# Patient Record
Sex: MALE | Race: WHITE | Employment: PART TIME | URBAN - METROPOLITAN AREA
[De-identification: names, ages, dates, MRNs, and addresses within clinical notes are randomized per-mention and may not be internally consistent; named-entity substitution may affect disease eponyms.]

---

## 2017-03-24 DIAGNOSIS — M10.9 GOUT, UNSPECIFIED CAUSE, UNSPECIFIED CHRONICITY, UNSPECIFIED SITE: ICD-10-CM

## 2017-03-24 DIAGNOSIS — E78.5 HYPERLIPIDEMIA LDL GOAL <100: ICD-10-CM

## 2017-03-24 DIAGNOSIS — I10 HYPERTENSION, GOAL BELOW 140/90: ICD-10-CM

## 2017-03-24 DIAGNOSIS — I10 ESSENTIAL HYPERTENSION, BENIGN: ICD-10-CM

## 2017-03-24 NOTE — TELEPHONE ENCOUNTER
Amlodipine and losartan      Last Written Prescription Date: 12/28/2016  Last Fill Quantity: 90, # refills: 0  Last Office Visit with [facility] or [facility] prescribing provider: 12/15/2015       Potassium   Date Value Ref Range Status   12/15/2015 3.9 3.4 - 5.3 mmol/L Final     Creatinine   Date Value Ref Range Status   12/15/2015 1.36 (H) 0.66 - 1.25 mg/dL Final     BP Readings from Last 3 Encounters:   12/15/15 136/47   11/13/14 137/55   06/11/14 140/62       Simvastatin     Last Written Prescription Date: 12/28/2016  Last Fill Quantity: 90, # refills: 0  Last Office Visit with [facility] or [facility] prescribing provider: 12/15/2015       Lab Results   Component Value Date    CHOL 140 11/13/2014     Lab Results   Component Value Date     11/13/2014     Lab Results   Component Value Date    LDL 26 11/13/2014     Lab Results   Component Value Date    TRIG 61 11/13/2014     Lab Results   Component Value Date    CHOLHDLRATIO 1.4 11/13/2014       Allopurinal       Last Written Prescription Date: 12/28/2016  Last Fill Quantity: 90, # refills: 0  Last Office Visit with [facility] or [facility] prescribing provider:  12/15/2015        Uric Acid   Date Value Ref Range Status   07/29/2004 5.7 3.5 - 8.5 mg/dL Final   ]  Creatinine   Date Value Ref Range Status   12/15/2015 1.36 (H) 0.66 - 1.25 mg/dL Final   ]  Lab Results   Component Value Date    WBC 7.0 07/27/2012     Lab Results   Component Value Date    RBC 3.98 07/27/2012     Lab Results   Component Value Date    HGB 13.4 07/27/2012     Lab Results   Component Value Date    HCT 38.3 07/27/2012     No components found for: MCT  Lab Results   Component Value Date    MCV 96 07/27/2012     Lab Results   Component Value Date    MCH 33.7 07/27/2012     Lab Results   Component Value Date    MCHC 35.0 07/27/2012     Lab Results   Component Value Date    RDW 11.5 07/27/2012     Lab Results   Component Value Date     07/27/2012     Lab Results   Component Value Date    AST  29 07/27/2012     Lab Results   Component Value Date    ALT 10 07/27/2012       Don FlorenceR)

## 2017-03-28 RX ORDER — LOSARTAN POTASSIUM 100 MG/1
100 TABLET ORAL DAILY
Qty: 90 TABLET | Refills: 0 | Status: SHIPPED | OUTPATIENT
Start: 2017-03-28 | End: 2017-03-29

## 2017-03-28 RX ORDER — AMLODIPINE BESYLATE 10 MG/1
10 TABLET ORAL DAILY
Qty: 90 TABLET | Refills: 0 | Status: SHIPPED | OUTPATIENT
Start: 2017-03-28 | End: 2017-03-29

## 2017-03-28 RX ORDER — SIMVASTATIN 20 MG
20 TABLET ORAL AT BEDTIME
Qty: 90 TABLET | Refills: 0 | Status: SHIPPED | OUTPATIENT
Start: 2017-03-28 | End: 2017-03-29

## 2017-03-28 RX ORDER — ALLOPURINOL 100 MG/1
100 TABLET ORAL DAILY
Qty: 90 TABLET | Refills: 0 | Status: SHIPPED | OUTPATIENT
Start: 2017-03-28 | End: 2017-03-29

## 2017-03-28 NOTE — TELEPHONE ENCOUNTER
Routing refill request to provider for review/approval because:  Labs out of range:  See below    Magy Gonzales RN

## 2017-03-29 ENCOUNTER — OFFICE VISIT (OUTPATIENT)
Dept: FAMILY MEDICINE | Facility: CLINIC | Age: 82
End: 2017-03-29
Payer: COMMERCIAL

## 2017-03-29 VITALS
SYSTOLIC BLOOD PRESSURE: 148 MMHG | HEIGHT: 70 IN | OXYGEN SATURATION: 97 % | BODY MASS INDEX: 23.71 KG/M2 | HEART RATE: 66 BPM | WEIGHT: 165.6 LBS | DIASTOLIC BLOOD PRESSURE: 55 MMHG

## 2017-03-29 DIAGNOSIS — Z23 ENCOUNTER FOR IMMUNIZATION: ICD-10-CM

## 2017-03-29 DIAGNOSIS — I10 HYPERTENSION GOAL BP (BLOOD PRESSURE) < 150/90: ICD-10-CM

## 2017-03-29 DIAGNOSIS — I10 HYPERTENSION, GOAL BELOW 140/90: ICD-10-CM

## 2017-03-29 DIAGNOSIS — E78.5 HYPERLIPIDEMIA LDL GOAL <100: Primary | ICD-10-CM

## 2017-03-29 DIAGNOSIS — I10 ESSENTIAL HYPERTENSION, BENIGN: ICD-10-CM

## 2017-03-29 DIAGNOSIS — M10.9 GOUT, UNSPECIFIED CAUSE, UNSPECIFIED CHRONICITY, UNSPECIFIED SITE: ICD-10-CM

## 2017-03-29 LAB
ANION GAP SERPL CALCULATED.3IONS-SCNC: 7 MMOL/L (ref 3–14)
BUN SERPL-MCNC: 23 MG/DL (ref 7–30)
CALCIUM SERPL-MCNC: 9.4 MG/DL (ref 8.5–10.1)
CHLORIDE SERPL-SCNC: 107 MMOL/L (ref 94–109)
CHOLEST SERPL-MCNC: 163 MG/DL
CO2 SERPL-SCNC: 26 MMOL/L (ref 20–32)
CREAT SERPL-MCNC: 1.36 MG/DL (ref 0.66–1.25)
GFR SERPL CREATININE-BSD FRML MDRD: 50 ML/MIN/1.7M2
GLUCOSE SERPL-MCNC: 102 MG/DL (ref 70–99)
HDLC SERPL-MCNC: 96 MG/DL
LDLC SERPL CALC-MCNC: 54 MG/DL
NONHDLC SERPL-MCNC: 67 MG/DL
POTASSIUM SERPL-SCNC: 4 MMOL/L (ref 3.4–5.3)
SODIUM SERPL-SCNC: 140 MMOL/L (ref 133–144)
TRIGL SERPL-MCNC: 66 MG/DL

## 2017-03-29 PROCEDURE — 80061 LIPID PANEL: CPT | Performed by: FAMILY MEDICINE

## 2017-03-29 PROCEDURE — 36415 COLL VENOUS BLD VENIPUNCTURE: CPT | Performed by: FAMILY MEDICINE

## 2017-03-29 PROCEDURE — 90670 PCV13 VACCINE IM: CPT | Performed by: FAMILY MEDICINE

## 2017-03-29 PROCEDURE — G0009 ADMIN PNEUMOCOCCAL VACCINE: HCPCS | Performed by: FAMILY MEDICINE

## 2017-03-29 PROCEDURE — 80048 BASIC METABOLIC PNL TOTAL CA: CPT | Performed by: FAMILY MEDICINE

## 2017-03-29 PROCEDURE — 99397 PER PM REEVAL EST PAT 65+ YR: CPT | Mod: 25 | Performed by: FAMILY MEDICINE

## 2017-03-29 RX ORDER — LOSARTAN POTASSIUM 100 MG/1
100 TABLET ORAL DAILY
Qty: 90 TABLET | Refills: 3 | Status: SHIPPED | OUTPATIENT
Start: 2017-03-29 | End: 2018-03-20

## 2017-03-29 RX ORDER — ANTIOX #8/OM3/DHA/EPA/LUT/ZEAX 250-2.5 MG
1 CAPSULE ORAL 2 TIMES DAILY
COMMUNITY
End: 2019-01-01

## 2017-03-29 RX ORDER — ALLOPURINOL 100 MG/1
100 TABLET ORAL DAILY
Qty: 90 TABLET | Refills: 3 | Status: SHIPPED | OUTPATIENT
Start: 2017-03-29 | End: 2018-03-20

## 2017-03-29 RX ORDER — SIMVASTATIN 20 MG
20 TABLET ORAL AT BEDTIME
Qty: 90 TABLET | Refills: 3 | Status: SHIPPED | OUTPATIENT
Start: 2017-03-29 | End: 2018-03-20

## 2017-03-29 RX ORDER — AMLODIPINE BESYLATE 10 MG/1
10 TABLET ORAL DAILY
Qty: 90 TABLET | Refills: 3 | Status: SHIPPED | OUTPATIENT
Start: 2017-03-29 | End: 2018-03-20

## 2017-03-29 NOTE — LETTER
[facility]  [address]  [facility] [address]  Phone: [phone]    March 30, 2017    Andres Sow  [address] [place] [address]          Dear Andres,    The results of your recent lab tests were within normal limits. Enclosed is a copy of these results.  If you have any further questions or problems, please contact our office.  Results for orders placed or performed in visit on 03/29/17   Basic metabolic panel   Result Value Ref Range    Sodium 140 133 - 144 mmol/L    Potassium 4.0 3.4 - 5.3 mmol/L    Chloride 107 94 - 109 mmol/L    Carbon Dioxide 26 20 - 32 mmol/L    Anion Gap 7 3 - 14 mmol/L    Glucose 102 (H) 70 - 99 mg/dL    Urea Nitrogen 23 7 - 30 mg/dL    Creatinine 1.36 (H) 0.66 - 1.25 mg/dL    GFR Estimate 50 (L) >60 mL/min/1.7m2    GFR Estimate If Black 61 >60 mL/min/1.7m2    Calcium 9.4 8.5 - 10.1 mg/dL   Lipid panel reflex to direct LDL   Result Value Ref Range    Cholesterol 163 <200 mg/dL    Triglycerides 66 <150 mg/dL    HDL Cholesterol 96 >39 mg/dL    LDL Cholesterol Calculated 54 <100 mg/dL    Non HDL Cholesterol 67 <130 mg/dL     Sincerely,      Emery Shanks MD/ llc

## 2017-03-29 NOTE — PATIENT INSTRUCTIONS
Thank you for choosing [facility].  You may be receiving a survey in the mail from Stephanie Graham regarding your visit today.  Please take a few minutes to complete and return the survey to let us know how we are doing.      Our Clinic hours are:  Mondays    7:20 am - 7 pm  Tues -  Fri  7:20 am - 5 pm    Clinic Phone: [phone]    The clinic lab opens at 7:30 am Mon - Fri and appointments are required.    [facility]. [phone]  Monday-Thursday 8 am - 7pm  Tues/Wed/Fri 8 am - 5:30 pm         Preventive Health Recommendations:   Male Ages 65 and over    Yearly exam:             See your health care provider every year in order to  o   Review health changes.   o   Discuss preventive care.    o   Review your medicines if your doctor has prescribed any.    Talk with your health care provider about whether you should have a test to screen for prostate cancer (PSA).    Every 3 years, have a diabetes test (fasting glucose). If you are at risk for diabetes, you should have this test more often.    Every 5 years, have a cholesterol test. Have this test more often if you are at risk for high cholesterol or heart disease.     Every 10 years, have a colonoscopy. Or, have a yearly FIT test (stool test). These exams will check for colon cancer.    Talk to with your health care provider about screening for Abdominal Aortic Aneurysm if you have a family history of AAA or have a history of smoking.    Shots:     Get a flu shot each year.     Get a tetanus shot every 10 years.     Talk to your doctor about your pneumonia vaccines. There are now two you should receive - Pneumovax (PPSV 23) and Prevnar (PCV 13).     Talk to your doctor about a shingles vaccine.     Talk to your doctor about the hepatitis B vaccine.  Nutrition:     Eat at least 5 servings of fruits and vegetables each day.     Eat whole-grain bread, whole-wheat pasta and brown rice instead of white grains and rice.     Talk to your  provider about Calcium and Vitamin D.   Lifestyle    Exercise for at least 150 minutes a week (30 minutes a day, 5 days a week). This will help you control your weight and prevent disease.     Limit alcohol to one drink per day.     No smoking.     Wear sunscreen to prevent skin cancer.     See your dentist every six months for an exam and cleaning.     See your eye doctor every 1 to 2 years to screen for conditions such as glaucoma, macular degeneration, cataracts, etc

## 2017-03-29 NOTE — MR AVS SNAPSHOT
After Visit Summary   3/29/2017    Andres Sow    MRN: [id number]           Patient Information     Date Of Birth          11/21/1933        Visit Information        Provider Department      3/29/2017 9:00 AM Emery Shanks MD [facility]        Today's Diagnoses     Hyperlipidemia LDL goal <100    -  1    Hypertension goal BP (blood pressure) < 150/90        Essential hypertension, benign        Hypertension, goal is average of 140/90 per cardiology. Labile hypertension.        Gout, unspecified cause, unspecified chronicity, unspecified site          Care Instructions          Thank you for choosing [facility].  You may be receiving a survey in the mail from [email or web address] regarding your visit today.  Please take a few minutes to complete and return the survey to let us know how we are doing.      Our Clinic hours are:  Mondays    7:20 am - 7 pm  Tues -  Fri  7:20 am - 5 pm    Clinic Phone: [phone]    The clinic lab opens at 7:30 am Mon - Fri and appointments are required.    [facility]  Ph. [phone]  Monday-Thursday 8 am - 7pm  Tues/Wed/Fri 8 am - 5:30 pm         Preventive Health Recommendations:   Male Ages 65 and over    Yearly exam:             See your health care provider every year in order to  o   Review health changes.   o   Discuss preventive care.    o   Review your medicines if your doctor has prescribed any.    Talk with your health care provider about whether you should have a test to screen for prostate cancer (PSA).    Every 3 years, have a diabetes test (fasting glucose). If you are at risk for diabetes, you should have this test more often.    Every 5 years, have a cholesterol test. Have this test more often if you are at risk for high cholesterol or heart disease.     Every 10 years, have a colonoscopy. Or, have a yearly FIT test (stool test). These exams will check for colon cancer.    Talk to with your health care  provider about screening for Abdominal Aortic Aneurysm if you have a family history of AAA or have a history of smoking.    Shots:     Get a flu shot each year.     Get a tetanus shot every 10 years.     Talk to your doctor about your pneumonia vaccines. There are now two you should receive - Pneumovax (PPSV 23) and Prevnar (PCV 13).     Talk to your doctor about a shingles vaccine.     Talk to your doctor about the hepatitis B vaccine.  Nutrition:     Eat at least 5 servings of fruits and vegetables each day.     Eat whole-grain bread, whole-wheat pasta and brown rice instead of white grains and rice.     Talk to your provider about Calcium and Vitamin D.   Lifestyle    Exercise for at least 150 minutes a week (30 minutes a day, 5 days a week). This will help you control your weight and prevent disease.     Limit alcohol to one drink per day.     No smoking.     Wear sunscreen to prevent skin cancer.     See your dentist every six months for an exam and cleaning.     See your eye doctor every 1 to 2 years to screen for conditions such as glaucoma, macular degeneration, cataracts, etc         Follow-ups after your visit        Who to contact     If you have questions or need follow up information about today's clinic visit or your schedule please contact [facility] directly at [phone].  Normal or non-critical lab and imaging results will be communicated to you by [employer], letter or phone within 4 business days after the clinic has received the results. If you do not hear from us within 7 days, please contact the clinic through [employer] or phone. If you have a critical or abnormal lab result, we will notify you by phone as soon as possible.  Submit refill requests through [facility] or call your pharmacy and they will forward the refill request to us. Please allow 3 business days for your refill to be completed.          Additional Information About Your Visit        MyChart Information      "[employer] lets you send messages to your doctor, view your test results, renew your prescriptions, schedule appointments and more. To sign up, go to [email or web address] . Click on \"Log in\" on the left side of the screen, which will take you to the Welcome page. Then click on \"Sign up Now\" on the right side of the page.     You will be asked to enter the access code listed below, as well as some personal information. Please follow the directions to create your username and password.     Your access code is: [email or web address]  Expires: 2017 12:07 PM     Your access code will  in 90 days. If you need help or a new code, please call your [facility] clinic or [phone].        Care EveryWhere ID     This is your Care EveryWhere ID. This could be used by other organizations to access your [facility] medical records  [id number]        Your Vitals Were     Pulse Height Pulse Oximetry BMI (Body Mass Index)          66 5' 9.5\" (1.765 m) 97% 24.1 kg/m2         Blood Pressure from Last 3 Encounters:   17 148/55   12/15/15 136/47   14 137/55    Weight from Last 3 Encounters:   17 165 lb 9.6 oz (75.1 kg)   12/15/15 160 lb (72.6 kg)   14 167 lb 9.6 oz (76 kg)              We Performed the Following     Basic metabolic panel     Lipid panel reflex to direct LDL          Today's Medication Changes          These changes are accurate as of: 3/29/17 12:07 PM.  If you have any questions, ask your nurse or doctor.               These medicines have changed or have updated prescriptions.        Dose/Directions    allopurinol 100 MG tablet   Commonly known as:  ZYLOPRIM   This may have changed:  additional instructions   Used for:  Gout, unspecified cause, unspecified chronicity, unspecified site   Changed by:  Emery Shanks MD        Dose:  100 mg   Take 1 tablet (100 mg) by mouth daily   Quantity:  90 tablet   Refills:  3       amLODIPine 10 MG tablet   Commonly known as:  NORVASC   This may " have changed:  additional instructions   Used for:  Hypertension, goal below 140/90   Changed by:  Emery Shanks MD        Dose:  10 mg   Take 1 tablet (10 mg) by mouth daily   Quantity:  90 tablet   Refills:  3       losartan 100 MG tablet   Commonly known as:  COZAAR   This may have changed:  additional instructions   Used for:  Essential hypertension, benign   Changed by:  Emery Shanks MD        Dose:  100 mg   Take 1 tablet (100 mg) by mouth daily   Quantity:  90 tablet   Refills:  3       simvastatin 20 MG tablet   Commonly known as:  ZOCOR   This may have changed:  additional instructions   Used for:  Hyperlipidemia LDL goal <100   Changed by:  Emery Shanks MD        Dose:  20 mg   Take 1 tablet (20 mg) by mouth At Bedtime   Quantity:  90 tablet   Refills:  3            Where to get your medicines      These medications were sent to [id number] [address] [address] [facility] [address], [facility] [address]     Phone:  [phone]     allopurinol 100 MG tablet    amLODIPine 10 MG tablet    losartan 100 MG tablet    simvastatin 20 MG tablet                Primary Care Provider Office Phone # Fax #    Emery Shanks [phone]       [facility] [address] [address] [address]        Thank you!     Thank you for choosing [facility]  for your care. Our goal is always to provide you with excellent care. Hearing back from our patients is one way we can continue to improve our services. Please take a few minutes to complete the written survey that you may receive in the mail after your visit with us. Thank you!             Your Updated Medication List - Protect others around you: Learn how to safely use, store and throw away your medicines at www.disposemymeds.org.          This list is accurate as of: 3/29/17 12:07 PM.  Always use your most recent med list.                    Brand Name Dispense Instructions for use    allopurinol 100 MG tablet    ZYLOPRIM    90 tablet    Take 1 tablet (100 mg) by mouth daily       amLODIPine 10 MG tablet    NORVASC    90 tablet    Take 1 tablet (10 mg) by mouth daily       ASPIRIN CAPS 81 MG OR      1 tablet daily       LORazepam 0.5 MG tablet    ATIVAN    20 tablet    Take 1 tablet (0.5 mg) by mouth 2 times daily as needed for anxiety       losartan 100 MG tablet    COZAAR    90 tablet    Take 1 tablet (100 mg) by mouth daily       PRESERVISION AREDS 2 Caps      Take by mouth 2 times daily       simvastatin 20 MG tablet    ZOCOR    90 tablet    Take 1 tablet (20 mg) by mouth At Bedtime       triamcinolone 0.1 % cream    KENALOG    80 g    Apply sparingly to affected area three times daily as needed       vitamin D 1000 UNITS capsule      2 CAPSULE BY MOUTH ONCE DAILY       Vitamin-B Complex Tabs      1 tablet by mouth once daily

## 2017-03-29 NOTE — NURSING NOTE
"Chief Complaint   Patient presents with     Physical       Initial /67 (Cuff Size: Adult Regular)  Pulse 66  Ht 5' 9.5\" (1.765 m)  Wt 165 lb 9.6 oz (75.1 kg)  SpO2 97%  BMI 24.1 kg/m2 Estimated body mass index is 24.1 kg/(m^2) as calculated from the following:    Height as of this encounter: 5' 9.5\" (1.765 m).    Weight as of this encounter: 165 lb 9.6 oz (75.1 kg).  Medication Reconciliation: complete   Lucita Card CMA      "

## 2017-03-29 NOTE — PROGRESS NOTES
SUBJECTIVE:                                                            Andres Sow is a 83 year old male who presents for Preventive Visit.    He is still very active. 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    Hypertension: At home his blood pressures average 148/55.      Are you in the first 12 months of your Medicare Part B coverage?  No    Healthy Habits:    Do you get at least three servings of calcium containing foods daily (dairy, green leafy vegetables, etc.)? no, taking calcium and/or vitamin D supplement: yes -     Amount of exercise or daily activities, outside of work: minimal hour(s) per day    Problems taking medications regularly No    Medication side effects: No    Have you had an eye exam in the past two years? yes    Do you see a dentist twice per year? no    Do you have sleep apnea, excessive snoring or daytime drowsiness?no    COGNITIVE SCREEN  1) Repeat 3 items (Banana, Sunrise, Chair)    2) Clock draw: NORMAL  3) 3 item recall: Recalls 3 objects  Results: NORMAL clock, 1-2 items recalled: COGNITIVE IMPAIRMENT LESS LIKELY    Mini-CogTM Copyright S Pastora. Licensed by the author for use in [facility]; reprinted with permission (nicola@[facility]). All rights reserved.                Reviewed and updated as needed this visit by clinical staff  Tobacco  Allergies  Meds  Med Hx  Surg Hx  Fam Hx  Soc Hx        Reviewed and updated as needed this visit by Provider        Social History   Substance Use Topics     Smoking status: Former Smoker     Years: 25.00     Types: Cigarettes, Pipe, Cigars     Quit date: 1/1/1978     Smokeless tobacco: Never Used     Alcohol use 36.0 oz/week      Comment: drinks 2 drinks daily            Standardized Alcohol Screening Questionnaire  AUDIT   Questions 0 1 2 3 4 Score   1. How often do you have a drink  containing alcohol? Never Monthly or less 2 to 4  times a  month 2 to 3  times a  week 4 or  more  times a  week  4   2. How many drinks containing alcohol  do you have on a typical day when you are drinking? 1 or 2 3 or 4 5 or 6 7 to 9 10 or more  1   3. How often do you have more than five  or more drinks on one occasion? Never Less  than  monthly Monthly Weekly Daily or  almost  daily  1   4. How often during the last year have  you found that you were not able to stop drinking once you had started? Never Less  than  monthly Monthly Weekly Daily or  almost  daily  0   5. How often during the last year have  you failed to do what was normally expected of you because of drinking? Never Less  than  monthly Monthly Weekly Daily or  almost  daily  0   6. How often during the last year have  you needed a first drink in the morning to get yourself going after a heavy drinking session? Never Less  than  monthly Monthly Weekly Daily or  almost  daily  0   7. How often during the last year have you had a feeling of guilt or remorse after drinking? Never Less  than  monthly Monthly Weekly Daily or  almost  daily  0   8. How often during the last year have  you been unable to remember what happened the night before because of your drinking? Never Less  than  monthly Monthly Weekly Daily or  almost  daily  0   9. Have you or someone else been  injured because of your drinking? No  Yes, but not in the last year  Yes,  during the  last year  0   10. Has a relative, friend, doctor or other health care worker been concerned about your drinking or suggested you cut down? No  Yes, but not in the last year  Yes,  during the  last year  0   Total  6   Scoring: A score of 7 for adult men is an indication of hazardous drinking (risk for physical or physiological harm); a score of 8 or more is an indication of an alcohol use disorder. A score of 5 or more for adult women  is an indication of hazardous drinking or an alcohol use disorder.       Today's PHQ-2 Score:   PHQ-2 ( 1999 Pfizer) 3/29/2017 12/15/2015   Q1: Little  "interest or pleasure in doing things 0 0   Q2: Feeling down, depressed or hopeless 0 0   PHQ-2 Score 0 0       Do you feel safe in your environment - Yes    Do you have a Health Care Directive?: No: Advance care planning reviewed with patient; information given to patient to review.    Current providers sharing in care for this patient include:   Patient Care Team:  Emery Shanks MD as PCP - General      Hearing impairment: No    Ability to successfully perform activities of daily living: Yes, no assistance needed     Fall risk:  Fallen 2 or more times in the past year?: No  Any fall with injury in the past year?: No    Home safety:  none identified      The following health maintenance items are reviewed in Epic and correct as of today:  Health Maintenance   Topic Date Due     ADVANCE DIRECTIVE PLANNING Q5 YRS (NO INBASKET)  11/21/1951     PNEUMOCOCCAL (2 of 2 - PCV13) 04/24/2008     COLONOSCOPY Q3 YR INBASKET MESSAGE  07/27/2015     FALL RISK ASSESSMENT  12/15/2016     INFLUENZA VACCINE (SYSTEM ASSIGNED)  09/01/2017     TETANUS IMMUNIZATION (SYSTEM ASSIGNED)  11/13/2024         Pneumonia Vaccine:Adults age 65+ who received Pneumovax (PPSV23) at 65 years or older: Should be given PCV13 > 1 year after their most recent PPSV23     ROS:  Constitutional, HEENT, cardiovascular, pulmonary, GI, , musculoskeletal, neuro, skin, endocrine and psych systems are negative, except as otherwise noted.      OBJECTIVE:                                                            /67 (Cuff Size: Adult Regular)  Pulse 66  Ht 5' 9.5\" (1.765 m)  Wt 165 lb 9.6 oz (75.1 kg)  SpO2 97%  BMI 24.1 kg/m2 Estimated body mass index is 24.1 kg/(m^2) as calculated from the following:    Height as of this encounter: 5' 9.5\" (1.765 m).    Weight as of this encounter: 165 lb 9.6 oz (75.1 kg).  EXAM:   GENERAL: healthy, alert and no distress  EYES: Eyes grossly normal to inspection, PERRL and conjunctivae and sclerae " "normal  HENT: ear canals and TM's normal, nose and mouth without ulcers or lesions  NECK: no adenopathy, no asymmetry, masses, or scars and thyroid normal to palpation  RESP: lungs clear to auscultation - no rales, rhonchi or wheezes  CV: regular rate and rhythm, normal S1 S2, no S3 or S4, no murmur, click or rub, no peripheral edema and peripheral pulses strong  ABDOMEN: soft, nontender, no hepatosplenomegaly, no masses and bowel sounds normal   (male): normal male genitalia without lesions or urethral discharge, no hernia  RECTAL: normal sphincter tone, no rectal masses, prostate normal size, smooth, nontender without nodules or masses  MS: no gross musculoskeletal defects noted, no edema  SKIN: no suspicious lesions or rashes  NEURO: Normal strength and tone, mentation intact and speech normal  PSYCH: mentation appears normal, affect normal/bright    ASSESSMENT / PLAN:                                                                ICD-10-CM    1. Hyperlipidemia LDL goal <100 E78.5 Lipid panel reflex to direct LDL     simvastatin (ZOCOR) 20 MG tablet   2. Hypertension goal BP (blood pressure) < 150/90 I10 Basic metabolic panel   3. Essential hypertension, benign I10 losartan (COZAAR) 100 MG tablet   4. Hypertension, goal is average of 140/90 per cardiology. Labile hypertension. I10 amLODIPine (NORVASC) 10 MG tablet   5. Gout, unspecified cause, unspecified chronicity, unspecified site M10.9 allopurinol (ZYLOPRIM) 100 MG tablet       End of Life Planning:  Patient currently has an advanced directive: Yes.  Practitioner is supportive of decision.    COUNSELING:  Reviewed preventive health counseling, as reflected in patient instructions       Regular exercise       Healthy diet/nutrition        Estimated body mass index is 24.1 kg/(m^2) as calculated from the following:    Height as of this encounter: 5' 9.5\" (1.765 m).    Weight as of this encounter: 165 lb 9.6 oz (75.1 kg).     reports that he quit smoking about " 39 years ago. His smoking use included Cigarettes, Pipe, and Cigars. He quit after 25.00 years of use. He has never used smokeless tobacco.      Appropriate preventive services were discussed with this patient, including applicable screening as appropriate for cardiovascular disease, diabetes, osteopenia/osteoporosis, and glaucoma.  As appropriate for age/gender, discussed screening for colorectal cancer, prostate cancer, breast cancer, and cervical cancer. Checklist reviewing preventive services available has been given to the patient.    Reviewed patients plan of care and provided an AVS. The Basic Care Plan (routine screening as documented in Health Maintenance) for Andres meets the Care Plan requirement. This Care Plan has been established and reviewed with the Patient.    Counseling Resources:  ATP IV Guidelines  Pooled Cohorts Equation Calculator  Breast Cancer Risk Calculator  FRAX Risk Assessment  ICSI Preventive Guidelines  Dietary Guidelines for Americans, 2010  USDA's MyPlate  ASA Prophylaxis  Lung CA Screening    Emery Shanks MD  [facility]

## 2018-03-20 DIAGNOSIS — I10 HYPERTENSION, GOAL BELOW 140/90: ICD-10-CM

## 2018-03-20 DIAGNOSIS — M10.9 GOUT, UNSPECIFIED CAUSE, UNSPECIFIED CHRONICITY, UNSPECIFIED SITE: ICD-10-CM

## 2018-03-20 DIAGNOSIS — I10 ESSENTIAL HYPERTENSION, BENIGN: ICD-10-CM

## 2018-03-20 DIAGNOSIS — E78.5 HYPERLIPIDEMIA LDL GOAL <100: ICD-10-CM

## 2018-03-20 NOTE — TELEPHONE ENCOUNTER
"Requested Prescriptions   Pending Prescriptions Disp Refills     amLODIPine (NORVASC) 10 MG tablet [Pharmacy Med Name: AMLODIPINE 10MG TAB] 90 tablet      Sig: TAKE 1 TABLET BY MOUTH DAILY    Calcium Channel Blockers Protocol  Failed    3/20/2018  1:01 AM       Failed - Blood pressure under 140/90 in past 12 months    BP Readings from Last 3 Encounters:   03/29/17 148/55   12/15/15 136/47   11/13/14 137/55                Failed - Normal serum creatinine on file in past 12 months    Recent Labs   Lab Test  03/29/17   0951   CR  1.36*            Passed - Recent (12 mo) or future (30 days) visit within the authorizing provider's specialty    Patient had office visit in the last 12 months or has a visit in the next 30 days with authorizing provider or within the authorizing provider's specialty.  See \"Patient Info\" tab in inbasket, or \"Choose Columns\" in Meds & Orders section of the refill encounter.           Passed - Patient is age 18 or older        allopurinol (ZYLOPRIM) 100 MG tablet [Pharmacy Med Name: ALLOPURINOL 100MG TAB] 90 tablet      Sig: TAKE 1 TABLET BY MOUTH DAILY    Gout Agents Protocol Failed    3/20/2018  1:01 AM       Failed - CBC on file in past 12 months    Recent Labs   Lab Test  07/27/12   1445   WBC  7.0   RBC  3.98*   HGB  13.4   HCT  38.3*   PLT  154            Failed - ALT on file in past 12 months    Recent Labs   Lab Test  07/27/12   1445   ALT  10            Failed - Uric acid greater than or equal to 6 on file in past 12 months    No lab results found.  If level is 6mg/dL or greater, ok to refill one time and refer to provider.          Failed - Normal serum creatinine on file in the past 12 months    Recent Labs   Lab Test  03/29/17   0951   CR  1.36*            Passed - Recent (12 mo) or future (30 days) visit within the authorizing provider's specialty    Patient had office visit in the last 12 months or has a visit in the next 30 days with authorizing provider or within the authorizing " "provider's specialty.  See \"Patient Info\" tab in inbasket, or \"Choose Columns\" in Meds & Orders section of the refill encounter.           Passed - Patient is age 18 or older        losartan (COZAAR) 100 MG tablet [Pharmacy Med Name: LOSARTAN 100MG TABLET] 90 tablet      Sig: TAKE 1 TABLET BY MOUTH DAILY    Angiotensin-II Receptors Failed    3/20/2018  1:01 AM       Failed - Blood pressure under 140/90 in past 12 months    BP Readings from Last 3 Encounters:   03/29/17 148/55   12/15/15 136/47   11/13/14 137/55                Failed - Normal serum creatinine on file in past 12 months    Recent Labs   Lab Test  03/29/17   0951   CR  1.36*            Passed - Recent (12 mo) or future (30 days) visit within the authorizing provider's specialty    Patient had office visit in the last 12 months or has a visit in the next 30 days with authorizing provider or within the authorizing provider's specialty.  See \"Patient Info\" tab in inAethonsket, or \"Choose Columns\" in Meds & Orders section of the refill encounter.           Passed - Patient is age 18 or older       Passed - Normal serum potassium on file in past 12 months    Recent Labs   Lab Test  03/29/17   0951   POTASSIUM  4.0                    simvastatin (ZOCOR) 20 MG tablet [Pharmacy Med Name: SIMVASTATIN 20MG TAB] 90 tablet      Sig: TAKE 1 TABLET BY MOUTH DAILY AT BEDTIME    Statins Protocol Passed    3/20/2018  1:01 AM       Passed - LDL on file in past 12 months    Recent Labs   Lab Test  03/29/17   0951   LDL  54            Passed - No abnormal creatine kinase in past 12 months    No lab results found.            Passed - Recent (12 mo) or future (30 days) visit within the authorizing provider's specialty    Patient had office visit in the last 12 months or has a visit in the next 30 days with authorizing provider or within the authorizing provider's specialty.  See \"Patient Info\" tab in inbasket, or \"Choose Columns\" in Meds & Orders section of the refill encounter. "           Passed - Patient is age 18 or older        ALL 4 of these medications   Last Written Prescription Date:  03/29/2017  Last Fill Quantity: 90,  # refills: 3   Last office visit: 3/29/2017 with prescribing provider:  Dimitris   Future Office Visit:   Next 5 appointments (look out 90 days)     Mar 28, 2018 10:00 AM CDT   PHYSICAL with Emery Shanks MD   [facility] (Formerly named [facility])    [address] [address]   [phone]

## 2018-03-21 ENCOUNTER — TELEPHONE (OUTPATIENT)
Dept: FAMILY MEDICINE | Facility: CLINIC | Age: 83
End: 2018-03-21

## 2018-03-21 DIAGNOSIS — E78.5 HYPERLIPIDEMIA LDL GOAL <100: ICD-10-CM

## 2018-03-21 DIAGNOSIS — I10 ESSENTIAL HYPERTENSION, BENIGN: ICD-10-CM

## 2018-03-21 DIAGNOSIS — I10 HYPERTENSION, GOAL BELOW 140/90: ICD-10-CM

## 2018-03-21 DIAGNOSIS — M10.9 GOUT, UNSPECIFIED CAUSE, UNSPECIFIED CHRONICITY, UNSPECIFIED SITE: ICD-10-CM

## 2018-03-21 RX ORDER — LOSARTAN POTASSIUM 100 MG/1
TABLET ORAL
Qty: 90 TABLET | Refills: 0 | Status: SHIPPED | OUTPATIENT
Start: 2018-03-21 | End: 2018-03-21

## 2018-03-21 RX ORDER — SIMVASTATIN 20 MG
TABLET ORAL
Qty: 90 TABLET | Refills: 0 | Status: SHIPPED | OUTPATIENT
Start: 2018-03-21 | End: 2018-03-21

## 2018-03-21 RX ORDER — AMLODIPINE BESYLATE 10 MG/1
10 TABLET ORAL DAILY
Qty: 90 TABLET | Refills: 0 | Status: SHIPPED | OUTPATIENT
Start: 2018-03-21 | End: 2018-04-13

## 2018-03-21 RX ORDER — LOSARTAN POTASSIUM 100 MG/1
100 TABLET ORAL DAILY
Qty: 90 TABLET | Refills: 0 | Status: SHIPPED | OUTPATIENT
Start: 2018-03-21 | End: 2018-04-13

## 2018-03-21 RX ORDER — SIMVASTATIN 20 MG
20 TABLET ORAL AT BEDTIME
Qty: 90 TABLET | Refills: 0 | Status: SHIPPED | OUTPATIENT
Start: 2018-03-21 | End: 2018-04-13

## 2018-03-21 RX ORDER — AMLODIPINE BESYLATE 10 MG/1
TABLET ORAL
Qty: 90 TABLET | Refills: 0 | Status: SHIPPED | OUTPATIENT
Start: 2018-03-21 | End: 2018-03-21

## 2018-03-21 RX ORDER — ALLOPURINOL 100 MG/1
TABLET ORAL
Qty: 90 TABLET | Refills: 0 | Status: SHIPPED | OUTPATIENT
Start: 2018-03-21 | End: 2018-03-21

## 2018-03-21 RX ORDER — ALLOPURINOL 100 MG/1
100 TABLET ORAL DAILY
Qty: 90 TABLET | Refills: 0 | Status: SHIPPED | OUTPATIENT
Start: 2018-03-21 | End: 2018-04-13

## 2018-03-21 NOTE — TELEPHONE ENCOUNTER
Reason for Call:  Medication or medication refill:    Do you use a [facility]?  Name of the pharmacy and phone number for the current request:  [facility] [phone]    Name of the medication requested: Pharmacy called asking why Rxs that Dr. ERICA Shanks sent have a quantity listed, but no sigs.  I did tell pharmacy that pt is overdue for a yearly clinic appt and will need to be seen for Rx refills.  If Rxs were supposed to be sent, please list sig, quantity and resend Rx to pharmacy. Otherwise close encounter.       Other request:     Can we leave a detailed message on this number? YES    Phone number patient can be reached at: Home number on file [phone] (home)    Best Time: any    Call taken on 3/21/2018 at 1:35 PM by Poonam Patrick

## 2018-03-28 ENCOUNTER — TELEPHONE (OUTPATIENT)
Dept: FAMILY MEDICINE | Facility: CLINIC | Age: 83
End: 2018-03-28

## 2018-04-13 ENCOUNTER — OFFICE VISIT (OUTPATIENT)
Dept: FAMILY MEDICINE | Facility: CLINIC | Age: 83
End: 2018-04-13
Payer: COMMERCIAL

## 2018-04-13 VITALS
OXYGEN SATURATION: 96 % | WEIGHT: 159 LBS | HEART RATE: 67 BPM | BODY MASS INDEX: 23.55 KG/M2 | SYSTOLIC BLOOD PRESSURE: 151 MMHG | DIASTOLIC BLOOD PRESSURE: 67 MMHG | HEIGHT: 69 IN | RESPIRATION RATE: 12 BRPM | TEMPERATURE: 97.7 F

## 2018-04-13 DIAGNOSIS — I10 ESSENTIAL HYPERTENSION, BENIGN: ICD-10-CM

## 2018-04-13 DIAGNOSIS — I10 HYPERTENSION, GOAL BELOW 140/90: ICD-10-CM

## 2018-04-13 DIAGNOSIS — F41.9 ANXIETY: ICD-10-CM

## 2018-04-13 DIAGNOSIS — Z00.00 ROUTINE GENERAL MEDICAL EXAMINATION AT A HEALTH CARE FACILITY: Primary | ICD-10-CM

## 2018-04-13 DIAGNOSIS — Z12.11 SPECIAL SCREENING FOR MALIGNANT NEOPLASMS, COLON: ICD-10-CM

## 2018-04-13 DIAGNOSIS — E78.5 HYPERLIPIDEMIA LDL GOAL <100: ICD-10-CM

## 2018-04-13 DIAGNOSIS — M10.9 GOUT, UNSPECIFIED CAUSE, UNSPECIFIED CHRONICITY, UNSPECIFIED SITE: ICD-10-CM

## 2018-04-13 LAB
ANION GAP SERPL CALCULATED.3IONS-SCNC: 10 MMOL/L (ref 3–14)
BUN SERPL-MCNC: 27 MG/DL (ref 7–30)
CALCIUM SERPL-MCNC: 9 MG/DL (ref 8.5–10.1)
CHLORIDE SERPL-SCNC: 107 MMOL/L (ref 94–109)
CHOLEST SERPL-MCNC: 117 MG/DL
CO2 SERPL-SCNC: 22 MMOL/L (ref 20–32)
CREAT SERPL-MCNC: 1.26 MG/DL (ref 0.66–1.25)
GFR SERPL CREATININE-BSD FRML MDRD: 54 ML/MIN/1.7M2
GLUCOSE SERPL-MCNC: 94 MG/DL (ref 70–99)
HDLC SERPL-MCNC: 91 MG/DL
LDLC SERPL CALC-MCNC: 16 MG/DL
NONHDLC SERPL-MCNC: 26 MG/DL
POTASSIUM SERPL-SCNC: 3.9 MMOL/L (ref 3.4–5.3)
SODIUM SERPL-SCNC: 139 MMOL/L (ref 133–144)
TRIGL SERPL-MCNC: 48 MG/DL

## 2018-04-13 PROCEDURE — 80048 BASIC METABOLIC PNL TOTAL CA: CPT | Performed by: FAMILY MEDICINE

## 2018-04-13 PROCEDURE — 36415 COLL VENOUS BLD VENIPUNCTURE: CPT | Performed by: FAMILY MEDICINE

## 2018-04-13 PROCEDURE — 99397 PER PM REEVAL EST PAT 65+ YR: CPT | Performed by: FAMILY MEDICINE

## 2018-04-13 PROCEDURE — 80061 LIPID PANEL: CPT | Performed by: FAMILY MEDICINE

## 2018-04-13 RX ORDER — METOPROLOL SUCCINATE 25 MG/1
25 TABLET, EXTENDED RELEASE ORAL DAILY
Qty: 90 TABLET | Refills: 3 | Status: SHIPPED | OUTPATIENT
Start: 2018-04-13 | End: 2018-04-27

## 2018-04-13 RX ORDER — ALLOPURINOL 100 MG/1
100 TABLET ORAL DAILY
Qty: 90 TABLET | Refills: 3 | Status: SHIPPED | OUTPATIENT
Start: 2018-04-13 | End: 2019-01-01

## 2018-04-13 RX ORDER — LORAZEPAM 0.5 MG/1
0.5 TABLET ORAL 2 TIMES DAILY PRN
Qty: 20 TABLET | Refills: 0 | Status: ON HOLD | OUTPATIENT
Start: 2018-04-13 | End: 2019-01-01

## 2018-04-13 RX ORDER — LOSARTAN POTASSIUM 100 MG/1
100 TABLET ORAL DAILY
Qty: 90 TABLET | Refills: 3 | Status: SHIPPED | OUTPATIENT
Start: 2018-04-13 | End: 2019-01-01

## 2018-04-13 RX ORDER — AMLODIPINE BESYLATE 10 MG/1
10 TABLET ORAL DAILY
Qty: 90 TABLET | Refills: 3 | Status: SHIPPED | OUTPATIENT
Start: 2018-04-13 | End: 2019-01-01

## 2018-04-13 RX ORDER — SIMVASTATIN 20 MG
20 TABLET ORAL AT BEDTIME
Qty: 90 TABLET | Refills: 3 | Status: SHIPPED | OUTPATIENT
Start: 2018-04-13 | End: 2019-01-01

## 2018-04-13 ASSESSMENT — PAIN SCALES - GENERAL: PAINLEVEL: NO PAIN (0)

## 2018-04-13 NOTE — PROGRESS NOTES
SUBJECTIVE:   Andres Sow is a 84 year old male who presents for Preventive Visit.    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    Are you in the first 12 months of your Medicare Part B coverage?  No    Healthy Habits:    Do you get at least three servings of calcium containing foods daily (dairy, green leafy vegetables, etc.)? yes    Amount of exercise or daily activities, outside of work: 2 day(s) per week    Problems taking medications regularly No    Medication side effects: No    Have you had an eye exam in the past two years? yes    Do you see a dentist twice per year? no    Do you have sleep apnea, excessive snoring or daytime drowsiness?no      Ability to successfully perform activities of daily living: Yes, no assistance needed    Home safety:  none identified     Hearing impairment: Yes, Difficulty following a conversation in a noisy restaurant or crowded room.    Difficulty understanding soft or whispered speech.    Fall risk:  Fallen 2 or more times in the past year?: No  Any fall with injury in the past year?: No        COGNITIVE SCREEN  1) Repeat 3 items (Banana, Sunrise, Chair)    2) Clock draw: NORMAL  3) 3 item recall: Recalls 1 object   Results: NORMAL clock, 1-2 items recalled: COGNITIVE IMPAIRMENT LESS LIKELY    Mini-CogTM Copyright S Pastora. Licensed by the author for use in [facility]; reprinted with permission ([email or web address]@.[email or web address]). All rights reserved.                Reviewed and updated as needed this visit by clinical staff  Tobacco  Allergies  Med Hx  Surg Hx  Fam Hx  Soc Hx        Reviewed and updated as needed this visit by Provider        Social History   Substance Use Topics     Smoking status: Former Smoker     Years: 25.00     Types: Cigarettes, Pipe, Cigars     Quit date: 1/1/1978     Smokeless tobacco: Never Used     Alcohol use 36.0 oz/week      Comment: drinks 2 drinks daily       If you drink alcohol do you  "typically have >3 drinks per day or >7 drinks per week? No                        Today's PHQ-2 Score:   PHQ-2 ( 1999 Pfizer) 3/29/2017 12/15/2015   Q1: Little interest or pleasure in doing things 0 0   Q2: Feeling down, depressed or hopeless 0 0   PHQ-2 Score 0 0       Do you feel safe in your environment - Yes    Do you have a Health Care Directive?: No: Advance care planning reviewed with patient; information given to patient to review.    Current providers sharing in care for this patient include:   Patient Care Team:  Emery Shanks MD as PCP - General    The following health maintenance items are reviewed in Epic and correct as of today:  Health Maintenance   Topic Date Due     ADVANCE DIRECTIVE PLANNING Q5 YRS  11/[birth year]/1988     COLONOSCOPY Q3 YR  07/27/2015     FALL RISK ASSESSMENT  03/29/2018     INFLUENZA VACCINE (SYSTEM ASSIGNED)  09/01/2018     TETANUS IMMUNIZATION (SYSTEM ASSIGNED)  11/13/2024     PNEUMOCOCCAL  Completed     Labs reviewed in EPIC        ROS:  Constitutional, HEENT, cardiovascular, pulmonary, GI, , musculoskeletal, neuro, skin, endocrine and psych systems are negative, except as otherwise noted.    OBJECTIVE:   /67 (BP Location: Right arm, Patient Position: Chair, Cuff Size: Adult Regular)  Pulse 67  Temp 97.7  F (36.5  C) (Tympanic)  Resp 12  Ht 5' 8.5\" (1.74 m)  Wt 159 lb (72.1 kg)  SpO2 96%  BMI 23.82 kg/m2 Estimated body mass index is 23.82 kg/(m^2) as calculated from the following:    Height as of this encounter: 5' 8.5\" (1.74 m).    Weight as of this encounter: 159 lb (72.1 kg).  EXAM:   GENERAL: healthy, alert and no distress  EYES: Eyes grossly normal to inspection, PERRL and conjunctivae and sclerae normal  HENT: ear canals and TM's normal, nose and mouth without ulcers or lesions  NECK: no adenopathy, no asymmetry, masses, or scars and thyroid normal to palpation  RESP: lungs clear to auscultation - no rales, rhonchi or wheezes  CV: regular rate and rhythm, " "normal S1 S2, no S3 or S4, no murmur, click or rub, no peripheral edema and peripheral pulses strong  ABDOMEN: soft, nontender, no hepatosplenomegaly, no masses and bowel sounds normal   (male): normal male genitalia without lesions or urethral discharge, no hernia  RECTAL: normal sphincter tone, no rectal masses, prostate normal size, smooth, nontender without nodules or masses  MS: no gross musculoskeletal defects noted, no edema  SKIN: no suspicious lesions or rashes  NEURO: Normal strength and tone, mentation intact and speech normal  PSYCH: mentation appears normal, affect normal/bright    ASSESSMENT / PLAN:       ICD-10-CM    1. Routine general medical examination at a health care facility Z00.00    2. Hypertension, goal is average of 140/90 per cardiology. Labile hypertension. I10 amLODIPine (NORVASC) 10 MG tablet     Basic metabolic panel  (Ca, Cl, CO2, Creat, Gluc, K, Na, BUN)     metoprolol succinate (TOPROL-XL) 25 MG 24 hr tablet   3. Gout, unspecified cause, unspecified chronicity, unspecified site M10.9 allopurinol (ZYLOPRIM) 100 MG tablet   4. Essential hypertension, benign I10 losartan (COZAAR) 100 MG tablet   5. Hyperlipidemia LDL goal <100 E78.5 simvastatin (ZOCOR) 20 MG tablet     Lipid panel reflex to direct LDL Fasting   6. Anxiety F41.9 LORazepam (ATIVAN) 0.5 MG tablet   7. Special screening for malignant neoplasms, colon Z12.11 GASTROENTEROLOGY ADULT REF PROCEDURE ONLY       End of Life Planning:  Patient currently has an advanced directive: Yes.  Practitioner is supportive of decision.    COUNSELING:  Reviewed preventive health counseling, as reflected in patient instructions       Regular exercise       Healthy diet/nutrition        Estimated body mass index is 23.82 kg/(m^2) as calculated from the following:    Height as of this encounter: 5' 8.5\" (1.74 m).    Weight as of this encounter: 159 lb (72.1 kg).       reports that he quit smoking about 40 years ago. His smoking use included " Cigarettes, Pipe, and Cigars. He quit after 25.00 years of use. He has never used smokeless tobacco.      Appropriate preventive services were discussed with this patient, including applicable screening as appropriate for cardiovascular disease, diabetes, osteopenia/osteoporosis, and glaucoma.  As appropriate for age/gender, discussed screening for colorectal cancer, prostate cancer, breast cancer, and cervical cancer. Checklist reviewing preventive services available has been given to the patient.    Reviewed patients plan of care and provided an AVS. The Basic Care Plan (routine screening as documented in Health Maintenance) for Andres meets the Care Plan requirement. This Care Plan has been established and reviewed with the Patient.    Counseling Resources:  ATP IV Guidelines  Pooled Cohorts Equation Calculator  Breast Cancer Risk Calculator  FRAX Risk Assessment  ICSI Preventive Guidelines  Dietary Guidelines for Americans, 2010  USDA's MyPlate  ASA Prophylaxis  Lung CA Screening    Emery Shanks MD  [facility]

## 2018-04-13 NOTE — LETTER
[facility]  [address]  [facility] [address]  Phone: [phone]      4/16/2018     Andres Sow  [address] [facility] [facility] [address]      Dear Andres:    Thank you for allowing me to participate in your care. Your recent test results were reviewed and listed below.  your kidney functions have gradually improved over the last 10 years.  Perhaps because your blood pressures are better.  Remainder of labs are normal.    Your results are provided below for your review  Results for orders placed or performed in visit on 04/13/18   Lipid panel reflex to direct LDL Fasting   Result Value Ref Range    Cholesterol 117 <200 mg/dL    Triglycerides 48 <150 mg/dL    HDL Cholesterol 91 >39 mg/dL    LDL Cholesterol Calculated 16 <100 mg/dL    Non HDL Cholesterol 26 <130 mg/dL   Basic metabolic panel  (Ca, Cl, CO2, Creat, Gluc, K, Na, BUN)   Result Value Ref Range    Sodium 139 133 - 144 mmol/L    Potassium 3.9 3.4 - 5.3 mmol/L    Chloride 107 94 - 109 mmol/L    Carbon Dioxide 22 20 - 32 mmol/L    Anion Gap 10 3 - 14 mmol/L    Glucose 94 70 - 99 mg/dL    Urea Nitrogen 27 7 - 30 mg/dL    Creatinine 1.26 (H) 0.66 - 1.25 mg/dL    GFR Estimate 54 (L) >60 mL/min/1.7m2    GFR Estimate If Black 66 >60 mL/min/1.7m2    Calcium 9.0 8.5 - 10.1 mg/dL                 Thank you for choosing [facility]. As a result, please continue with the treatment plan discussed in the office. Return as discussed or sooner if symptoms worsen or fail to improve. If you have any further questions or concerns, please do not hesitate to contact us.      Sincerely,        Dr. Emery Shanks

## 2018-04-13 NOTE — MR AVS SNAPSHOT
After Visit Summary   4/13/2018    Andres Sow    MRN: [id number]           Patient Information     Date Of Birth          11/21/1933        Visit Information        Provider Department      4/13/2018 9:40 AM Emery Shanks MD [facility] [place]        Today's Diagnoses     Special screening for malignant neoplasms, colon    -  1    Hypertension, goal is average of 140/90 per cardiology. Labile hypertension.        Gout, unspecified cause, unspecified chronicity, unspecified site        Essential hypertension, benign        Hyperlipidemia LDL goal <100        Anxiety          Care Instructions      Preventive Health Recommendations:       Male Ages 65 and over    Yearly exam:             See your health care provider every year in order to  o   Review health changes.   o   Discuss preventive care.    o   Review your medicines if your doctor has prescribed any.    Talk with your health care provider about whether you should have a test to screen for prostate cancer (PSA).    Every 3 years, have a diabetes test (fasting glucose). If you are at risk for diabetes, you should have this test more often.    Every 5 years, have a cholesterol test. Have this test more often if you are at risk for high cholesterol or heart disease.     Every 10 years, have a colonoscopy. Or, have a yearly FIT test (stool test). These exams will check for colon cancer.    Talk to with your health care provider about screening for Abdominal Aortic Aneurysm if you have a family history of AAA or have a history of smoking.  Shots:     Get a flu shot each year.     Get a tetanus shot every 10 years.     Talk to your doctor about your pneumonia vaccines. There are now two you should receive - Pneumovax (PPSV 23) and Prevnar (PCV 13).    Talk to your doctor about a shingles vaccine.     Talk to your doctor about the hepatitis B vaccine.  Nutrition:     Eat at least 5 servings of fruits and vegetables each day.      Eat whole-grain bread, whole-wheat pasta and brown rice instead of white grains and rice.     Talk to your doctor about Calcium and Vitamin D.   Lifestyle    Exercise for at least 150 minutes a week (30 minutes a day, 5 days a week). This will help you control your weight and prevent disease.     Limit alcohol to one drink per day.     No smoking.     Wear sunscreen to prevent skin cancer.     See your dentist every six months for an exam and cleaning.     See your eye doctor every 1 to 2 years to screen for conditions such as glaucoma, macular degeneration and cataracts.          Follow-ups after your visit        Additional Services     GASTROENTEROLOGY ADULT REF PROCEDURE ONLY       Last Lab Result: Creatinine (mg/dL)       Date                     Value                 03/29/2017               1.36 (H)         ----------  Body mass index is 23.82 kg/(m^2).     Needed:  No  Language:  English    Patient will be contacted to schedule procedure.     Please be aware that coverage of these services is subject to the terms and limitations of your health insurance plan.  Call member services at your health plan with any benefit or coverage questions.  Any procedures must be performed at a [facility] facility OR coordinated by your clinic's referral office.    Please bring the following with you to your appointment:    (1) Any X-Rays, CTs or MRIs which have been performed.  Contact the facility where they were done to arrange for  prior to your scheduled appointment.    (2) List of current medications   (3) This referral request   (4) Any documents/labs given to you for this referral                  Future tests that were ordered for you today     Open Future Orders        Priority Expected Expires Ordered    GASTROENTEROLOGY ADULT REF PROCEDURE ONLY Routine  12/31/2018 4/13/2018            Who to contact     If you have questions or need follow up information about today's clinic visit or your  "schedule please contact [facility] directly at [phone].  Normal or non-critical lab and imaging results will be communicated to you by MyChart, letter or phone within 4 business days after the clinic has received the results. If you do not hear from us within 7 days, please contact the clinic through MyChart or phone. If you have a critical or abnormal lab result, we will notify you by phone as soon as possible.  Submit refill requests through VSporto or call your pharmacy and they will forward the refill request to us. Please allow 3 business days for your refill to be completed.          Additional Information About Your Visit        Ingen.ioharTipser Information     VSporto lets you send messages to your doctor, view your test results, renew your prescriptions, schedule appointments and more. To sign up, go to [email or web address] . Click on \"Log in\" on the left side of the screen, which will take you to the Welcome page. Then click on \"Sign up Now\" on the right side of the page.     You will be asked to enter the access code listed below, as well as some personal information. Please follow the directions to create your username and password.     Your access code is: [email or web address]  Expires: 2018 11:58 AM     Your access code will  in 90 days. If you need help or a new code, please call your [place] clinic or [phone].        Care EveryWhere ID     This is your Care EveryWhere ID. This could be used by other organizations to access your [place] medical records  [id number]        Your Vitals Were     Pulse Temperature Respirations Height Pulse Oximetry BMI (Body Mass Index)    67 97.7  F (36.5  C) (Tympanic) 12 5' 8.5\" (1.74 m) 96% 23.82 kg/m2       Blood Pressure from Last 3 Encounters:   18 151/67   17 148/55   12/15/15 136/47    Weight from Last 3 Encounters:   18 159 lb (72.1 kg)   17 165 lb 9.6 oz (75.1 kg)   12/15/15 160 lb (72.6 kg)              We " Performed the Following     Basic metabolic panel  (Ca, Cl, CO2, Creat, Gluc, K, Na, BUN)     Lipid panel reflex to direct LDL Fasting          Today's Medication Changes          These changes are accurate as of 4/13/18 11:58 AM.  If you have any questions, ask your nurse or doctor.               Start taking these medicines.        Dose/Directions    metoprolol succinate 25 MG 24 hr tablet   Commonly known as:  TOPROL-XL   Used for:  Hypertension, goal below 140/90   Started by:  Emery Shanks MD        Dose:  25 mg   Take 1 tablet (25 mg) by mouth daily   Quantity:  90 tablet   Refills:  3         These medicines have changed or have updated prescriptions.        Dose/Directions    allopurinol 100 MG tablet   Commonly known as:  ZYLOPRIM   This may have changed:  additional instructions   Used for:  Gout, unspecified cause, unspecified chronicity, unspecified site   Changed by:  Emery Shanks MD        Dose:  100 mg   Take 1 tablet (100 mg) by mouth daily   Quantity:  90 tablet   Refills:  3       amLODIPine 10 MG tablet   Commonly known as:  NORVASC   This may have changed:  additional instructions   Used for:  Hypertension, goal below 140/90   Changed by:  Emery Shanks MD        Dose:  10 mg   Take 1 tablet (10 mg) by mouth daily   Quantity:  90 tablet   Refills:  3       losartan 100 MG tablet   Commonly known as:  COZAAR   This may have changed:  additional instructions   Used for:  Essential hypertension, benign   Changed by:  Emery Shanks MD        Dose:  100 mg   Take 1 tablet (100 mg) by mouth daily   Quantity:  90 tablet   Refills:  3       simvastatin 20 MG tablet   Commonly known as:  ZOCOR   This may have changed:  additional instructions   Used for:  Hyperlipidemia LDL goal <100   Changed by:  Emery Shanks MD        Dose:  20 mg   Take 1 tablet (20 mg) by mouth At Bedtime   Quantity:  90 tablet   Refills:  3            Where to get your medicines       These medications were sent to [id number] - [place], [place] - [address]  [address], [facility] [address]     Phone:  [phone]     allopurinol 100 MG tablet    amLODIPine 10 MG tablet    losartan 100 MG tablet    metoprolol succinate 25 MG 24 hr tablet    simvastatin 20 MG tablet         Some of these will need a paper prescription and others can be bought over the counter.  Ask your nurse if you have questions.     Bring a paper prescription for each of these medications     LORazepam 0.5 MG tablet                Primary Care Provider Office Phone # Fax #    Emery Dakota Shanks [phone] [address]  [facility] [address]        Equal Access to Services     ONIEL NEVES : Hadii aad ku hadasho Sokerri, waaxda luqadaha, qaybta kaalmada adeegyada, rufino coon. So [facility] [phone].    ATENCIÓN: Si habla español, tiene a de la fuente disposición servicios gratuitos de asistencia lingüística. Llame al [phone].    We comply with applicable federal civil rights laws and [place] laws. We do not discriminate on the basis of race, color, national origin, age, disability, sex, sexual orientation, or gender identity.            Thank you!     Thank you for choosing [facility]  for your care. Our goal is always to provide you with excellent care. Hearing back from our patients is one way we can continue to improve our services. Please take a few minutes to complete the written survey that you may receive in the mail after your visit with us. Thank you!             Your Updated Medication List - Protect others around you: Learn how to safely use, store and throw away your medicines at www.disposemymeds.org.          This list is accurate as of 4/13/18 11:58 AM.  Always use your most recent med list.                   Brand Name Dispense Instructions for use Diagnosis    allopurinol 100 MG tablet     ZYLOPRIM    90 tablet    Take 1 tablet (100 mg) by mouth daily    Gout, unspecified cause, unspecified chronicity, unspecified site       amLODIPine 10 MG tablet    NORVASC    90 tablet    Take 1 tablet (10 mg) by mouth daily    Hypertension, goal below 140/90       ASPIRIN CAPS 81 MG OR      1 tablet daily    Routine general medical examination at a health care facility       LORazepam 0.5 MG tablet    ATIVAN    20 tablet    Take 1 tablet (0.5 mg) by mouth 2 times daily as needed for anxiety    Anxiety       losartan 100 MG tablet    COZAAR    90 tablet    Take 1 tablet (100 mg) by mouth daily    Essential hypertension, benign       metoprolol succinate 25 MG 24 hr tablet    TOPROL-XL    90 tablet    Take 1 tablet (25 mg) by mouth daily    Hypertension, goal below 140/90       PRESERVISION AREDS 2 Caps      Take by mouth 2 times daily        simvastatin 20 MG tablet    ZOCOR    90 tablet    Take 1 tablet (20 mg) by mouth At Bedtime    Hyperlipidemia LDL goal <100       triamcinolone 0.1 % cream    KENALOG    80 g    Apply sparingly to affected area three times daily as needed    Eczema       vitamin D 1000 units capsule      2 CAPSULE BY MOUTH ONCE DAILY        Vitamin-B Complex Tabs      1 tablet by mouth once daily

## 2018-04-27 ENCOUNTER — TELEPHONE (OUTPATIENT)
Dept: FAMILY MEDICINE | Facility: CLINIC | Age: 83
End: 2018-04-27

## 2018-04-27 DIAGNOSIS — I10 HYPERTENSION GOAL BP (BLOOD PRESSURE) < 150/90: Primary | ICD-10-CM

## 2018-04-27 RX ORDER — SPIRONOLACTONE 25 MG/1
25 TABLET ORAL DAILY
Qty: 90 TABLET | Refills: 3 | Status: SHIPPED | OUTPATIENT
Start: 2018-04-27 | End: 2018-05-16

## 2018-04-27 NOTE — TELEPHONE ENCOUNTER
Reason for Call:  Metoprolol    Detailed comments: patient is calling and stating that he saw Dr. ERICA Shanks a couple of weeks ago and he prescribed him Metoprolol. He thinks he is allergic to it. It is listed from his [facility] that he does have an allergy to Metoprolol, but he can't remember if its Metoprolol or Lisinopril. He did not  his medication, as he does not want to take a chance. Please advise.    Phone Number Patient can be reached at: Home number on file [phone] (home)    Best Time: any    Can we leave a detailed message on this number? YES   Hermelinda Martin  [facility] [place] [facility]      Call taken on 4/27/2018 at 10:18 AM by Hermelinda Martin

## 2018-04-27 NOTE — TELEPHONE ENCOUNTER
Please call patient, for his blood pressure instead of metoprolol, please order Spironolactone 25 mg daily, #90, 3 refills.  Please send us blood pressures in 1 week to see how it is working.

## 2018-04-27 NOTE — TELEPHONE ENCOUNTER
Dr. Shanks,    Patient calls as he is afraid to take the metoprolol medication you prescribed for him.  I have reviewed his allergies with him and let patient know that it was only when we increased the metoprolol that he got just dizziness from it.  Patient states he went on line and saw that if you have a low heart rate you should not take this medication.  Patient took his pulse today and it was high 40's and low 50's. Reviewed his VS here  Please advise. Sveta MUJICA RN

## 2018-04-27 NOTE — TELEPHONE ENCOUNTER
Medication is sent to [facility], the patient is called and he will send us bp's in a week. Sveta MUJICA RN

## 2018-05-04 ENCOUNTER — TELEPHONE (OUTPATIENT)
Dept: FAMILY MEDICINE | Facility: CLINIC | Age: 83
End: 2018-05-04

## 2018-05-04 NOTE — TELEPHONE ENCOUNTER
Reason for Call:  Other prescription    Detailed comments: Patient does not want to take spironolactone after reading possible side effects of: changing potassium levels, creates possible swelling. Patient wondering if something else can be ordered?    Phone Number Patient can be reached at: Home number on file [phone] (home)    Best Time: any    Can we leave a detailed message on this number? YES    Call taken on 5/4/2018 at 2:18 PM by Kathya Sharma

## 2018-05-04 NOTE — TELEPHONE ENCOUNTER
That is the second medication he has changed his mind about over the telephone.  Given his concerns and his complex medical situation I recommend he make an appointment for further evaluation and discussion of options.    Scar

## 2018-05-16 ENCOUNTER — OFFICE VISIT (OUTPATIENT)
Dept: FAMILY MEDICINE | Facility: CLINIC | Age: 83
End: 2018-05-16
Payer: COMMERCIAL

## 2018-05-16 VITALS
RESPIRATION RATE: 16 BRPM | DIASTOLIC BLOOD PRESSURE: 69 MMHG | BODY MASS INDEX: 23.96 KG/M2 | HEART RATE: 61 BPM | TEMPERATURE: 97.5 F | SYSTOLIC BLOOD PRESSURE: 136 MMHG | HEIGHT: 69 IN | WEIGHT: 161.8 LBS | OXYGEN SATURATION: 96 %

## 2018-05-16 DIAGNOSIS — I10 HYPERTENSION GOAL BP (BLOOD PRESSURE) < 150/90: Primary | ICD-10-CM

## 2018-05-16 DIAGNOSIS — D12.6 ADENOMATOUS POLYP OF COLON, UNSPECIFIED PART OF COLON: ICD-10-CM

## 2018-05-16 DIAGNOSIS — Z71.89 ADVANCED DIRECTIVES, COUNSELING/DISCUSSION: ICD-10-CM

## 2018-05-16 PROCEDURE — 99214 OFFICE O/P EST MOD 30 MIN: CPT | Performed by: FAMILY MEDICINE

## 2018-05-16 ASSESSMENT — PAIN SCALES - GENERAL: PAINLEVEL: NO PAIN (0)

## 2018-05-16 NOTE — PATIENT INSTRUCTIONS
Thank you for choosing [facility].  You may be receiving a survey in the mail from [facility] regarding your visit today.  Please take a few minutes to complete and return the survey to let us know how we are doing.      Our Clinic hours are:  Mondays    7:20 am - 7 pm  Tues -  Fri  7:20 am - 5 pm    Clinic Phone: [phone]    The clinic lab opens at 7:30 am Mon - Fri and appointments are required.    [facility]. [phone]  Monday-Thursday 8 am - 7pm  Tues/Wed/Fri 8 am - 5:30 pm

## 2018-05-16 NOTE — MR AVS SNAPSHOT
After Visit Summary   5/16/2018    Andres Sow    MRN: [id number]           Patient Information     Date Of Birth          11/21/1933        Visit Information        Provider Department      5/16/2018 7:40 AM Eveline Shanks MD [facility] [place][note 2]        Today's Diagnoses     Hypertension goal BP (blood pressure) < 150/90    -  1    Adenomatous polyp of colon, unspecified part of colon        Advanced directives, counseling/discussion          Care Instructions          Thank you for choosing [facility].  You may be receiving a survey in the mail from [facility] regarding your visit today.  Please take a few minutes to complete and return the survey to let us know how we are doing.      Our Clinic hours are:  Mondays    7:20 am - 7 pm  Tues -  Fri  7:20 am - 5 pm    Clinic Phone: [phone]    The clinic lab opens at 7:30 am Mon - Fri and appointments are required.    [facility]  Ph. [phone]  Monday-Thursday 8 am - 7pm  Tues/Wed/Fri 8 am - 5:30 pm                 Follow-ups after your visit        Your next 10 appointments already scheduled     May 29, 2018  4:00 PM CDT   Nurse Only with [place] CL RN   [facility] [place][note 2] ([facility] [place][note 2])    [address] [id number] [phone]              Who to contact     If you have questions or need follow up information about today's clinic visit or your schedule please contact [facility] directly at [phone].  Normal or non-critical lab and imaging results will be communicated to you by MyChart, letter or phone within 4 business days after the clinic has received the results. If you do not hear from us within 7 days, please contact the clinic through MyChart or phone. If you have a critical or abnormal lab result, we will notify you by phone as soon as possible.  Submit refill requests through Bahoui or call your pharmacy and they will  "forward the refill request to us. Please allow 3 business days for your refill to be completed.          Additional Information About Your Visit        GinxharWundrbar Information     Wikinvest lets you send messages to your doctor, view your test results, renew your prescriptions, schedule appointments and more. To sign up, go to [email or web address] . Click on \"Log in\" on the left side of the screen, which will take you to the Welcome page. Then click on \"Sign up Now\" on the right side of the page.     You will be asked to enter the access code listed below, as well as some personal information. Please follow the directions to create your username and password.     Your access code is: [email or web address]  Expires: 2018 11:58 AM     Your access code will  in 90 days. If you need help or a new code, please call your [place] clinic or [phone].        Care EveryWhere ID     This is your Care EveryWhere ID. This could be used by other organizations to access your [place] medical records  [id number]        Your Vitals Were     Pulse Temperature Respirations Height Pulse Oximetry BMI (Body Mass Index)    61 97.5  F (36.4  C) (Tympanic) 16 5' 8.5\" (1.74 m) 96% 24.24 kg/m2       Blood Pressure from Last 3 Encounters:   18 136/69   18 151/67   17 148/55    Weight from Last 3 Encounters:   18 161 lb 12.8 oz (73.4 kg)   18 159 lb (72.1 kg)   17 165 lb 9.6 oz (75.1 kg)              Today, you had the following     No orders found for display         Today's Medication Changes          These changes are accurate as of 18  8:49 AM.  If you have any questions, ask your nurse or doctor.               Stop taking these medicines if you haven't already. Please contact your care team if you have questions.     spironolactone 25 MG tablet   Commonly known as:  ALDACTONE   Stopped by:  Eveline Shanks MD                    Primary Care Provider Office Phone # Fax #    Eveline " Marion Shanks -[phone]       [address]  [facility] [address]        Equal Access to Services     GIANNICARISSA EDINSON : Gayle amber pereyra candida Pineda, wapatienceda alfatavonha, normata kaofeliada nerissa, rufino marroquinluther jaymie. So [facility] [phone].    ATENCIÓN: Si habla español, tiene a de la fuente disposición servicios gratuitos de asistencia lingüística. Llame al [phone].    We comply with applicable federal civil rights laws and [place] laws. We do not discriminate on the basis of race, color, national origin, age, disability, sex, sexual orientation, or gender identity.            Thank you!     Thank you for choosing [facility]  for your care. Our goal is always to provide you with excellent care. Hearing back from our patients is one way we can continue to improve our services. Please take a few minutes to complete the written survey that you may receive in the mail after your visit with us. Thank you!             Your Updated Medication List - Protect others around you: Learn how to safely use, store and throw away your medicines at www.disposemymeds.org.          This list is accurate as of 5/16/18  8:49 AM.  Always use your most recent med list.                   Brand Name Dispense Instructions for use Diagnosis    allopurinol 100 MG tablet    ZYLOPRIM    90 tablet    Take 1 tablet (100 mg) by mouth daily    Gout, unspecified cause, unspecified chronicity, unspecified site       amLODIPine 10 MG tablet    NORVASC    90 tablet    Take 1 tablet (10 mg) by mouth daily    Hypertension, goal below 140/90       ASPIRIN CAPS 81 MG OR      1 tablet daily    Routine general medical examination at a health care facility       LORazepam 0.5 MG tablet    ATIVAN    20 tablet    Take 1 tablet (0.5 mg) by mouth 2 times daily as needed for anxiety    Anxiety       losartan 100 MG tablet    COZAAR    90 tablet    Take 1 tablet (100 mg) by mouth daily    Essential  hypertension, benign       PRESERVISION AREDS 2 Caps      Take by mouth 2 times daily        simvastatin 20 MG tablet    ZOCOR    90 tablet    Take 1 tablet (20 mg) by mouth At Bedtime    Hyperlipidemia LDL goal <100       triamcinolone 0.1 % cream    KENALOG    80 g    Apply sparingly to affected area three times daily as needed    Eczema       vitamin D 1000 units capsule      2 CAPSULE BY MOUTH ONCE DAILY        Vitamin-B Complex Tabs      1 tablet by mouth once daily

## 2018-05-16 NOTE — PROGRESS NOTES
SUBJECTIVE:   Andres Sow is a 84 year old male who presents to clinic today for the following health issues:    Patient new to me. Was previously seen by Dr. Emery Shanks. Here to establish care now that he's retired.     Problem:   Chief Complaint   Patient presents with     Recheck Medication     med recheck and discuss Spironolactone, concerned with possible side effects     ADDITIONAL HPI: 84 year old male here for above issue.   He reports home blood pressures are 140/80's. Never did start spironolactone.     ROS: 10 point review of systems negative except as per HPI.    PAST MEDICAL HISTORY:  Past Medical History:   Diagnosis Date     Hypertension      Unspecified vertiginous syndromes and labyrinthine disorders     seen in ED after starting metoprolol; continued on it without adverse reaction        ACTIVE MEDICAL PROBLEMS:  Patient Active Problem List   Diagnosis     Abnormal blood chemistry     Hyperlipidemia LDL goal <100     Bradycardia     Vertiginous syndrome and labyrinthine disorder     Colon polyps     Anxiety     Hypertension goal BP (blood pressure) < 150/90     Advanced directives, counseling/discussion        FAMILY HISTORY:  Family History   Problem Relation Age of Onset     Hypertension Father      Breast Cancer Sister      CEREBROVASCULAR DISEASE Sister        SOCIAL HISTORY:  Social History     Social History     Marital status:      Spouse name: N/A     Number of children: N/A     Years of education: N/A     Occupational History     Not on file.     Social History Main Topics     Smoking status: Former Smoker     Years: 25.00     Types: Cigarettes, Pipe, Cigars     Quit date: 1/1/1978     Smokeless tobacco: Never Used     Alcohol use 36.0 oz/week      Comment: drinks 2 drinks daily     Drug use: No     Sexual activity: No     Other Topics Concern     Parent/Sibling W/ Cabg, Mi Or Angioplasty Before 65f 55m? No     Social History Narrative       MEDICATIONS:  Current  "Outpatient Prescriptions   Medication Sig Dispense Refill     allopurinol (ZYLOPRIM) 100 MG tablet Take 1 tablet (100 mg) by mouth daily 90 tablet 3     amLODIPine (NORVASC) 10 MG tablet Take 1 tablet (10 mg) by mouth daily 90 tablet 3     ASPIRIN CAPS 81 MG OR 1 tablet daily       LORazepam (ATIVAN) 0.5 MG tablet Take 1 tablet (0.5 mg) by mouth 2 times daily as needed for anxiety 20 tablet 0     losartan (COZAAR) 100 MG tablet Take 1 tablet (100 mg) by mouth daily 90 tablet 3     Multiple Vitamins-Minerals (PRESERVISION AREDS 2) CAPS Take by mouth 2 times daily       simvastatin (ZOCOR) 20 MG tablet Take 1 tablet (20 mg) by mouth At Bedtime 90 tablet 3     triamcinolone (KENALOG) 0.1 % cream Apply sparingly to affected area three times daily as needed 80 g 0     VITAMIN D 1000 UNIT OR CAPS 2 CAPSULE BY MOUTH ONCE DAILY       VITAMIN-B COMPLEX PO TABS 1 tablet by mouth once daily         ALLERGIES:     Allergies   Allergen Reactions     Hydrochlorothiazide      Low Potassium     Lisinopril      Neck swelling/airway       Problem list, Medication list, Allergies, and Medical/Social/Surgical histories reviewed in [facility] and updated as appropriate.    OBJECTIVE:                                                    VITALS: /69 (BP Location: Right arm, Cuff Size: Adult Regular)  Pulse 61  Temp 97.5  F (36.4  C) (Tympanic)  Resp 16  Ht 5' 8.5\" (1.74 m)  Wt 161 lb 12.8 oz (73.4 kg)  SpO2 96%  BMI 24.24 kg/m2 Body mass index is 24.24 kg/(m^2).  GENERAL: Pleasant, well appearing male.    ASSESSMENT/PLAN:                                                    1. Hypertension goal BP (blood pressure) < 150/90  Well controlled.  We will have him follow-up for nurse blood pressure check and home blood pressure cuff calibration.  He reports that home blood pressures are typically 140s/80s.  If home machine calibrates correctly then would leave medications as is.  For now we will not have him start up the spironolactone as he " never did started in the first place and today's blood pressure is well controlled.  Will attempt to get more blood pressure data before making further decisions.    2. Adenomatous polyp of colon, unspecified part of colon  Overdue for colonoscopy.  Stressed importance of getting this given history of tubular adenoma.  Scheduling information provided today.  He will call to schedule.    3. Advanced directives, counseling/discussion

## 2018-05-31 ENCOUNTER — ALLIED HEALTH/NURSE VISIT (OUTPATIENT)
Dept: FAMILY MEDICINE | Facility: CLINIC | Age: 83
End: 2018-05-31
Payer: COMMERCIAL

## 2018-05-31 ENCOUNTER — TELEPHONE (OUTPATIENT)
Dept: FAMILY MEDICINE | Facility: CLINIC | Age: 83
End: 2018-05-31

## 2018-05-31 VITALS — HEART RATE: 66 BPM | DIASTOLIC BLOOD PRESSURE: 69 MMHG | SYSTOLIC BLOOD PRESSURE: 163 MMHG

## 2018-05-31 DIAGNOSIS — I10 HYPERTENSION GOAL BP (BLOOD PRESSURE) < 150/90: Primary | ICD-10-CM

## 2018-05-31 PROCEDURE — 99207 ZZC NO CHARGE NURSE ONLY: CPT

## 2018-05-31 NOTE — MR AVS SNAPSHOT
After Visit Summary   5/31/2018    Andres Sow    MRN: [id number]           Patient Information     Date Of Birth          11/21/1933        Visit Information        Provider Department      5/31/2018 9:00 AM FL CL RN [facility]        Today's Diagnoses     Hypertension goal BP (blood pressure) < 150/90    -  1       Follow-ups after your visit        Who to contact     If you have questions or need follow up information about today's clinic visit or your schedule please contact [facility] directly at [phone].  Normal or non-critical lab and imaging results will be communicated to you by MyChart, letter or phone within 4 business days after the clinic has received the results. If you do not hear from us within 7 days, please contact the clinic through MyChart or phone. If you have a critical or abnormal lab result, we will notify you by phone as soon as possible.  Submit refill requests through [employer] or call your pharmacy and they will forward the refill request to us. Please allow 3 business days for your refill to be completed.          Additional Information About Your Visit        Care EveryWhere ID     This is your Care EveryWhere ID. This could be used by other organizations to access your [place] medical records  [id number]        Your Vitals Were     Pulse                   66            Blood Pressure from Last 3 Encounters:   05/31/18 163/69   05/16/18 136/69   04/13/18 151/67    Weight from Last 3 Encounters:   05/16/18 161 lb 12.8 oz (73.4 kg)   04/13/18 159 lb (72.1 kg)   03/29/17 165 lb 9.6 oz (75.1 kg)              Today, you had the following     No orders found for display       Primary Care Provider Office Phone # Fax #    Eveline Shanks [phone]       [address] [facility] [address]        Equal Access to Services     ONIEL NEVES AH: Gayle Pineda, alex scruggs, brendan  rufino canalesmarjan arroyo ah. [facility] [phone].    ATENCIÓN: Si vitaliy motta, tiene a de la fuente disposición servicios gratuitos de asistencia lingüística. Carlo al [phone].    We comply with applicable federal civil rights laws and [place] laws. We do not discriminate on the basis of race, color, national origin, age, disability, sex, sexual orientation, or gender identity.            Thank you!     Thank you for choosing [facility]  for your care. Our goal is always to provide you with excellent care. Hearing back from our patients is one way we can continue to improve our services. Please take a few minutes to complete the written survey that you may receive in the mail after your visit with us. Thank you!             Your Updated Medication List - Protect others around you: Learn how to safely use, store and throw away your medicines at www.disposemymeds.org.          This list is accurate as of 5/31/18  9:14 AM.  Always use your most recent med list.                   Brand Name Dispense Instructions for use Diagnosis    allopurinol 100 MG tablet    ZYLOPRIM    90 tablet    Take 1 tablet (100 mg) by mouth daily    Gout, unspecified cause, unspecified chronicity, unspecified site       amLODIPine 10 MG tablet    NORVASC    90 tablet    Take 1 tablet (10 mg) by mouth daily    Hypertension, goal below 140/90       ASPIRIN CAPS 81 MG OR      1 tablet daily    Routine general medical examination at a health care facility       LORazepam 0.5 MG tablet    ATIVAN    20 tablet    Take 1 tablet (0.5 mg) by mouth 2 times daily as needed for anxiety    Anxiety       losartan 100 MG tablet    COZAAR    90 tablet    Take 1 tablet (100 mg) by mouth daily    Essential hypertension, benign       PRESERVISION AREDS 2 Caps      Take by mouth 2 times daily        simvastatin 20 MG tablet    ZOCOR    90 tablet    Take 1 tablet (20 mg) by mouth At Bedtime    Hyperlipidemia LDL  goal <100       triamcinolone 0.1 % cream    KENALOG    80 g    Apply sparingly to affected area three times daily as needed    Eczema       vitamin D 1000 units capsule      2 CAPSULE BY MOUTH ONCE DAILY        Vitamin-B Complex Tabs      1 tablet by mouth once daily

## 2018-05-31 NOTE — TELEPHONE ENCOUNTER
Andres Sow is a 84 year old year old patient who comes in today for a Blood Pressure check because of ongoing blood pressure monitoring and comparing home BP monitor to clinic monitor.  Goal BP for this pt: < 150/90    Vitals:    05/31/18 0905 05/31/18 0907   BP: 162/68 163/69   BP Location: Left arm Left arm   Patient Position: Sitting Sitting   Cuff Size: Adult Regular Adult Regular   Pulse: 66      Patient is taking medication as prescribed  Patient is tolerating medications well.  Patient is monitoring Blood Pressure at home. He did not bring home readings in with him today.  Current complaints: none, denies  Disposition:  Routed to provider for review.     Allergies reviewed with pt today and he reports metoprolol gives him tongue swelling (this is not currently on allergy list).    Advise,     Sabra BUTTS RN

## 2018-06-04 NOTE — TELEPHONE ENCOUNTER
Dr. Mosquera,  Patient called and does have the Spironolactone still but is concerned with tongue swelling side effect, says has not actually experienced tongue swelling with the medication but wondering about it and says discussed with you about it 2 few weeks ago. Please advise.    KWABENA Vitale

## 2018-06-05 NOTE — TELEPHONE ENCOUNTER
Yes we did discuss it but discussed that unlikely this would occur. I would suggest he try it.  Could even do half a pill for the first few days if he is still very worried about this.   Risks of uncontrolled hypertension include stroke and heart attack.  So we weigh risks and benefits of medications. I would recommend he try it but ultimately decision is up to him.

## 2018-08-14 ENCOUNTER — TELEPHONE (OUTPATIENT)
Dept: FAMILY MEDICINE | Facility: CLINIC | Age: 83
End: 2018-08-14

## 2018-10-07 ENCOUNTER — HEALTH MAINTENANCE LETTER (OUTPATIENT)
Age: 83
End: 2018-10-07

## 2019-01-01 ENCOUNTER — INFUSION THERAPY VISIT (OUTPATIENT)
Dept: INFUSION THERAPY | Facility: CLINIC | Age: 84
End: 2019-01-01
Attending: INTERNAL MEDICINE
Payer: MEDICARE

## 2019-01-01 ENCOUNTER — HOSPITAL LABORATORY (OUTPATIENT)
Facility: OTHER | Age: 84
End: 2019-01-01

## 2019-01-01 ENCOUNTER — APPOINTMENT (OUTPATIENT)
Dept: CARDIOLOGY | Facility: CLINIC | Age: 84
DRG: 722 | End: 2019-01-01
Attending: PHYSICIAN ASSISTANT
Payer: MEDICARE

## 2019-01-01 ENCOUNTER — TELEPHONE (OUTPATIENT)
Dept: FAMILY MEDICINE | Facility: CLINIC | Age: 84
End: 2019-01-01

## 2019-01-01 ENCOUNTER — NURSING HOME VISIT (OUTPATIENT)
Dept: GERIATRICS | Facility: CLINIC | Age: 84
End: 2019-01-01
Payer: MEDICARE

## 2019-01-01 ENCOUNTER — TELEPHONE (OUTPATIENT)
Dept: GERIATRICS | Facility: CLINIC | Age: 84
End: 2019-01-01

## 2019-01-01 ENCOUNTER — DOCUMENTATION ONLY (OUTPATIENT)
Dept: OTHER | Facility: CLINIC | Age: 84
End: 2019-01-01

## 2019-01-01 ENCOUNTER — OFFICE VISIT (OUTPATIENT)
Dept: UROLOGY | Facility: CLINIC | Age: 84
End: 2019-01-01
Payer: MEDICARE

## 2019-01-01 ENCOUNTER — TELEPHONE (OUTPATIENT)
Dept: UROLOGY | Facility: CLINIC | Age: 84
End: 2019-01-01

## 2019-01-01 ENCOUNTER — PATIENT OUTREACH (OUTPATIENT)
Dept: CARE COORDINATION | Facility: CLINIC | Age: 84
End: 2019-01-01

## 2019-01-01 ENCOUNTER — HOSPITAL ENCOUNTER (EMERGENCY)
Facility: CLINIC | Age: 84
Discharge: HOME | End: 2019-05-24
Attending: FAMILY MEDICINE | Admitting: FAMILY MEDICINE
Payer: MEDICARE

## 2019-01-01 ENCOUNTER — NURSE TRIAGE (OUTPATIENT)
Dept: FAMILY MEDICINE | Facility: CLINIC | Age: 84
End: 2019-01-01

## 2019-01-01 ENCOUNTER — OFFICE VISIT (OUTPATIENT)
Dept: FAMILY MEDICINE | Facility: CLINIC | Age: 84
End: 2019-01-01
Payer: MEDICARE

## 2019-01-01 ENCOUNTER — OFFICE VISIT (OUTPATIENT)
Dept: SURGERY | Facility: CLINIC | Age: 84
End: 2019-01-01
Payer: MEDICARE

## 2019-01-01 ENCOUNTER — HOSPITAL ENCOUNTER (INPATIENT)
Facility: CLINIC | Age: 84
LOS: 4 days | Discharge: NURSING, REHAB OR LONG-TERM CARE | DRG: 722 | End: 2019-10-04
Attending: FAMILY MEDICINE | Admitting: INTERNAL MEDICINE
Payer: MEDICARE

## 2019-01-01 ENCOUNTER — HOSPITAL ENCOUNTER (EMERGENCY)
Facility: CLINIC | Age: 84
Discharge: HOME | End: 2019-05-20
Attending: EMERGENCY MEDICINE | Admitting: EMERGENCY MEDICINE
Payer: MEDICARE

## 2019-01-01 ENCOUNTER — HOSPITAL ENCOUNTER (OUTPATIENT)
Facility: CLINIC | Age: 84
Setting detail: OBSERVATION
Discharge: NURSING, REHAB OR LONG-TERM CARE | End: 2019-10-20
Attending: FAMILY MEDICINE | Admitting: INTERNAL MEDICINE
Payer: MEDICARE

## 2019-01-01 ENCOUNTER — APPOINTMENT (OUTPATIENT)
Dept: OCCUPATIONAL THERAPY | Facility: CLINIC | Age: 84
DRG: 722 | End: 2019-01-01
Payer: MEDICARE

## 2019-01-01 ENCOUNTER — HOSPITAL ENCOUNTER (OUTPATIENT)
Dept: CT IMAGING | Facility: CLINIC | Age: 84
Discharge: HOME | End: 2019-09-26
Attending: UROLOGY | Admitting: UROLOGY
Payer: MEDICARE

## 2019-01-01 ENCOUNTER — NURSE TRIAGE (OUTPATIENT)
Dept: NURSING | Facility: CLINIC | Age: 84
End: 2019-01-01

## 2019-01-01 ENCOUNTER — APPOINTMENT (OUTPATIENT)
Dept: CT IMAGING | Facility: CLINIC | Age: 84
DRG: 722 | End: 2019-01-01
Attending: FAMILY MEDICINE
Payer: MEDICARE

## 2019-01-01 ENCOUNTER — DOCUMENTATION ONLY (OUTPATIENT)
Dept: INFUSION THERAPY | Facility: CLINIC | Age: 84
End: 2019-01-01

## 2019-01-01 ENCOUNTER — APPOINTMENT (OUTPATIENT)
Dept: PHYSICAL THERAPY | Facility: CLINIC | Age: 84
DRG: 722 | End: 2019-01-01
Payer: MEDICARE

## 2019-01-01 ENCOUNTER — HOSPITAL ENCOUNTER (OUTPATIENT)
Facility: CLINIC | Age: 84
Discharge: HOME | End: 2019-10-07
Payer: MEDICARE

## 2019-01-01 ENCOUNTER — PRE VISIT (OUTPATIENT)
Dept: ONCOLOGY | Facility: CLINIC | Age: 84
End: 2019-01-01

## 2019-01-01 ENCOUNTER — INFUSION THERAPY VISIT (OUTPATIENT)
Dept: INFUSION THERAPY | Facility: CLINIC | Age: 84
End: 2019-01-01
Payer: MEDICARE

## 2019-01-01 ENCOUNTER — HOSPITAL ENCOUNTER (EMERGENCY)
Facility: CLINIC | Age: 84
Discharge: HOME | End: 2019-05-26
Attending: FAMILY MEDICINE | Admitting: FAMILY MEDICINE
Payer: MEDICARE

## 2019-01-01 ENCOUNTER — OFFICE VISIT (OUTPATIENT)
Dept: UROLOGY | Facility: CLINIC | Age: 84
End: 2019-01-01
Attending: FAMILY MEDICINE
Payer: MEDICARE

## 2019-01-01 ENCOUNTER — HOSPITAL ENCOUNTER (OUTPATIENT)
Facility: CLINIC | Age: 84
Discharge: NURSING, REHAB OR LONG-TERM CARE | End: 2019-10-16
Attending: FAMILY MEDICINE | Admitting: INTERNAL MEDICINE
Payer: MEDICARE

## 2019-01-01 ENCOUNTER — ONCOLOGY VISIT (OUTPATIENT)
Dept: ONCOLOGY | Facility: CLINIC | Age: 84
End: 2019-01-01
Attending: INTERNAL MEDICINE
Payer: MEDICARE

## 2019-01-01 ENCOUNTER — TELEPHONE (OUTPATIENT)
Dept: EMERGENCY MEDICINE | Facility: CLINIC | Age: 84
End: 2019-01-01

## 2019-01-01 ENCOUNTER — PATIENT OUTREACH (OUTPATIENT)
Dept: ONCOLOGY | Facility: CLINIC | Age: 84
End: 2019-01-01

## 2019-01-01 VITALS
OXYGEN SATURATION: 92 % | BODY MASS INDEX: 20.73 KG/M2 | WEIGHT: 140 LBS | SYSTOLIC BLOOD PRESSURE: 134 MMHG | RESPIRATION RATE: 12 BRPM | HEART RATE: 54 BPM | DIASTOLIC BLOOD PRESSURE: 49 MMHG | TEMPERATURE: 98.7 F | HEIGHT: 69 IN

## 2019-01-01 VITALS
TEMPERATURE: 98 F | HEIGHT: 69 IN | WEIGHT: 160 LBS | RESPIRATION RATE: 16 BRPM | DIASTOLIC BLOOD PRESSURE: 55 MMHG | OXYGEN SATURATION: 95 % | BODY MASS INDEX: 23.7 KG/M2 | HEART RATE: 65 BPM | SYSTOLIC BLOOD PRESSURE: 132 MMHG

## 2019-01-01 VITALS
DIASTOLIC BLOOD PRESSURE: 59 MMHG | BODY MASS INDEX: 23.33 KG/M2 | WEIGHT: 158 LBS | SYSTOLIC BLOOD PRESSURE: 150 MMHG | RESPIRATION RATE: 16 BRPM | OXYGEN SATURATION: 96 % | HEART RATE: 60 BPM | TEMPERATURE: 97.3 F

## 2019-01-01 VITALS
SYSTOLIC BLOOD PRESSURE: 138 MMHG | BODY MASS INDEX: 21 KG/M2 | DIASTOLIC BLOOD PRESSURE: 52 MMHG | OXYGEN SATURATION: 95 % | HEART RATE: 66 BPM | WEIGHT: 158 LBS | HEIGHT: 69 IN | SYSTOLIC BLOOD PRESSURE: 151 MMHG | OXYGEN SATURATION: 94 % | RESPIRATION RATE: 16 BRPM | TEMPERATURE: 98.4 F | DIASTOLIC BLOOD PRESSURE: 58 MMHG | RESPIRATION RATE: 16 BRPM | TEMPERATURE: 97.4 F | HEIGHT: 69 IN | BODY MASS INDEX: 23.4 KG/M2 | WEIGHT: 141.76 LBS | HEART RATE: 52 BPM

## 2019-01-01 VITALS
DIASTOLIC BLOOD PRESSURE: 59 MMHG | RESPIRATION RATE: 16 BRPM | HEART RATE: 61 BPM | SYSTOLIC BLOOD PRESSURE: 129 MMHG | TEMPERATURE: 96.9 F

## 2019-01-01 VITALS
TEMPERATURE: 97.4 F | DIASTOLIC BLOOD PRESSURE: 50 MMHG | SYSTOLIC BLOOD PRESSURE: 128 MMHG | OXYGEN SATURATION: 96 % | HEART RATE: 50 BPM | RESPIRATION RATE: 16 BRPM | WEIGHT: 146.61 LBS | HEIGHT: 69 IN | BODY MASS INDEX: 21.71 KG/M2

## 2019-01-01 VITALS
HEIGHT: 69 IN | BODY MASS INDEX: 23.4 KG/M2 | OXYGEN SATURATION: 96 % | WEIGHT: 158 LBS | SYSTOLIC BLOOD PRESSURE: 100 MMHG | DIASTOLIC BLOOD PRESSURE: 59 MMHG | TEMPERATURE: 98.5 F | RESPIRATION RATE: 16 BRPM

## 2019-01-01 VITALS
HEIGHT: 69 IN | WEIGHT: 160 LBS | BODY MASS INDEX: 23.7 KG/M2 | RESPIRATION RATE: 16 BRPM | SYSTOLIC BLOOD PRESSURE: 148 MMHG | OXYGEN SATURATION: 97 % | DIASTOLIC BLOOD PRESSURE: 68 MMHG | TEMPERATURE: 97.9 F | HEART RATE: 62 BPM

## 2019-01-01 VITALS
DIASTOLIC BLOOD PRESSURE: 48 MMHG | SYSTOLIC BLOOD PRESSURE: 114 MMHG | HEIGHT: 69 IN | TEMPERATURE: 97.6 F | OXYGEN SATURATION: 95 % | WEIGHT: 145 LBS | RESPIRATION RATE: 16 BRPM | BODY MASS INDEX: 21.48 KG/M2 | HEART RATE: 51 BPM

## 2019-01-01 VITALS
BODY MASS INDEX: 21.24 KG/M2 | DIASTOLIC BLOOD PRESSURE: 37 MMHG | SYSTOLIC BLOOD PRESSURE: 85 MMHG | HEART RATE: 60 BPM | WEIGHT: 143.4 LBS | HEIGHT: 69 IN | TEMPERATURE: 97.5 F

## 2019-01-01 VITALS
RESPIRATION RATE: 18 BRPM | DIASTOLIC BLOOD PRESSURE: 36 MMHG | WEIGHT: 145 LBS | OXYGEN SATURATION: 96 % | SYSTOLIC BLOOD PRESSURE: 120 MMHG | BODY MASS INDEX: 21.48 KG/M2 | TEMPERATURE: 96.1 F | HEIGHT: 69 IN | HEART RATE: 54 BPM

## 2019-01-01 VITALS
HEIGHT: 69 IN | TEMPERATURE: 97.6 F | HEART RATE: 51 BPM | DIASTOLIC BLOOD PRESSURE: 44 MMHG | OXYGEN SATURATION: 96 % | RESPIRATION RATE: 16 BRPM | WEIGHT: 143 LBS | BODY MASS INDEX: 21.18 KG/M2 | SYSTOLIC BLOOD PRESSURE: 131 MMHG

## 2019-01-01 VITALS
TEMPERATURE: 97.5 F | BODY MASS INDEX: 22.25 KG/M2 | HEIGHT: 69 IN | OXYGEN SATURATION: 96 % | SYSTOLIC BLOOD PRESSURE: 154 MMHG | RESPIRATION RATE: 16 BRPM | HEART RATE: 56 BPM | WEIGHT: 150.2 LBS | DIASTOLIC BLOOD PRESSURE: 58 MMHG

## 2019-01-01 VITALS
OXYGEN SATURATION: 96 % | SYSTOLIC BLOOD PRESSURE: 137 MMHG | WEIGHT: 158 LBS | RESPIRATION RATE: 16 BRPM | HEART RATE: 67 BPM | DIASTOLIC BLOOD PRESSURE: 63 MMHG | HEIGHT: 69 IN | BODY MASS INDEX: 23.4 KG/M2

## 2019-01-01 VITALS
RESPIRATION RATE: 18 BRPM | BODY MASS INDEX: 21.41 KG/M2 | SYSTOLIC BLOOD PRESSURE: 114 MMHG | WEIGHT: 145 LBS | OXYGEN SATURATION: 95 % | TEMPERATURE: 97.6 F | DIASTOLIC BLOOD PRESSURE: 48 MMHG | HEART RATE: 51 BPM

## 2019-01-01 VITALS
HEIGHT: 69 IN | WEIGHT: 156 LBS | DIASTOLIC BLOOD PRESSURE: 54 MMHG | SYSTOLIC BLOOD PRESSURE: 141 MMHG | OXYGEN SATURATION: 96 % | TEMPERATURE: 97.8 F | RESPIRATION RATE: 15 BRPM | BODY MASS INDEX: 23.11 KG/M2 | HEART RATE: 77 BPM

## 2019-01-01 VITALS
DIASTOLIC BLOOD PRESSURE: 53 MMHG | TEMPERATURE: 96.9 F | BODY MASS INDEX: 21.45 KG/M2 | SYSTOLIC BLOOD PRESSURE: 115 MMHG | WEIGHT: 149.8 LBS | HEIGHT: 70 IN | HEART RATE: 74 BPM

## 2019-01-01 VITALS
HEART RATE: 52 BPM | RESPIRATION RATE: 18 BRPM | DIASTOLIC BLOOD PRESSURE: 43 MMHG | SYSTOLIC BLOOD PRESSURE: 142 MMHG | TEMPERATURE: 96.4 F

## 2019-01-01 VITALS
RESPIRATION RATE: 20 BRPM | OXYGEN SATURATION: 95 % | SYSTOLIC BLOOD PRESSURE: 124 MMHG | DIASTOLIC BLOOD PRESSURE: 49 MMHG | TEMPERATURE: 98.4 F | BODY MASS INDEX: 20.73 KG/M2 | WEIGHT: 140 LBS | HEART RATE: 50 BPM | HEIGHT: 69 IN

## 2019-01-01 VITALS
DIASTOLIC BLOOD PRESSURE: 53 MMHG | RESPIRATION RATE: 16 BRPM | BODY MASS INDEX: 20.73 KG/M2 | HEART RATE: 57 BPM | TEMPERATURE: 98.1 F | WEIGHT: 140 LBS | OXYGEN SATURATION: 95 % | HEIGHT: 69 IN | SYSTOLIC BLOOD PRESSURE: 119 MMHG

## 2019-01-01 VITALS
SYSTOLIC BLOOD PRESSURE: 137 MMHG | TEMPERATURE: 98.6 F | OXYGEN SATURATION: 96 % | DIASTOLIC BLOOD PRESSURE: 56 MMHG | WEIGHT: 142 LBS | HEIGHT: 69 IN | RESPIRATION RATE: 16 BRPM | BODY MASS INDEX: 21.03 KG/M2 | HEART RATE: 55 BPM

## 2019-01-01 VITALS
RESPIRATION RATE: 18 BRPM | SYSTOLIC BLOOD PRESSURE: 161 MMHG | TEMPERATURE: 96.8 F | HEART RATE: 51 BPM | DIASTOLIC BLOOD PRESSURE: 51 MMHG

## 2019-01-01 VITALS
DIASTOLIC BLOOD PRESSURE: 68 MMHG | SYSTOLIC BLOOD PRESSURE: 148 MMHG | TEMPERATURE: 97.5 F | TEMPERATURE: 98.2 F | BODY MASS INDEX: 23.33 KG/M2 | BODY MASS INDEX: 23.33 KG/M2 | WEIGHT: 158 LBS | DIASTOLIC BLOOD PRESSURE: 58 MMHG | SYSTOLIC BLOOD PRESSURE: 127 MMHG | HEART RATE: 67 BPM | WEIGHT: 158 LBS | HEART RATE: 60 BPM

## 2019-01-01 VITALS — DIASTOLIC BLOOD PRESSURE: 59 MMHG | HEART RATE: 59 BPM | SYSTOLIC BLOOD PRESSURE: 137 MMHG | RESPIRATION RATE: 16 BRPM

## 2019-01-01 DIAGNOSIS — I35.0 AORTIC VALVE STENOSIS, ETIOLOGY OF CARDIAC VALVE DISEASE UNSPECIFIED: ICD-10-CM

## 2019-01-01 DIAGNOSIS — D64.9 ANEMIA, UNSPECIFIED TYPE: ICD-10-CM

## 2019-01-01 DIAGNOSIS — F41.9 ANXIETY: ICD-10-CM

## 2019-01-01 DIAGNOSIS — M10.9 GOUT, UNSPECIFIED CAUSE, UNSPECIFIED CHRONICITY, UNSPECIFIED SITE: ICD-10-CM

## 2019-01-01 DIAGNOSIS — C78.02 MALIGNANT NEOPLASM METASTATIC TO BOTH LUNGS (H): ICD-10-CM

## 2019-01-01 DIAGNOSIS — K59.01 SLOW TRANSIT CONSTIPATION: ICD-10-CM

## 2019-01-01 DIAGNOSIS — E78.5 HYPERLIPIDEMIA LDL GOAL <100: ICD-10-CM

## 2019-01-01 DIAGNOSIS — R33.9 URINARY RETENTION: Primary | ICD-10-CM

## 2019-01-01 DIAGNOSIS — Z12.5 PROSTATE CANCER SCREENING: ICD-10-CM

## 2019-01-01 DIAGNOSIS — I10 ESSENTIAL HYPERTENSION: ICD-10-CM

## 2019-01-01 DIAGNOSIS — R55 SYNCOPE, UNSPECIFIED SYNCOPE TYPE: ICD-10-CM

## 2019-01-01 DIAGNOSIS — R31.0 GROSS HEMATURIA: ICD-10-CM

## 2019-01-01 DIAGNOSIS — R39.9 UTI SYMPTOMS: ICD-10-CM

## 2019-01-01 DIAGNOSIS — C7B.8 NEUROENDOCRINE CARCINOMA METASTATIC TO LUNG (H): ICD-10-CM

## 2019-01-01 DIAGNOSIS — D63.0 ANEMIA IN NEOPLASTIC DISEASE: ICD-10-CM

## 2019-01-01 DIAGNOSIS — D63.0 ANEMIA IN NEOPLASTIC DISEASE: Primary | ICD-10-CM

## 2019-01-01 DIAGNOSIS — Z87.440 PERSONAL HISTORY OF URINARY TRACT INFECTION: Primary | ICD-10-CM

## 2019-01-01 DIAGNOSIS — R53.0 NEOPLASTIC MALIGNANT RELATED FATIGUE: ICD-10-CM

## 2019-01-01 DIAGNOSIS — Z97.8 CHRONIC INDWELLING FOLEY CATHETER: ICD-10-CM

## 2019-01-01 DIAGNOSIS — I10 HYPERTENSION GOAL BP (BLOOD PRESSURE) < 150/90: ICD-10-CM

## 2019-01-01 DIAGNOSIS — R31.9 ESSENTIAL HEMATURIA: ICD-10-CM

## 2019-01-01 DIAGNOSIS — C7A.8 NEUROENDOCRINE CARCINOMA METASTATIC TO LUNG (H): Primary | ICD-10-CM

## 2019-01-01 DIAGNOSIS — R53.0 NEOPLASTIC (MALIGNANT) RELATED FATIGUE: ICD-10-CM

## 2019-01-01 DIAGNOSIS — E43 SEVERE PROTEIN-CALORIE MALNUTRITION (H): ICD-10-CM

## 2019-01-01 DIAGNOSIS — R33.9 URINARY RETENTION WITH INCOMPLETE BLADDER EMPTYING: ICD-10-CM

## 2019-01-01 DIAGNOSIS — C7A.8 NEUROENDOCRINE CARCINOMA METASTATIC TO LUNG (H): ICD-10-CM

## 2019-01-01 DIAGNOSIS — R55 SYNCOPE AND COLLAPSE: ICD-10-CM

## 2019-01-01 DIAGNOSIS — D62 ANEMIA DUE TO BLOOD LOSS, ACUTE: ICD-10-CM

## 2019-01-01 DIAGNOSIS — N30.01 ACUTE CYSTITIS WITH HEMATURIA: ICD-10-CM

## 2019-01-01 DIAGNOSIS — N48.1 BALANITIS: ICD-10-CM

## 2019-01-01 DIAGNOSIS — C79.9 METASTATIC CANCER (H): ICD-10-CM

## 2019-01-01 DIAGNOSIS — R55 SYNCOPE, UNSPECIFIED SYNCOPE TYPE: Primary | ICD-10-CM

## 2019-01-01 DIAGNOSIS — C7A.1 LARGE CELL NEUROENDOCRINE CARCINOMA (H): Primary | ICD-10-CM

## 2019-01-01 DIAGNOSIS — R68.89 SUSPECTED MALIGNANT NEOPLASM: Primary | ICD-10-CM

## 2019-01-01 DIAGNOSIS — R33.9 URINARY RETENTION: ICD-10-CM

## 2019-01-01 DIAGNOSIS — Z97.8 FOLEY CATHETER IN PLACE: ICD-10-CM

## 2019-01-01 DIAGNOSIS — R53.81 PHYSICAL DECONDITIONING: ICD-10-CM

## 2019-01-01 DIAGNOSIS — C61 PROSTATE CANCER METASTATIC TO MULTIPLE SITES (H): ICD-10-CM

## 2019-01-01 DIAGNOSIS — R53.1 WEAKNESS GENERALIZED: ICD-10-CM

## 2019-01-01 DIAGNOSIS — R53.1 GENERALIZED WEAKNESS: ICD-10-CM

## 2019-01-01 DIAGNOSIS — R68.89 SUSPECTED MALIGNANT NEOPLASM: ICD-10-CM

## 2019-01-01 DIAGNOSIS — R62.7 ADULT FAILURE TO THRIVE: ICD-10-CM

## 2019-01-01 DIAGNOSIS — Z71.89 OTHER SPECIFIED COUNSELING: ICD-10-CM

## 2019-01-01 DIAGNOSIS — R31.9 HEMATURIA, UNSPECIFIED TYPE: ICD-10-CM

## 2019-01-01 DIAGNOSIS — C78.01 MALIGNANT NEOPLASM METASTATIC TO BOTH LUNGS (H): ICD-10-CM

## 2019-01-01 DIAGNOSIS — R01.1 UNDIAGNOSED CARDIAC MURMURS: ICD-10-CM

## 2019-01-01 DIAGNOSIS — C61 MALIGNANT NEOPLASM OF PROSTATE (H): ICD-10-CM

## 2019-01-01 DIAGNOSIS — C79.51 SECONDARY MALIGNANT NEOPLASM OF BONE AND BONE MARROW (H): ICD-10-CM

## 2019-01-01 DIAGNOSIS — Z71.89 ACP (ADVANCE CARE PLANNING): ICD-10-CM

## 2019-01-01 DIAGNOSIS — C78.00 MALIGNANT NEOPLASM METASTATIC TO LUNG, UNSPECIFIED LATERALITY (H): ICD-10-CM

## 2019-01-01 DIAGNOSIS — T83.511A URINARY TRACT INFECTION ASSOCIATED WITH INDWELLING URETHRAL CATHETER, INITIAL ENCOUNTER (H): Primary | ICD-10-CM

## 2019-01-01 DIAGNOSIS — R33.9 URINE RETENTION: ICD-10-CM

## 2019-01-01 DIAGNOSIS — R45.1 RESTLESSNESS AND AGITATION: Primary | ICD-10-CM

## 2019-01-01 DIAGNOSIS — C79.52 SECONDARY MALIGNANT NEOPLASM OF BONE AND BONE MARROW (H): ICD-10-CM

## 2019-01-01 DIAGNOSIS — C80.1 MALIGNANT NEOPLASM (H): Primary | ICD-10-CM

## 2019-01-01 DIAGNOSIS — R19.00 PELVIC MASS: ICD-10-CM

## 2019-01-01 DIAGNOSIS — B96.20 UNSPECIFIED ESCHERICHIA COLI (E. COLI) AS THE CAUSE OF DISEASES CLASSIFIED ELSEWHERE: ICD-10-CM

## 2019-01-01 DIAGNOSIS — N40.0 BENIGN PROSTATIC HYPERPLASIA WITHOUT LOWER URINARY TRACT SYMPTOMS: Primary | ICD-10-CM

## 2019-01-01 DIAGNOSIS — D50.0 IRON DEFICIENCY ANEMIA DUE TO CHRONIC BLOOD LOSS: ICD-10-CM

## 2019-01-01 DIAGNOSIS — R91.8 PULMONARY NODULES: Primary | ICD-10-CM

## 2019-01-01 DIAGNOSIS — C7B.8 NEUROENDOCRINE CARCINOMA METASTATIC TO LUNG (H): Primary | ICD-10-CM

## 2019-01-01 DIAGNOSIS — N39.0 URINARY TRACT INFECTION WITHOUT HEMATURIA, SITE UNSPECIFIED: ICD-10-CM

## 2019-01-01 DIAGNOSIS — R31.0 GROSS HEMATURIA: Primary | ICD-10-CM

## 2019-01-01 DIAGNOSIS — C79.9 METASTATIC CANCER (H): Primary | ICD-10-CM

## 2019-01-01 DIAGNOSIS — N39.0 URINARY TRACT INFECTION WITHOUT HEMATURIA, SITE UNSPECIFIED: Primary | ICD-10-CM

## 2019-01-01 DIAGNOSIS — N39.0 URINARY TRACT INFECTION ASSOCIATED WITH INDWELLING URETHRAL CATHETER, INITIAL ENCOUNTER (H): Primary | ICD-10-CM

## 2019-01-01 DIAGNOSIS — D62 ACUTE BLOOD LOSS ANEMIA: ICD-10-CM

## 2019-01-01 DIAGNOSIS — C7A.8 NEURO-ENDOCRINE CARCINOMA (H): ICD-10-CM

## 2019-01-01 DIAGNOSIS — C80.1 MALIGNANT NEOPLASM (H): ICD-10-CM

## 2019-01-01 DIAGNOSIS — M54.6 ACUTE MIDLINE THORACIC BACK PAIN: ICD-10-CM

## 2019-01-01 DIAGNOSIS — Z00.00 WELL ADULT EXAM: Primary | ICD-10-CM

## 2019-01-01 DIAGNOSIS — R91.8 PULMONARY NODULES: ICD-10-CM

## 2019-01-01 DIAGNOSIS — I10 ESSENTIAL HYPERTENSION, BENIGN: ICD-10-CM

## 2019-01-01 DIAGNOSIS — N18.30 CKD (CHRONIC KIDNEY DISEASE) STAGE 3, GFR 30-59 ML/MIN (H): ICD-10-CM

## 2019-01-01 DIAGNOSIS — E86.0 DEHYDRATION: ICD-10-CM

## 2019-01-01 DIAGNOSIS — D64.9 ANEMIA: Primary | ICD-10-CM

## 2019-01-01 DIAGNOSIS — R19.00 PELVIC MASS: Primary | ICD-10-CM

## 2019-01-01 LAB
ABO + RH BLD: NORMAL
ALBUMIN SERPL-MCNC: 2.5 G/DL (ref 3.4–5)
ALBUMIN SERPL-MCNC: 2.8 G/DL (ref 3.4–5)
ALBUMIN SERPL-MCNC: 2.9 G/DL (ref 3.4–5)
ALBUMIN SERPL-MCNC: 3.3 G/DL (ref 3.4–5)
ALBUMIN UR-MCNC: 100 MG/DL
ALBUMIN UR-MCNC: 30 MG/DL
ALBUMIN UR-MCNC: 30 MG/DL
ALBUMIN UR-MCNC: >499 MG/DL
ALP SERPL-CCNC: 57 U/L (ref 40–150)
ALP SERPL-CCNC: 64 U/L (ref 40–150)
ALP SERPL-CCNC: 67 U/L (ref 40–150)
ALP SERPL-CCNC: 72 U/L (ref 40–150)
ALT SERPL W P-5'-P-CCNC: 13 U/L (ref 0–70)
ALT SERPL W P-5'-P-CCNC: 19 U/L (ref 0–70)
ALT SERPL W P-5'-P-CCNC: 21 U/L (ref 0–70)
ALT SERPL W P-5'-P-CCNC: 21 U/L (ref 0–70)
AMORPH CRY #/AREA URNS HPF: ABNORMAL /HPF
ANION GAP SERPL CALCULATED.3IONS-SCNC: 10 MMOL/L (ref 3–14)
ANION GAP SERPL CALCULATED.3IONS-SCNC: 12 MMOL/L (ref 3–14)
ANION GAP SERPL CALCULATED.3IONS-SCNC: 14 MMOL/L (ref 3–14)
ANION GAP SERPL CALCULATED.3IONS-SCNC: 5 MMOL/L (ref 3–14)
ANION GAP SERPL CALCULATED.3IONS-SCNC: 6 MMOL/L (ref 3–14)
ANION GAP SERPL CALCULATED.3IONS-SCNC: 7 MMOL/L (ref 3–14)
ANION GAP SERPL CALCULATED.3IONS-SCNC: 8 MMOL/L (ref 3–14)
ANION GAP SERPL CALCULATED.3IONS-SCNC: 8 MMOL/L (ref 3–14)
ANION GAP SERPL CALCULATED.3IONS-SCNC: 9 MMOL/L (ref 3–14)
APPEARANCE UR: ABNORMAL
APPEARANCE UR: CLEAR
AST SERPL W P-5'-P-CCNC: 26 U/L (ref 0–45)
AST SERPL W P-5'-P-CCNC: 26 U/L (ref 0–45)
AST SERPL W P-5'-P-CCNC: 27 U/L (ref 0–45)
AST SERPL W P-5'-P-CCNC: 28 U/L (ref 0–45)
BACTERIA #/AREA URNS HPF: ABNORMAL /HPF
BACTERIA SPEC CULT: ABNORMAL
BACTERIA SPEC CULT: NO GROWTH
BACTERIA SPEC CULT: NORMAL
BASOPHILS # BLD AUTO: 0.1 10E9/L (ref 0–0.2)
BASOPHILS NFR BLD AUTO: 0.6 %
BASOPHILS NFR BLD AUTO: 0.7 %
BASOPHILS NFR BLD AUTO: 0.8 %
BASOPHILS NFR BLD AUTO: 0.8 %
BASOPHILS NFR BLD AUTO: 0.9 %
BASOPHILS NFR BLD AUTO: 1.2 %
BILIRUB SERPL-MCNC: 0.2 MG/DL (ref 0.2–1.3)
BILIRUB SERPL-MCNC: 0.2 MG/DL (ref 0.2–1.3)
BILIRUB SERPL-MCNC: 0.4 MG/DL (ref 0.2–1.3)
BILIRUB SERPL-MCNC: 0.4 MG/DL (ref 0.2–1.3)
BILIRUB UR QL STRIP: NEGATIVE
BLD GP AB SCN SERPL QL: NORMAL
BLD PROD TYP BPU: NORMAL
BLD UNIT ID BPU: 0
BLOOD BANK CMNT PATIENT-IMP: NORMAL
BLOOD PRODUCT CODE: NORMAL
BPU ID: NORMAL
BUN SERPL-MCNC: 14 MG/DL (ref 7–30)
BUN SERPL-MCNC: 15 MG/DL (ref 7–30)
BUN SERPL-MCNC: 23 MG/DL (ref 7–30)
BUN SERPL-MCNC: 25 MG/DL (ref 7–30)
BUN SERPL-MCNC: 30 MG/DL (ref 7–30)
BUN SERPL-MCNC: 31 MG/DL (ref 7–30)
BUN SERPL-MCNC: 33 MG/DL (ref 7–30)
BUN SERPL-MCNC: 35 MG/DL (ref 7–30)
BUN SERPL-MCNC: 38 MG/DL (ref 7–30)
BUN SERPL-MCNC: 40 MG/DL (ref 7–30)
BUN SERPL-MCNC: 46 MG/DL (ref 7–30)
BUN SERPL-MCNC: 60 MG/DL (ref 7–30)
CALCIUM SERPL-MCNC: 8.4 MG/DL (ref 8.5–10.1)
CALCIUM SERPL-MCNC: 8.5 MG/DL (ref 8.5–10.1)
CALCIUM SERPL-MCNC: 8.7 MG/DL (ref 8.5–10.1)
CALCIUM SERPL-MCNC: 8.9 MG/DL (ref 8.5–10.1)
CALCIUM SERPL-MCNC: 9 MG/DL (ref 8.5–10.1)
CALCIUM SERPL-MCNC: 9 MG/DL (ref 8.5–10.1)
CALCIUM SERPL-MCNC: 9.1 MG/DL (ref 8.5–10.1)
CALCIUM SERPL-MCNC: 9.1 MG/DL (ref 8.5–10.1)
CALCIUM SERPL-MCNC: 9.2 MG/DL (ref 8.5–10.1)
CALCIUM SERPL-MCNC: 9.4 MG/DL (ref 8.5–10.1)
CALCIUM SERPL-MCNC: 9.7 MG/DL (ref 8.5–10.1)
CALCIUM SERPL-MCNC: 9.9 MG/DL (ref 8.5–10.1)
CHLORIDE SERPL-SCNC: 101 MMOL/L (ref 94–109)
CHLORIDE SERPL-SCNC: 101 MMOL/L (ref 94–109)
CHLORIDE SERPL-SCNC: 102 MMOL/L (ref 94–109)
CHLORIDE SERPL-SCNC: 103 MMOL/L (ref 94–109)
CHLORIDE SERPL-SCNC: 103 MMOL/L (ref 94–109)
CHLORIDE SERPL-SCNC: 104 MMOL/L (ref 94–109)
CHLORIDE SERPL-SCNC: 104 MMOL/L (ref 94–109)
CHLORIDE SERPL-SCNC: 105 MMOL/L (ref 94–109)
CHLORIDE SERPL-SCNC: 106 MMOL/L (ref 94–109)
CHLORIDE SERPL-SCNC: 110 MMOL/L (ref 94–109)
CHLORIDE SERPL-SCNC: 110 MMOL/L (ref 94–109)
CHLORIDE SERPL-SCNC: 97 MMOL/L (ref 94–109)
CHOLEST SERPL-MCNC: 148 MG/DL
CO2 SERPL-SCNC: 19 MMOL/L (ref 20–32)
CO2 SERPL-SCNC: 21 MMOL/L (ref 20–32)
CO2 SERPL-SCNC: 21 MMOL/L (ref 20–32)
CO2 SERPL-SCNC: 22 MMOL/L (ref 20–32)
CO2 SERPL-SCNC: 23 MMOL/L (ref 20–32)
CO2 SERPL-SCNC: 24 MMOL/L (ref 20–32)
CO2 SERPL-SCNC: 25 MMOL/L (ref 20–32)
CO2 SERPL-SCNC: 26 MMOL/L (ref 20–32)
CO2 SERPL-SCNC: 26 MMOL/L (ref 20–32)
CO2 SERPL-SCNC: 27 MMOL/L (ref 20–32)
COLOR UR AUTO: ABNORMAL
COLOR UR AUTO: YELLOW
COLOR UR AUTO: YELLOW
COPATH REPORT: NORMAL
CREAT BLD-MCNC: 1.5 MG/DL (ref 0.66–1.25)
CREAT SERPL-MCNC: 1.14 MG/DL (ref 0.66–1.25)
CREAT SERPL-MCNC: 1.19 MG/DL (ref 0.66–1.25)
CREAT SERPL-MCNC: 1.2 MG/DL (ref 0.66–1.25)
CREAT SERPL-MCNC: 1.25 MG/DL (ref 0.66–1.25)
CREAT SERPL-MCNC: 1.27 MG/DL (ref 0.66–1.25)
CREAT SERPL-MCNC: 1.28 MG/DL (ref 0.66–1.25)
CREAT SERPL-MCNC: 1.29 MG/DL (ref 0.66–1.25)
CREAT SERPL-MCNC: 1.34 MG/DL (ref 0.66–1.25)
CREAT SERPL-MCNC: 1.36 MG/DL (ref 0.66–1.25)
CREAT SERPL-MCNC: 1.37 MG/DL (ref 0.66–1.25)
CREAT SERPL-MCNC: 1.44 MG/DL (ref 0.66–1.25)
CREAT SERPL-MCNC: 1.74 MG/DL (ref 0.66–1.25)
DIFFERENTIAL METHOD BLD: ABNORMAL
EOSINOPHIL # BLD AUTO: 0.3 10E9/L (ref 0–0.7)
EOSINOPHIL # BLD AUTO: 0.6 10E9/L (ref 0–0.7)
EOSINOPHIL # BLD AUTO: 0.9 10E9/L (ref 0–0.7)
EOSINOPHIL # BLD AUTO: 1 10E9/L (ref 0–0.7)
EOSINOPHIL # BLD AUTO: 1.1 10E9/L (ref 0–0.7)
EOSINOPHIL # BLD AUTO: 1.5 10E9/L (ref 0–0.7)
EOSINOPHIL NFR BLD AUTO: 11.9 %
EOSINOPHIL NFR BLD AUTO: 12.8 %
EOSINOPHIL NFR BLD AUTO: 14.3 %
EOSINOPHIL NFR BLD AUTO: 3.1 %
EOSINOPHIL NFR BLD AUTO: 6.9 %
EOSINOPHIL NFR BLD AUTO: 9.7 %
ERYTHROCYTE [DISTWIDTH] IN BLOOD BY AUTOMATED COUNT: 12.6 % (ref 10–15)
ERYTHROCYTE [DISTWIDTH] IN BLOOD BY AUTOMATED COUNT: 13.2 % (ref 10–15)
ERYTHROCYTE [DISTWIDTH] IN BLOOD BY AUTOMATED COUNT: 13.3 % (ref 10–15)
ERYTHROCYTE [DISTWIDTH] IN BLOOD BY AUTOMATED COUNT: 13.3 % (ref 10–15)
ERYTHROCYTE [DISTWIDTH] IN BLOOD BY AUTOMATED COUNT: 14.1 % (ref 10–15)
ERYTHROCYTE [DISTWIDTH] IN BLOOD BY AUTOMATED COUNT: 14.2 % (ref 10–15)
ERYTHROCYTE [DISTWIDTH] IN BLOOD BY AUTOMATED COUNT: 14.6 % (ref 10–15)
ERYTHROCYTE [DISTWIDTH] IN BLOOD BY AUTOMATED COUNT: 14.6 % (ref 10–15)
ERYTHROCYTE [DISTWIDTH] IN BLOOD BY AUTOMATED COUNT: 14.7 % (ref 10–15)
ERYTHROCYTE [DISTWIDTH] IN BLOOD BY AUTOMATED COUNT: 14.8 % (ref 10–15)
ERYTHROCYTE [DISTWIDTH] IN BLOOD BY AUTOMATED COUNT: 15 % (ref 10–15)
ERYTHROCYTE [DISTWIDTH] IN BLOOD BY AUTOMATED COUNT: 15.1 % (ref 10–15)
ERYTHROCYTE [DISTWIDTH] IN BLOOD BY AUTOMATED COUNT: 15.3 % (ref 10–15)
ERYTHROCYTE [DISTWIDTH] IN BLOOD BY AUTOMATED COUNT: 15.9 % (ref 10–15)
FERRITIN SERPL-MCNC: 278 NG/ML (ref 26–388)
FERRITIN SERPL-MCNC: 91 NG/ML (ref 26–388)
GFR SERPL CREATININE-BSD FRML MDRD: 35 ML/MIN/{1.73_M2}
GFR SERPL CREATININE-BSD FRML MDRD: 44 ML/MIN/{1.73_M2}
GFR SERPL CREATININE-BSD FRML MDRD: 44 ML/MIN/{1.73_M2}
GFR SERPL CREATININE-BSD FRML MDRD: 47 ML/MIN/{1.73_M2}
GFR SERPL CREATININE-BSD FRML MDRD: 47 ML/MIN/{1.73_M2}
GFR SERPL CREATININE-BSD FRML MDRD: 48 ML/MIN/{1.73_M2}
GFR SERPL CREATININE-BSD FRML MDRD: 50 ML/MIN/{1.73_M2}
GFR SERPL CREATININE-BSD FRML MDRD: 51 ML/MIN/{1.73_M2}
GFR SERPL CREATININE-BSD FRML MDRD: 51 ML/MIN/{1.73_M2}
GFR SERPL CREATININE-BSD FRML MDRD: 52 ML/MIN/{1.73_M2}
GFR SERPL CREATININE-BSD FRML MDRD: 54 ML/MIN/{1.73_M2}
GFR SERPL CREATININE-BSD FRML MDRD: 55 ML/MIN/{1.73_M2}
GFR SERPL CREATININE-BSD FRML MDRD: 58 ML/MIN/{1.73_M2}
GLUCOSE SERPL-MCNC: 106 MG/DL (ref 70–99)
GLUCOSE SERPL-MCNC: 107 MG/DL (ref 70–99)
GLUCOSE SERPL-MCNC: 110 MG/DL (ref 70–99)
GLUCOSE SERPL-MCNC: 113 MG/DL (ref 70–99)
GLUCOSE SERPL-MCNC: 115 MG/DL (ref 70–99)
GLUCOSE SERPL-MCNC: 79 MG/DL (ref 70–99)
GLUCOSE SERPL-MCNC: 81 MG/DL (ref 70–99)
GLUCOSE SERPL-MCNC: 82 MG/DL (ref 70–99)
GLUCOSE SERPL-MCNC: 84 MG/DL (ref 70–99)
GLUCOSE SERPL-MCNC: 85 MG/DL (ref 70–99)
GLUCOSE SERPL-MCNC: 92 MG/DL (ref 70–99)
GLUCOSE SERPL-MCNC: 94 MG/DL (ref 70–99)
GLUCOSE UR STRIP-MCNC: 50 MG/DL
GLUCOSE UR STRIP-MCNC: NEGATIVE MG/DL
HCT VFR BLD AUTO: 20.2 % (ref 40–53)
HCT VFR BLD AUTO: 20.3 % (ref 40–53)
HCT VFR BLD AUTO: 22.7 % (ref 40–53)
HCT VFR BLD AUTO: 23.7 % (ref 40–53)
HCT VFR BLD AUTO: 23.7 % (ref 40–53)
HCT VFR BLD AUTO: 23.8 % (ref 40–53)
HCT VFR BLD AUTO: 24.3 % (ref 40–53)
HCT VFR BLD AUTO: 26.8 % (ref 40–53)
HCT VFR BLD AUTO: 26.9 % (ref 40–53)
HCT VFR BLD AUTO: 28 % (ref 40–53)
HCT VFR BLD AUTO: 28.1 % (ref 40–53)
HCT VFR BLD AUTO: 28.7 % (ref 40–53)
HCT VFR BLD AUTO: 29 % (ref 40–53)
HCT VFR BLD AUTO: 32.8 % (ref 40–53)
HDLC SERPL-MCNC: 107 MG/DL
HGB BLD-MCNC: 10.8 G/DL (ref 13.3–17.7)
HGB BLD-MCNC: 6.7 G/DL (ref 13.3–17.7)
HGB BLD-MCNC: 6.8 G/DL (ref 13.3–17.7)
HGB BLD-MCNC: 6.9 G/DL (ref 13.3–17.7)
HGB BLD-MCNC: 7.1 G/DL (ref 13.3–17.7)
HGB BLD-MCNC: 7.5 G/DL (ref 13.3–17.7)
HGB BLD-MCNC: 7.6 G/DL (ref 13.3–17.7)
HGB BLD-MCNC: 7.8 G/DL (ref 13.3–17.7)
HGB BLD-MCNC: 7.9 G/DL (ref 13.3–17.7)
HGB BLD-MCNC: 7.9 G/DL (ref 13.3–17.7)
HGB BLD-MCNC: 8.1 G/DL (ref 13.3–17.7)
HGB BLD-MCNC: 8.5 G/DL (ref 13.3–17.7)
HGB BLD-MCNC: 8.6 G/DL (ref 13.3–17.7)
HGB BLD-MCNC: 8.7 G/DL (ref 13.3–17.7)
HGB BLD-MCNC: 8.8 G/DL (ref 13.3–17.7)
HGB BLD-MCNC: 8.9 G/DL (ref 13.3–17.7)
HGB BLD-MCNC: 9 G/DL (ref 13.3–17.7)
HGB BLD-MCNC: 9.2 G/DL (ref 13.3–17.7)
HGB BLD-MCNC: 9.3 G/DL (ref 13.3–17.7)
HGB BLD-MCNC: 9.3 G/DL (ref 13.3–17.7)
HGB BLD-MCNC: 9.4 G/DL (ref 13.3–17.7)
HGB BLD-MCNC: 9.7 G/DL (ref 13.3–17.7)
HGB BLD-MCNC: 9.9 G/DL (ref 13.3–17.7)
HGB BLD-MCNC: NORMAL G/DL (ref 13.3–17.7)
HGB UR QL STRIP: ABNORMAL
HYALINE CASTS #/AREA URNS LPF: 6 /LPF (ref 0–2)
IMM GRANULOCYTES # BLD: 0 10E9/L (ref 0–0.4)
IMM GRANULOCYTES NFR BLD: 0.2 %
IMM GRANULOCYTES NFR BLD: 0.3 %
IMM GRANULOCYTES NFR BLD: 0.3 %
IMM GRANULOCYTES NFR BLD: 0.4 %
INR PPP: 0.95 (ref 0.86–1.14)
IRON SATN MFR SERPL: 11 % (ref 15–46)
IRON SERPL-MCNC: 22 UG/DL (ref 35–180)
KETONES UR STRIP-MCNC: 20 MG/DL
KETONES UR STRIP-MCNC: NEGATIVE MG/DL
LACTATE BLD-SCNC: 1.7 MMOL/L (ref 0.7–2)
LDLC SERPL CALC-MCNC: 26 MG/DL
LEUKOCYTE ESTERASE UR QL STRIP: ABNORMAL
LEUKOCYTE ESTERASE UR QL STRIP: NEGATIVE
LYMPHOCYTES # BLD AUTO: 0.8 10E9/L (ref 0.8–5.3)
LYMPHOCYTES # BLD AUTO: 0.8 10E9/L (ref 0.8–5.3)
LYMPHOCYTES # BLD AUTO: 0.9 10E9/L (ref 0.8–5.3)
LYMPHOCYTES # BLD AUTO: 0.9 10E9/L (ref 0.8–5.3)
LYMPHOCYTES # BLD AUTO: 1.2 10E9/L (ref 0.8–5.3)
LYMPHOCYTES # BLD AUTO: 1.7 10E9/L (ref 0.8–5.3)
LYMPHOCYTES NFR BLD AUTO: 10 %
LYMPHOCYTES NFR BLD AUTO: 14 %
LYMPHOCYTES NFR BLD AUTO: 16 %
LYMPHOCYTES NFR BLD AUTO: 8.4 %
LYMPHOCYTES NFR BLD AUTO: 9.2 %
LYMPHOCYTES NFR BLD AUTO: 9.9 %
Lab: ABNORMAL
Lab: ABNORMAL
Lab: NORMAL
MCH RBC QN AUTO: 29.6 PG (ref 26.5–33)
MCH RBC QN AUTO: 29.8 PG (ref 26.5–33)
MCH RBC QN AUTO: 29.9 PG (ref 26.5–33)
MCH RBC QN AUTO: 30 PG (ref 26.5–33)
MCH RBC QN AUTO: 30.1 PG (ref 26.5–33)
MCH RBC QN AUTO: 30.2 PG (ref 26.5–33)
MCH RBC QN AUTO: 30.4 PG (ref 26.5–33)
MCH RBC QN AUTO: 30.5 PG (ref 26.5–33)
MCH RBC QN AUTO: 30.6 PG (ref 26.5–33)
MCH RBC QN AUTO: 30.8 PG (ref 26.5–33)
MCH RBC QN AUTO: 32.2 PG (ref 26.5–33)
MCH RBC QN AUTO: 33.4 PG (ref 26.5–33)
MCH RBC QN AUTO: 33.9 PG (ref 26.5–33)
MCH RBC QN AUTO: 34.4 PG (ref 26.5–33)
MCHC RBC AUTO-ENTMCNC: 32.1 G/DL (ref 31.5–36.5)
MCHC RBC AUTO-ENTMCNC: 32.4 G/DL (ref 31.5–36.5)
MCHC RBC AUTO-ENTMCNC: 32.7 G/DL (ref 31.5–36.5)
MCHC RBC AUTO-ENTMCNC: 32.9 G/DL (ref 31.5–36.5)
MCHC RBC AUTO-ENTMCNC: 33 G/DL (ref 31.5–36.5)
MCHC RBC AUTO-ENTMCNC: 33.2 G/DL (ref 31.5–36.5)
MCHC RBC AUTO-ENTMCNC: 33.3 G/DL (ref 31.5–36.5)
MCHC RBC AUTO-ENTMCNC: 33.3 G/DL (ref 31.5–36.5)
MCHC RBC AUTO-ENTMCNC: 33.5 G/DL (ref 31.5–36.5)
MCHC RBC AUTO-ENTMCNC: 33.6 G/DL (ref 31.5–36.5)
MCHC RBC AUTO-ENTMCNC: 33.7 G/DL (ref 31.5–36.5)
MCHC RBC AUTO-ENTMCNC: 34 G/DL (ref 31.5–36.5)
MCV RBC AUTO: 102 FL (ref 78–100)
MCV RBC AUTO: 103 FL (ref 78–100)
MCV RBC AUTO: 103 FL (ref 78–100)
MCV RBC AUTO: 89 FL (ref 78–100)
MCV RBC AUTO: 90 FL (ref 78–100)
MCV RBC AUTO: 90 FL (ref 78–100)
MCV RBC AUTO: 91 FL (ref 78–100)
MCV RBC AUTO: 92 FL (ref 78–100)
MCV RBC AUTO: 93 FL (ref 78–100)
MCV RBC AUTO: 94 FL (ref 78–100)
MCV RBC AUTO: 96 FL (ref 78–100)
MONOCYTES # BLD AUTO: 0.6 10E9/L (ref 0–1.3)
MONOCYTES # BLD AUTO: 0.7 10E9/L (ref 0–1.3)
MONOCYTES # BLD AUTO: 0.7 10E9/L (ref 0–1.3)
MONOCYTES # BLD AUTO: 0.8 10E9/L (ref 0–1.3)
MONOCYTES NFR BLD AUTO: 7 %
MONOCYTES NFR BLD AUTO: 7.2 %
MONOCYTES NFR BLD AUTO: 8.1 %
MONOCYTES NFR BLD AUTO: 8.6 %
MONOCYTES NFR BLD AUTO: 8.6 %
MONOCYTES NFR BLD AUTO: 9.1 %
MUCOUS THREADS #/AREA URNS LPF: PRESENT /LPF
MUCOUS THREADS #/AREA URNS LPF: PRESENT /LPF
NEUTROPHILS # BLD AUTO: 5.8 10E9/L (ref 1.6–8.3)
NEUTROPHILS # BLD AUTO: 5.9 10E9/L (ref 1.6–8.3)
NEUTROPHILS # BLD AUTO: 5.9 10E9/L (ref 1.6–8.3)
NEUTROPHILS # BLD AUTO: 6.3 10E9/L (ref 1.6–8.3)
NEUTROPHILS # BLD AUTO: 6.5 10E9/L (ref 1.6–8.3)
NEUTROPHILS # BLD AUTO: 7.3 10E9/L (ref 1.6–8.3)
NEUTROPHILS NFR BLD AUTO: 61 %
NEUTROPHILS NFR BLD AUTO: 67.3 %
NEUTROPHILS NFR BLD AUTO: 69.1 %
NEUTROPHILS NFR BLD AUTO: 70 %
NEUTROPHILS NFR BLD AUTO: 70.5 %
NEUTROPHILS NFR BLD AUTO: 80.7 %
NITRATE UR QL: NEGATIVE
NON-SQ EPI CELLS #/AREA URNS LPF: ABNORMAL /LPF
NONHDLC SERPL-MCNC: 41 MG/DL
NRBC # BLD AUTO: 0 10*3/UL
NRBC BLD AUTO-RTO: 0 /100
NT-PROBNP SERPL-MCNC: 2612 PG/ML (ref 0–1800)
NUM BPU REQUESTED: 1
NUM BPU REQUESTED: 2
PH UR STRIP: 5 PH (ref 5–7)
PH UR STRIP: 6 PH (ref 5–7)
PH UR STRIP: 6 PH (ref 5–7)
PH UR STRIP: 6.5 PH (ref 5–7)
PH UR STRIP: 6.5 PH (ref 5–7)
PH UR STRIP: 7 PH (ref 5–7)
PLATELET # BLD AUTO: 179 10E9/L (ref 150–450)
PLATELET # BLD AUTO: 183 10E9/L (ref 150–450)
PLATELET # BLD AUTO: 187 10E9/L (ref 150–450)
PLATELET # BLD AUTO: 188 10E9/L (ref 150–450)
PLATELET # BLD AUTO: 189 10E9/L (ref 150–450)
PLATELET # BLD AUTO: 198 10E9/L (ref 150–450)
PLATELET # BLD AUTO: 198 10E9/L (ref 150–450)
PLATELET # BLD AUTO: 210 10E9/L (ref 150–450)
PLATELET # BLD AUTO: 218 10E9/L (ref 150–450)
PLATELET # BLD AUTO: 221 10E9/L (ref 150–450)
PLATELET # BLD AUTO: 225 10E9/L (ref 150–450)
PLATELET # BLD AUTO: 231 10E9/L (ref 150–450)
PLATELET # BLD AUTO: 238 10E9/L (ref 150–450)
PLATELET # BLD AUTO: 245 10E9/L (ref 150–450)
POTASSIUM SERPL-SCNC: 3.7 MMOL/L (ref 3.4–5.3)
POTASSIUM SERPL-SCNC: 3.8 MMOL/L (ref 3.4–5.3)
POTASSIUM SERPL-SCNC: 3.9 MMOL/L (ref 3.4–5.3)
POTASSIUM SERPL-SCNC: 4 MMOL/L (ref 3.4–5.3)
POTASSIUM SERPL-SCNC: 4.1 MMOL/L (ref 3.4–5.3)
POTASSIUM SERPL-SCNC: 4.4 MMOL/L (ref 3.4–5.3)
POTASSIUM SERPL-SCNC: 4.5 MMOL/L (ref 3.4–5.3)
POTASSIUM SERPL-SCNC: 4.5 MMOL/L (ref 3.4–5.3)
POTASSIUM SERPL-SCNC: 4.7 MMOL/L (ref 3.4–5.3)
POTASSIUM SERPL-SCNC: 4.8 MMOL/L (ref 3.4–5.3)
POTASSIUM SERPL-SCNC: 4.9 MMOL/L (ref 3.4–5.3)
POTASSIUM SERPL-SCNC: 5 MMOL/L (ref 3.4–5.3)
PROT SERPL-MCNC: 6.3 G/DL (ref 6.8–8.8)
PROT SERPL-MCNC: 6.7 G/DL (ref 6.8–8.8)
PROT SERPL-MCNC: 6.8 G/DL (ref 6.8–8.8)
PROT SERPL-MCNC: 6.9 G/DL (ref 6.8–8.8)
PSA SERPL-ACNC: 3.5 UG/L (ref 0–4)
PSA SERPL-MCNC: 2.81 UG/L (ref 0–4)
RBC # BLD AUTO: 2.11 10E12/L (ref 4.4–5.9)
RBC # BLD AUTO: 2.24 10E12/L (ref 4.4–5.9)
RBC # BLD AUTO: 2.45 10E12/L (ref 4.4–5.9)
RBC # BLD AUTO: 2.59 10E12/L (ref 4.4–5.9)
RBC # BLD AUTO: 2.6 10E12/L (ref 4.4–5.9)
RBC # BLD AUTO: 2.64 10E12/L (ref 4.4–5.9)
RBC # BLD AUTO: 2.71 10E12/L (ref 4.4–5.9)
RBC # BLD AUTO: 2.72 10E12/L (ref 4.4–5.9)
RBC # BLD AUTO: 2.73 10E12/L (ref 4.4–5.9)
RBC # BLD AUTO: 2.93 10E12/L (ref 4.4–5.9)
RBC # BLD AUTO: 2.98 10E12/L (ref 4.4–5.9)
RBC # BLD AUTO: 3.05 10E12/L (ref 4.4–5.9)
RBC # BLD AUTO: 3.14 10E12/L (ref 4.4–5.9)
RBC # BLD AUTO: 3.23 10E12/L (ref 4.4–5.9)
RBC #/AREA URNS AUTO: >100 /HPF
RBC #/AREA URNS AUTO: >182 /HPF (ref 0–2)
RBC #/AREA URNS AUTO: ABNORMAL /HPF
SODIUM SERPL-SCNC: 127 MMOL/L (ref 133–144)
SODIUM SERPL-SCNC: 132 MMOL/L (ref 133–144)
SODIUM SERPL-SCNC: 133 MMOL/L (ref 133–144)
SODIUM SERPL-SCNC: 134 MMOL/L (ref 133–144)
SODIUM SERPL-SCNC: 134 MMOL/L (ref 133–144)
SODIUM SERPL-SCNC: 135 MMOL/L (ref 133–144)
SODIUM SERPL-SCNC: 135 MMOL/L (ref 133–144)
SODIUM SERPL-SCNC: 136 MMOL/L (ref 133–144)
SODIUM SERPL-SCNC: 137 MMOL/L (ref 133–144)
SODIUM SERPL-SCNC: 139 MMOL/L (ref 133–144)
SODIUM SERPL-SCNC: 143 MMOL/L (ref 133–144)
SODIUM SERPL-SCNC: 143 MMOL/L (ref 133–144)
SOURCE: ABNORMAL
SP GR UR STRIP: 1.01 (ref 1–1.03)
SPECIMEN EXP DATE BLD: NORMAL
SPECIMEN SOURCE: ABNORMAL
SPECIMEN SOURCE: NORMAL
TIBC SERPL-MCNC: 198 UG/DL (ref 240–430)
TRANSFUSION STATUS PATIENT QL: NORMAL
TRIGL SERPL-MCNC: 77 MG/DL
TROPONIN I SERPL-MCNC: <0.015 UG/L (ref 0–0.04)
TROPONIN I SERPL-MCNC: <0.015 UG/L (ref 0–0.04)
UROBILINOGEN UR STRIP-ACNC: 0.2 EU/DL (ref 0.2–1)
UROBILINOGEN UR STRIP-ACNC: 0.2 EU/DL (ref 0.2–1)
UROBILINOGEN UR STRIP-MCNC: 0 MG/DL (ref 0–2)
WBC # BLD AUTO: 10.4 10E9/L (ref 4–11)
WBC # BLD AUTO: 8.1 10E9/L (ref 4–11)
WBC # BLD AUTO: 8.3 10E9/L (ref 4–11)
WBC # BLD AUTO: 8.3 10E9/L (ref 4–11)
WBC # BLD AUTO: 8.4 10E9/L (ref 4–11)
WBC # BLD AUTO: 8.6 10E9/L (ref 4–11)
WBC # BLD AUTO: 8.8 10E9/L (ref 4–11)
WBC # BLD AUTO: 8.8 10E9/L (ref 4–11)
WBC # BLD AUTO: 8.9 10E9/L (ref 4–11)
WBC # BLD AUTO: 9 10E9/L (ref 4–11)
WBC # BLD AUTO: 9 10E9/L (ref 4–11)
WBC # BLD AUTO: 9.1 10E9/L (ref 4–11)
WBC # BLD AUTO: 9.2 10E9/L (ref 4–11)
WBC # BLD AUTO: 9.5 10E9/L (ref 4–11)
WBC #/AREA URNS AUTO: 24 /HPF (ref 0–5)
WBC #/AREA URNS AUTO: >182 /HPF (ref 0–5)
WBC #/AREA URNS AUTO: ABNORMAL /HPF
WBC #/AREA URNS AUTO: ABNORMAL /HPF
WBC CLUMPS #/AREA URNS HPF: PRESENT /HPF

## 2019-01-01 PROCEDURE — 88341 IMHCHEM/IMCYTCHM EA ADD ANTB: CPT | Performed by: UROLOGY

## 2019-01-01 PROCEDURE — 99221 1ST HOSP IP/OBS SF/LOW 40: CPT | Performed by: NURSE PRACTITIONER

## 2019-01-01 PROCEDURE — 25000128 H RX IP 250 OP 636: Performed by: INTERNAL MEDICINE

## 2019-01-01 PROCEDURE — 25000132 ZZH RX MED GY IP 250 OP 250 PS 637: Mod: GY | Performed by: PHYSICIAN ASSISTANT

## 2019-01-01 PROCEDURE — 82728 ASSAY OF FERRITIN: CPT | Performed by: FAMILY MEDICINE

## 2019-01-01 PROCEDURE — 87086 URINE CULTURE/COLONY COUNT: CPT | Performed by: UROLOGY

## 2019-01-01 PROCEDURE — G0378 HOSPITAL OBSERVATION PER HR: HCPCS

## 2019-01-01 PROCEDURE — 85018 HEMOGLOBIN: CPT | Performed by: PHYSICIAN ASSISTANT

## 2019-01-01 PROCEDURE — 81001 URINALYSIS AUTO W/SCOPE: CPT | Performed by: FAMILY MEDICINE

## 2019-01-01 PROCEDURE — 87086 URINE CULTURE/COLONY COUNT: CPT | Performed by: INTERNAL MEDICINE

## 2019-01-01 PROCEDURE — 88341 IMHCHEM/IMCYTCHM EA ADD ANTB: CPT | Mod: 26 | Performed by: UROLOGY

## 2019-01-01 PROCEDURE — 99223 1ST HOSP IP/OBS HIGH 75: CPT | Mod: AI | Performed by: PHYSICIAN ASSISTANT

## 2019-01-01 PROCEDURE — 96376 TX/PRO/DX INJ SAME DRUG ADON: CPT

## 2019-01-01 PROCEDURE — 86923 COMPATIBILITY TEST ELECTRIC: CPT | Performed by: FAMILY MEDICINE

## 2019-01-01 PROCEDURE — 87186 SC STD MICRODIL/AGAR DIL: CPT | Performed by: INTERNAL MEDICINE

## 2019-01-01 PROCEDURE — 36415 COLL VENOUS BLD VENIPUNCTURE: CPT | Performed by: PHYSICIAN ASSISTANT

## 2019-01-01 PROCEDURE — 12000000 ZZH R&B MED SURG/OB

## 2019-01-01 PROCEDURE — 99220 ZZC INITIAL OBSERVATION CARE,LEVL III: CPT | Performed by: PHYSICIAN ASSISTANT

## 2019-01-01 PROCEDURE — 80048 BASIC METABOLIC PNL TOTAL CA: CPT | Performed by: REGISTERED NURSE

## 2019-01-01 PROCEDURE — 99309 SBSQ NF CARE MODERATE MDM 30: CPT | Mod: GV | Performed by: NURSE PRACTITIONER

## 2019-01-01 PROCEDURE — 83540 ASSAY OF IRON: CPT | Performed by: FAMILY MEDICINE

## 2019-01-01 PROCEDURE — P9016 RBC LEUKOCYTES REDUCED: HCPCS

## 2019-01-01 PROCEDURE — 99285 EMERGENCY DEPT VISIT HI MDM: CPT | Mod: 25 | Performed by: FAMILY MEDICINE

## 2019-01-01 PROCEDURE — 99202 OFFICE O/P NEW SF 15 MIN: CPT | Mod: 25 | Performed by: UROLOGY

## 2019-01-01 PROCEDURE — 85027 COMPLETE CBC AUTOMATED: CPT | Performed by: PHYSICIAN ASSISTANT

## 2019-01-01 PROCEDURE — 80053 COMPREHEN METABOLIC PANEL: CPT | Performed by: FAMILY MEDICINE

## 2019-01-01 PROCEDURE — 99207 ZZC CDG-CODE CATEGORY CHANGED: CPT | Performed by: INTERNAL MEDICINE

## 2019-01-01 PROCEDURE — 86900 BLOOD TYPING SEROLOGIC ABO: CPT | Performed by: FAMILY MEDICINE

## 2019-01-01 PROCEDURE — 87186 SC STD MICRODIL/AGAR DIL: CPT | Performed by: UROLOGY

## 2019-01-01 PROCEDURE — 99310 SBSQ NF CARE HIGH MDM 45: CPT | Performed by: NURSE PRACTITIONER

## 2019-01-01 PROCEDURE — 30012450 ZZH INTERCOMPANY SERVICE, LAB 300 OPNP

## 2019-01-01 PROCEDURE — 85027 COMPLETE CBC AUTOMATED: CPT | Performed by: REGISTERED NURSE

## 2019-01-01 PROCEDURE — 99397 PER PM REEVAL EST PAT 65+ YR: CPT | Performed by: FAMILY MEDICINE

## 2019-01-01 PROCEDURE — G0103 PSA SCREENING: HCPCS | Performed by: FAMILY MEDICINE

## 2019-01-01 PROCEDURE — 25500064 ZZH RX 255 OP 636

## 2019-01-01 PROCEDURE — 99214 OFFICE O/P EST MOD 30 MIN: CPT | Mod: 25 | Performed by: UROLOGY

## 2019-01-01 PROCEDURE — 99283 EMERGENCY DEPT VISIT LOW MDM: CPT

## 2019-01-01 PROCEDURE — 80048 BASIC METABOLIC PNL TOTAL CA: CPT

## 2019-01-01 PROCEDURE — 76705 ECHO EXAM OF ABDOMEN: CPT | Performed by: FAMILY MEDICINE

## 2019-01-01 PROCEDURE — 88305 TISSUE EXAM BY PATHOLOGIST: CPT | Mod: 26 | Performed by: UROLOGY

## 2019-01-01 PROCEDURE — 36415 COLL VENOUS BLD VENIPUNCTURE: CPT

## 2019-01-01 PROCEDURE — 86900 BLOOD TYPING SEROLOGIC ABO: CPT

## 2019-01-01 PROCEDURE — 99284 EMERGENCY DEPT VISIT MOD MDM: CPT | Mod: 25 | Performed by: FAMILY MEDICINE

## 2019-01-01 PROCEDURE — 86923 COMPATIBILITY TEST ELECTRIC: CPT | Performed by: PHYSICIAN ASSISTANT

## 2019-01-01 PROCEDURE — 83880 ASSAY OF NATRIURETIC PEPTIDE: CPT | Performed by: FAMILY MEDICINE

## 2019-01-01 PROCEDURE — 99285 EMERGENCY DEPT VISIT HI MDM: CPT | Mod: Z6 | Performed by: FAMILY MEDICINE

## 2019-01-01 PROCEDURE — 86900 BLOOD TYPING SEROLOGIC ABO: CPT | Performed by: INTERNAL MEDICINE

## 2019-01-01 PROCEDURE — 84484 ASSAY OF TROPONIN QUANT: CPT | Performed by: FAMILY MEDICINE

## 2019-01-01 PROCEDURE — 88342 IMHCHEM/IMCYTCHM 1ST ANTB: CPT | Mod: 26 | Performed by: UROLOGY

## 2019-01-01 PROCEDURE — 36415 COLL VENOUS BLD VENIPUNCTURE: CPT | Performed by: INTERNAL MEDICINE

## 2019-01-01 PROCEDURE — G0008 ADMIN INFLUENZA VIRUS VAC: HCPCS

## 2019-01-01 PROCEDURE — 81001 URINALYSIS AUTO W/SCOPE: CPT | Performed by: UROLOGY

## 2019-01-01 PROCEDURE — 25000128 H RX IP 250 OP 636: Performed by: EMERGENCY MEDICINE

## 2019-01-01 PROCEDURE — 86850 RBC ANTIBODY SCREEN: CPT

## 2019-01-01 PROCEDURE — 25000132 ZZH RX MED GY IP 250 OP 250 PS 637: Mod: GY | Performed by: INTERNAL MEDICINE

## 2019-01-01 PROCEDURE — 99220 ZZC INITIAL OBSERVATION CARE,LEVL III: CPT | Performed by: INTERNAL MEDICINE

## 2019-01-01 PROCEDURE — 85025 COMPLETE CBC W/AUTO DIFF WBC: CPT | Performed by: FAMILY MEDICINE

## 2019-01-01 PROCEDURE — 97161 PT EVAL LOW COMPLEX 20 MIN: CPT | Mod: GP | Performed by: PHYSICAL THERAPIST

## 2019-01-01 PROCEDURE — 55700 HC BIOPSY PROSTATE NEEDLE/PUNCH: CPT | Performed by: UROLOGY

## 2019-01-01 PROCEDURE — 25000132 ZZH RX MED GY IP 250 OP 250 PS 637: Mod: GY | Performed by: NURSE PRACTITIONER

## 2019-01-01 PROCEDURE — 51798 US URINE CAPACITY MEASURE: CPT | Performed by: UROLOGY

## 2019-01-01 PROCEDURE — 85027 COMPLETE CBC AUTOMATED: CPT

## 2019-01-01 PROCEDURE — 86850 RBC ANTIBODY SCREEN: CPT | Performed by: FAMILY MEDICINE

## 2019-01-01 PROCEDURE — 96361 HYDRATE IV INFUSION ADD-ON: CPT | Performed by: FAMILY MEDICINE

## 2019-01-01 PROCEDURE — 99207 ZZC CDG-HISTORY COMP: MEETS EXP. PROBLEM FOCUSED-DOWN CODED LACK OF ROS: CPT | Performed by: NURSE PRACTITIONER

## 2019-01-01 PROCEDURE — 99203 OFFICE O/P NEW LOW 30 MIN: CPT | Performed by: SURGERY

## 2019-01-01 PROCEDURE — 83605 ASSAY OF LACTIC ACID: CPT | Performed by: FAMILY MEDICINE

## 2019-01-01 PROCEDURE — 25800030 ZZH RX IP 258 OP 636: Performed by: FAMILY MEDICINE

## 2019-01-01 PROCEDURE — 99309 SBSQ NF CARE MODERATE MDM 30: CPT | Performed by: NURSE PRACTITIONER

## 2019-01-01 PROCEDURE — 87088 URINE BACTERIA CULTURE: CPT | Performed by: INTERNAL MEDICINE

## 2019-01-01 PROCEDURE — 74178 CT ABD&PLV WO CNTR FLWD CNTR: CPT

## 2019-01-01 PROCEDURE — 86923 COMPATIBILITY TEST ELECTRIC: CPT

## 2019-01-01 PROCEDURE — 86901 BLOOD TYPING SEROLOGIC RH(D): CPT

## 2019-01-01 PROCEDURE — 25000132 ZZH RX MED GY IP 250 OP 250 PS 637: Mod: GY

## 2019-01-01 PROCEDURE — 99214 OFFICE O/P EST MOD 30 MIN: CPT | Performed by: FAMILY MEDICINE

## 2019-01-01 PROCEDURE — 96360 HYDRATION IV INFUSION INIT: CPT | Performed by: FAMILY MEDICINE

## 2019-01-01 PROCEDURE — 36430 TRANSFUSION BLD/BLD COMPNT: CPT

## 2019-01-01 PROCEDURE — 82565 ASSAY OF CREATININE: CPT

## 2019-01-01 PROCEDURE — P9016 RBC LEUKOCYTES REDUCED: HCPCS | Performed by: PHYSICIAN ASSISTANT

## 2019-01-01 PROCEDURE — 71275 CT ANGIOGRAPHY CHEST: CPT

## 2019-01-01 PROCEDURE — 99231 SBSQ HOSP IP/OBS SF/LOW 25: CPT | Performed by: NURSE PRACTITIONER

## 2019-01-01 PROCEDURE — 99213 OFFICE O/P EST LOW 20 MIN: CPT | Performed by: UROLOGY

## 2019-01-01 PROCEDURE — 99239 HOSP IP/OBS DSCHRG MGMT >30: CPT

## 2019-01-01 PROCEDURE — 25000128 H RX IP 250 OP 636: Performed by: RADIOLOGY

## 2019-01-01 PROCEDURE — 25000128 H RX IP 250 OP 636: Performed by: FAMILY MEDICINE

## 2019-01-01 PROCEDURE — 99207 ZZC CDG-CODE CATEGORY CHANGED: CPT | Performed by: PHYSICIAN ASSISTANT

## 2019-01-01 PROCEDURE — 93005 ELECTROCARDIOGRAM TRACING: CPT | Performed by: FAMILY MEDICINE

## 2019-01-01 PROCEDURE — 87086 URINE CULTURE/COLONY COUNT: CPT | Performed by: FAMILY MEDICINE

## 2019-01-01 PROCEDURE — 99305 1ST NF CARE MODERATE MDM 35: CPT | Performed by: INTERNAL MEDICINE

## 2019-01-01 PROCEDURE — 99233 SBSQ HOSP IP/OBS HIGH 50: CPT

## 2019-01-01 PROCEDURE — 93306 TTE W/DOPPLER COMPLETE: CPT | Mod: 26 | Performed by: INTERNAL MEDICINE

## 2019-01-01 PROCEDURE — 96361 HYDRATE IV INFUSION ADD-ON: CPT

## 2019-01-01 PROCEDURE — 40000264 ECHOCARDIOGRAM COMPLETE

## 2019-01-01 PROCEDURE — 36415 COLL VENOUS BLD VENIPUNCTURE: CPT | Mod: XP | Performed by: INTERNAL MEDICINE

## 2019-01-01 PROCEDURE — 88342 IMHCHEM/IMCYTCHM 1ST ANTB: CPT | Performed by: UROLOGY

## 2019-01-01 PROCEDURE — 85610 PROTHROMBIN TIME: CPT | Performed by: FAMILY MEDICINE

## 2019-01-01 PROCEDURE — 86901 BLOOD TYPING SEROLOGIC RH(D): CPT | Performed by: FAMILY MEDICINE

## 2019-01-01 PROCEDURE — 82728 ASSAY OF FERRITIN: CPT | Performed by: INTERNAL MEDICINE

## 2019-01-01 PROCEDURE — 85025 COMPLETE CBC W/AUTO DIFF WBC: CPT | Performed by: EMERGENCY MEDICINE

## 2019-01-01 PROCEDURE — 80048 BASIC METABOLIC PNL TOTAL CA: CPT | Performed by: PHYSICIAN ASSISTANT

## 2019-01-01 PROCEDURE — 80048 BASIC METABOLIC PNL TOTAL CA: CPT | Performed by: FAMILY MEDICINE

## 2019-01-01 PROCEDURE — 85018 HEMOGLOBIN: CPT | Mod: 91 | Performed by: INTERNAL MEDICINE

## 2019-01-01 PROCEDURE — 25000125 ZZHC RX 250: Performed by: PHYSICIAN ASSISTANT

## 2019-01-01 PROCEDURE — 97116 GAIT TRAINING THERAPY: CPT | Mod: GP | Performed by: PHYSICAL THERAPIST

## 2019-01-01 PROCEDURE — 96365 THER/PROPH/DIAG IV INF INIT: CPT | Performed by: FAMILY MEDICINE

## 2019-01-01 PROCEDURE — 93010 ELECTROCARDIOGRAM REPORT: CPT | Mod: Z6 | Performed by: FAMILY MEDICINE

## 2019-01-01 PROCEDURE — 86900 BLOOD TYPING SEROLOGIC ABO: CPT | Performed by: PHYSICIAN ASSISTANT

## 2019-01-01 PROCEDURE — G0463 HOSPITAL OUTPT CLINIC VISIT: HCPCS | Mod: 25

## 2019-01-01 PROCEDURE — 87086 URINE CULTURE/COLONY COUNT: CPT | Performed by: EMERGENCY MEDICINE

## 2019-01-01 PROCEDURE — 90662 IIV NO PRSV INCREASED AG IM: CPT | Performed by: INTERNAL MEDICINE

## 2019-01-01 PROCEDURE — 99284 EMERGENCY DEPT VISIT MOD MDM: CPT | Mod: 25

## 2019-01-01 PROCEDURE — 76705 ECHO EXAM OF ABDOMEN: CPT | Mod: 26 | Performed by: FAMILY MEDICINE

## 2019-01-01 PROCEDURE — 76872 US TRANSRECTAL: CPT | Performed by: UROLOGY

## 2019-01-01 PROCEDURE — 80048 BASIC METABOLIC PNL TOTAL CA: CPT | Performed by: EMERGENCY MEDICINE

## 2019-01-01 PROCEDURE — 25000125 ZZHC RX 250: Performed by: RADIOLOGY

## 2019-01-01 PROCEDURE — 36415 COLL VENOUS BLD VENIPUNCTURE: CPT | Performed by: REGISTERED NURSE

## 2019-01-01 PROCEDURE — 52000 CYSTOURETHROSCOPY: CPT | Performed by: UROLOGY

## 2019-01-01 PROCEDURE — 86901 BLOOD TYPING SEROLOGIC RH(D): CPT | Performed by: PHYSICIAN ASSISTANT

## 2019-01-01 PROCEDURE — 25000125 ZZHC RX 250: Performed by: NURSE PRACTITIONER

## 2019-01-01 PROCEDURE — 99217 ZZC OBSERVATION CARE DISCHARGE: CPT | Performed by: INTERNAL MEDICINE

## 2019-01-01 PROCEDURE — 99205 OFFICE O/P NEW HI 60 MIN: CPT | Performed by: INTERNAL MEDICINE

## 2019-01-01 PROCEDURE — 99214 OFFICE O/P EST MOD 30 MIN: CPT | Performed by: INTERNAL MEDICINE

## 2019-01-01 PROCEDURE — 87088 URINE BACTERIA CULTURE: CPT | Performed by: UROLOGY

## 2019-01-01 PROCEDURE — 52000 CYSTOURETHROSCOPY: CPT | Mod: 53 | Performed by: UROLOGY

## 2019-01-01 PROCEDURE — 84153 ASSAY OF PSA TOTAL: CPT

## 2019-01-01 PROCEDURE — 83550 IRON BINDING TEST: CPT | Performed by: FAMILY MEDICINE

## 2019-01-01 PROCEDURE — 97165 OT EVAL LOW COMPLEX 30 MIN: CPT | Mod: GO

## 2019-01-01 PROCEDURE — 99213 OFFICE O/P EST LOW 20 MIN: CPT | Mod: 25 | Performed by: UROLOGY

## 2019-01-01 PROCEDURE — 51702 INSERT TEMP BLADDER CATH: CPT | Performed by: UROLOGY

## 2019-01-01 PROCEDURE — 96365 THER/PROPH/DIAG IV INF INIT: CPT

## 2019-01-01 PROCEDURE — 25000132 ZZH RX MED GY IP 250 OP 250 PS 637: Mod: GY | Performed by: EMERGENCY MEDICINE

## 2019-01-01 PROCEDURE — 0VB03ZX EXCISION OF PROSTATE, PERCUTANEOUS APPROACH, DIAGNOSTIC: ICD-10-PCS | Performed by: UROLOGY

## 2019-01-01 PROCEDURE — 86901 BLOOD TYPING SEROLOGIC RH(D): CPT | Performed by: INTERNAL MEDICINE

## 2019-01-01 PROCEDURE — 86850 RBC ANTIBODY SCREEN: CPT | Performed by: PHYSICIAN ASSISTANT

## 2019-01-01 PROCEDURE — 86923 COMPATIBILITY TEST ELECTRIC: CPT | Performed by: INTERNAL MEDICINE

## 2019-01-01 PROCEDURE — 36415 COLL VENOUS BLD VENIPUNCTURE: CPT | Performed by: FAMILY MEDICINE

## 2019-01-01 PROCEDURE — 51702 INSERT TEMP BLADDER CATH: CPT

## 2019-01-01 PROCEDURE — 25000125 ZZHC RX 250: Performed by: FAMILY MEDICINE

## 2019-01-01 PROCEDURE — 99213 OFFICE O/P EST LOW 20 MIN: CPT | Performed by: FAMILY MEDICINE

## 2019-01-01 PROCEDURE — 97535 SELF CARE MNGMENT TRAINING: CPT | Mod: GO

## 2019-01-01 PROCEDURE — 80061 LIPID PANEL: CPT | Performed by: FAMILY MEDICINE

## 2019-01-01 PROCEDURE — 99283 EMERGENCY DEPT VISIT LOW MDM: CPT | Mod: Z6 | Performed by: FAMILY MEDICINE

## 2019-01-01 PROCEDURE — 85018 HEMOGLOBIN: CPT | Performed by: INTERNAL MEDICINE

## 2019-01-01 PROCEDURE — 88305 TISSUE EXAM BY PATHOLOGIST: CPT | Performed by: UROLOGY

## 2019-01-01 PROCEDURE — 99284 EMERGENCY DEPT VISIT MOD MDM: CPT | Mod: Z6 | Performed by: EMERGENCY MEDICINE

## 2019-01-01 PROCEDURE — 86850 RBC ANTIBODY SCREEN: CPT | Performed by: INTERNAL MEDICINE

## 2019-01-01 RX ORDER — ALBUTEROL SULFATE 0.83 MG/ML
2.5 SOLUTION RESPIRATORY (INHALATION)
Status: CANCELLED | OUTPATIENT
Start: 2019-01-01

## 2019-01-01 RX ORDER — MEPERIDINE HYDROCHLORIDE 25 MG/ML
25 INJECTION INTRAMUSCULAR; INTRAVENOUS; SUBCUTANEOUS EVERY 30 MIN PRN
Status: CANCELLED | OUTPATIENT
Start: 2019-01-01

## 2019-01-01 RX ORDER — OXYCODONE HYDROCHLORIDE 5 MG/1
5 TABLET ORAL EVERY 4 HOURS PRN
Status: DISCONTINUED | OUTPATIENT
Start: 2019-01-01 | End: 2019-01-01 | Stop reason: HOSPADM

## 2019-01-01 RX ORDER — NALOXONE HYDROCHLORIDE 0.4 MG/ML
.1-.4 INJECTION, SOLUTION INTRAMUSCULAR; INTRAVENOUS; SUBCUTANEOUS
Status: DISCONTINUED | OUTPATIENT
Start: 2019-01-01 | End: 2019-01-01 | Stop reason: HOSPADM

## 2019-01-01 RX ORDER — LORAZEPAM 0.5 MG/1
0.5 TABLET ORAL 2 TIMES DAILY PRN
Status: DISCONTINUED | OUTPATIENT
Start: 2019-01-01 | End: 2019-01-01 | Stop reason: HOSPADM

## 2019-01-01 RX ORDER — METHYLPHENIDATE HYDROCHLORIDE 10 MG/1
10 TABLET ORAL 2 TIMES DAILY
Qty: 14 TABLET | Refills: 0 | Status: SHIPPED | OUTPATIENT
Start: 2019-01-01 | End: 2019-01-01

## 2019-01-01 RX ORDER — ALBUTEROL SULFATE 90 UG/1
1-2 AEROSOL, METERED RESPIRATORY (INHALATION)
Status: CANCELLED
Start: 2019-01-01

## 2019-01-01 RX ORDER — ONDANSETRON 2 MG/ML
4 INJECTION INTRAMUSCULAR; INTRAVENOUS EVERY 6 HOURS PRN
Status: DISCONTINUED | OUTPATIENT
Start: 2019-01-01 | End: 2019-01-01 | Stop reason: HOSPADM

## 2019-01-01 RX ORDER — FINASTERIDE 5 MG/1
5 TABLET, FILM COATED ORAL DAILY
Refills: 3 | COMMUNITY
Start: 2019-01-01 | End: 2019-01-01

## 2019-01-01 RX ORDER — SIMVASTATIN 20 MG
20 TABLET ORAL AT BEDTIME
Qty: 90 TABLET | Refills: 3 | Status: SHIPPED | OUTPATIENT
Start: 2019-01-01 | End: 2019-01-01

## 2019-01-01 RX ORDER — AMLODIPINE BESYLATE 10 MG/1
10 TABLET ORAL EVERY MORNING
Status: DISCONTINUED | OUTPATIENT
Start: 2019-01-01 | End: 2019-01-01

## 2019-01-01 RX ORDER — LOSARTAN POTASSIUM 50 MG/1
100 TABLET ORAL DAILY
Status: DISCONTINUED | OUTPATIENT
Start: 2019-01-01 | End: 2019-01-01 | Stop reason: HOSPADM

## 2019-01-01 RX ORDER — MINERAL OIL 100 G/100G
1 OIL RECTAL ONCE
Status: ON HOLD | COMMUNITY
End: 2019-01-01

## 2019-01-01 RX ORDER — NALOXONE HYDROCHLORIDE 0.4 MG/ML
.1-.4 INJECTION, SOLUTION INTRAMUSCULAR; INTRAVENOUS; SUBCUTANEOUS
Status: CANCELLED | OUTPATIENT
Start: 2019-01-01

## 2019-01-01 RX ORDER — CIPROFLOXACIN 500 MG/1
500 TABLET, FILM COATED ORAL 2 TIMES DAILY
Refills: 0 | COMMUNITY
Start: 2019-01-01 | End: 2019-01-01

## 2019-01-01 RX ORDER — SODIUM CHLORIDE 9 MG/ML
1000 INJECTION, SOLUTION INTRAVENOUS CONTINUOUS PRN
Status: CANCELLED
Start: 2019-01-01

## 2019-01-01 RX ORDER — TAMSULOSIN HYDROCHLORIDE 0.4 MG/1
0.4 CAPSULE ORAL DAILY
Status: DISCONTINUED | OUTPATIENT
Start: 2019-01-01 | End: 2019-01-01 | Stop reason: HOSPADM

## 2019-01-01 RX ORDER — LORAZEPAM 2 MG/ML
0.5 INJECTION INTRAMUSCULAR EVERY 4 HOURS PRN
Status: CANCELLED
Start: 2019-01-01

## 2019-01-01 RX ORDER — METHYLPREDNISOLONE SODIUM SUCCINATE 125 MG/2ML
125 INJECTION, POWDER, LYOPHILIZED, FOR SOLUTION INTRAMUSCULAR; INTRAVENOUS
Status: CANCELLED
Start: 2019-01-01

## 2019-01-01 RX ORDER — CEPHALEXIN 500 MG/1
500 CAPSULE ORAL 3 TIMES DAILY
DISCHARGE
Start: 2019-01-01

## 2019-01-01 RX ORDER — FUROSEMIDE 10 MG/ML
20 INJECTION INTRAMUSCULAR; INTRAVENOUS ONCE
Status: CANCELLED
Start: 2019-01-01

## 2019-01-01 RX ORDER — PROCHLORPERAZINE 25 MG
12.5 SUPPOSITORY, RECTAL RECTAL EVERY 12 HOURS PRN
Status: DISCONTINUED | OUTPATIENT
Start: 2019-01-01 | End: 2019-01-01 | Stop reason: HOSPADM

## 2019-01-01 RX ORDER — EPINEPHRINE 0.3 MG/.3ML
0.3 INJECTION SUBCUTANEOUS EVERY 5 MIN PRN
Status: CANCELLED | OUTPATIENT
Start: 2019-01-01

## 2019-01-01 RX ORDER — EPINEPHRINE 1 MG/ML
0.3 INJECTION, SOLUTION, CONCENTRATE INTRAVENOUS EVERY 5 MIN PRN
Status: CANCELLED | OUTPATIENT
Start: 2019-01-01

## 2019-01-01 RX ORDER — AMLODIPINE BESYLATE 10 MG/1
10 TABLET ORAL DAILY
Qty: 90 TABLET | Refills: 3 | Status: ON HOLD | OUTPATIENT
Start: 2019-01-01 | End: 2019-01-01

## 2019-01-01 RX ORDER — LOSARTAN POTASSIUM 50 MG/1
100 TABLET ORAL EVERY MORNING
Status: DISCONTINUED | OUTPATIENT
Start: 2019-01-01 | End: 2019-01-01

## 2019-01-01 RX ORDER — AMOXICILLIN 250 MG
1 CAPSULE ORAL AT BEDTIME
Status: DISCONTINUED | OUTPATIENT
Start: 2019-01-01 | End: 2019-01-01 | Stop reason: HOSPADM

## 2019-01-01 RX ORDER — TAMSULOSIN HYDROCHLORIDE 0.4 MG/1
0.4 CAPSULE ORAL DAILY
COMMUNITY
End: 2019-01-01

## 2019-01-01 RX ORDER — PROCHLORPERAZINE MALEATE 5 MG
5 TABLET ORAL EVERY 6 HOURS PRN
Status: DISCONTINUED | OUTPATIENT
Start: 2019-01-01 | End: 2019-01-01 | Stop reason: HOSPADM

## 2019-01-01 RX ORDER — AMLODIPINE BESYLATE 10 MG/1
10 TABLET ORAL DAILY
Status: DISCONTINUED | OUTPATIENT
Start: 2019-01-01 | End: 2019-01-01 | Stop reason: HOSPADM

## 2019-01-01 RX ORDER — HYDROMORPHONE HYDROCHLORIDE 1 MG/ML
.3-.5 INJECTION, SOLUTION INTRAMUSCULAR; INTRAVENOUS; SUBCUTANEOUS
Status: DISCONTINUED | OUTPATIENT
Start: 2019-01-01 | End: 2019-01-01 | Stop reason: HOSPADM

## 2019-01-01 RX ORDER — IOPAMIDOL 755 MG/ML
100 INJECTION, SOLUTION INTRAVASCULAR ONCE
Status: COMPLETED | OUTPATIENT
Start: 2019-01-01 | End: 2019-01-01

## 2019-01-01 RX ORDER — IOPAMIDOL 755 MG/ML
68 INJECTION, SOLUTION INTRAVASCULAR ONCE
Status: COMPLETED | OUTPATIENT
Start: 2019-01-01 | End: 2019-01-01

## 2019-01-01 RX ORDER — TAMSULOSIN HYDROCHLORIDE 0.4 MG/1
0.4 CAPSULE ORAL EVERY EVENING
Status: DISCONTINUED | OUTPATIENT
Start: 2019-01-01 | End: 2019-01-01 | Stop reason: HOSPADM

## 2019-01-01 RX ORDER — ACETAMINOPHEN 500 MG
1000 TABLET ORAL 3 TIMES DAILY
COMMUNITY

## 2019-01-01 RX ORDER — TAMSULOSIN HYDROCHLORIDE 0.4 MG/1
0.4 CAPSULE ORAL DAILY
Qty: 90 CAPSULE | Refills: 3 | Status: SHIPPED | OUTPATIENT
Start: 2019-01-01

## 2019-01-01 RX ORDER — POLYETHYLENE GLYCOL 3350 17 G/17G
1 POWDER, FOR SOLUTION ORAL DAILY PRN
COMMUNITY

## 2019-01-01 RX ORDER — LOSARTAN POTASSIUM 100 MG/1
100 TABLET ORAL DAILY
Qty: 90 TABLET | Refills: 3 | Status: ON HOLD | OUTPATIENT
Start: 2019-01-01 | End: 2019-01-01

## 2019-01-01 RX ORDER — TAMSULOSIN HYDROCHLORIDE 0.4 MG/1
0.4 CAPSULE ORAL DAILY
Qty: 90 CAPSULE | Refills: 3 | Status: SHIPPED | OUTPATIENT
Start: 2019-01-01 | End: 2019-01-01

## 2019-01-01 RX ORDER — OXYCODONE HYDROCHLORIDE 5 MG/1
5 TABLET ORAL EVERY 4 HOURS PRN
Qty: 20 TABLET | Refills: 0 | Status: SHIPPED | OUTPATIENT
Start: 2019-01-01

## 2019-01-01 RX ORDER — ACETAMINOPHEN 325 MG/1
650 TABLET ORAL EVERY 4 HOURS PRN
Status: DISCONTINUED | OUTPATIENT
Start: 2019-01-01 | End: 2019-01-01 | Stop reason: HOSPADM

## 2019-01-01 RX ORDER — METHYLPHENIDATE HYDROCHLORIDE 10 MG/1
10 TABLET ORAL 2 TIMES DAILY
Qty: 10 TABLET | Refills: 0 | Status: ON HOLD | OUTPATIENT
Start: 2019-01-01 | End: 2019-01-01

## 2019-01-01 RX ORDER — AMPICILLIN TRIHYDRATE 500 MG
500 CAPSULE ORAL 3 TIMES DAILY
Status: DISCONTINUED | OUTPATIENT
Start: 2019-01-01 | End: 2019-01-01 | Stop reason: HOSPADM

## 2019-01-01 RX ORDER — ONDANSETRON 4 MG/1
4 TABLET, ORALLY DISINTEGRATING ORAL EVERY 6 HOURS PRN
Status: DISCONTINUED | OUTPATIENT
Start: 2019-01-01 | End: 2019-01-01 | Stop reason: HOSPADM

## 2019-01-01 RX ORDER — DIPHENHYDRAMINE HYDROCHLORIDE 50 MG/ML
50 INJECTION INTRAMUSCULAR; INTRAVENOUS
Status: CANCELLED
Start: 2019-01-01

## 2019-01-01 RX ORDER — LACTOSE-REDUCED FOOD
6 LIQUID (ML) ORAL 3 TIMES DAILY
Status: DISCONTINUED | OUTPATIENT
Start: 2019-01-01 | End: 2019-01-01 | Stop reason: RX

## 2019-01-01 RX ORDER — LIDOCAINE 40 MG/G
CREAM TOPICAL
Status: DISCONTINUED | OUTPATIENT
Start: 2019-01-01 | End: 2019-01-01 | Stop reason: HOSPADM

## 2019-01-01 RX ORDER — SODIUM CHLORIDE, SODIUM LACTATE, POTASSIUM CHLORIDE, CALCIUM CHLORIDE 600; 310; 30; 20 MG/100ML; MG/100ML; MG/100ML; MG/100ML
1000 INJECTION, SOLUTION INTRAVENOUS CONTINUOUS
Status: DISCONTINUED | OUTPATIENT
Start: 2019-01-01 | End: 2019-01-01 | Stop reason: HOSPADM

## 2019-01-01 RX ORDER — CEFTRIAXONE 1 G/1
1 INJECTION, POWDER, FOR SOLUTION INTRAMUSCULAR; INTRAVENOUS ONCE
Status: COMPLETED | OUTPATIENT
Start: 2019-01-01 | End: 2019-01-01

## 2019-01-01 RX ORDER — LIDOCAINE HYDROCHLORIDE 20 MG/ML
JELLY TOPICAL 4 TIMES DAILY PRN
COMMUNITY

## 2019-01-01 RX ORDER — METHYLPHENIDATE HYDROCHLORIDE 5 MG/1
10 TABLET ORAL 2 TIMES DAILY
Status: DISCONTINUED | OUTPATIENT
Start: 2019-01-01 | End: 2019-01-01 | Stop reason: HOSPADM

## 2019-01-01 RX ORDER — LORAZEPAM 0.5 MG/1
0.5 TABLET ORAL 2 TIMES DAILY PRN
Qty: 10 TABLET | Refills: 0 | Status: ON HOLD | OUTPATIENT
Start: 2019-01-01 | End: 2019-01-01

## 2019-01-01 RX ORDER — POLYETHYLENE GLYCOL 3350 17 G/17G
17 POWDER, FOR SOLUTION ORAL DAILY PRN
Status: DISCONTINUED | OUTPATIENT
Start: 2019-01-01 | End: 2019-01-01 | Stop reason: HOSPADM

## 2019-01-01 RX ORDER — SULFAMETHOXAZOLE/TRIMETHOPRIM 800-160 MG
1 TABLET ORAL 2 TIMES DAILY
Qty: 14 TABLET | Refills: 0 | Status: SHIPPED | OUTPATIENT
Start: 2019-01-01 | End: 2019-01-01

## 2019-01-01 RX ORDER — ATROPA BELLADONNA AND OPIUM 16.2; 3 MG/1; MG/1
30 SUPPOSITORY RECTAL EVERY 6 HOURS PRN
Status: DISCONTINUED | OUTPATIENT
Start: 2019-01-01 | End: 2019-01-01 | Stop reason: HOSPADM

## 2019-01-01 RX ORDER — ALLOPURINOL 100 MG/1
100 TABLET ORAL DAILY
Status: DISCONTINUED | OUTPATIENT
Start: 2019-01-01 | End: 2019-01-01 | Stop reason: HOSPADM

## 2019-01-01 RX ORDER — SODIUM CHLORIDE 9 MG/ML
INJECTION, SOLUTION INTRAVENOUS CONTINUOUS
Status: DISCONTINUED | OUTPATIENT
Start: 2019-01-01 | End: 2019-01-01

## 2019-01-01 RX ORDER — AMOXICILLIN 250 MG
1 CAPSULE ORAL AT BEDTIME
COMMUNITY

## 2019-01-01 RX ORDER — SPIRONOLACTONE 25 MG/1
25 TABLET ORAL DAILY
Qty: 30 TABLET | Refills: 0 | Status: SHIPPED | OUTPATIENT
Start: 2019-01-01 | End: 2019-01-01

## 2019-01-01 RX ORDER — METHYLPHENIDATE HYDROCHLORIDE 5 MG/1
5 TABLET ORAL 2 TIMES DAILY
Status: DISCONTINUED | OUTPATIENT
Start: 2019-01-01 | End: 2019-01-01

## 2019-01-01 RX ORDER — OXYCODONE HYDROCHLORIDE 5 MG/1
5 TABLET ORAL
Status: COMPLETED | OUTPATIENT
Start: 2019-01-01 | End: 2019-01-01

## 2019-01-01 RX ORDER — LANOLIN ALCOHOL/MO/W.PET/CERES
3 CREAM (GRAM) TOPICAL
COMMUNITY
Start: 2019-01-01

## 2019-01-01 RX ORDER — CEFTRIAXONE SODIUM 1 G/50ML
1 INJECTION, SOLUTION INTRAVENOUS ONCE
Status: DISCONTINUED | OUTPATIENT
Start: 2019-01-01 | End: 2019-01-01 | Stop reason: CLARIF

## 2019-01-01 RX ORDER — LANOLIN ALCOHOL/MO/W.PET/CERES
1 CREAM (GRAM) TOPICAL
COMMUNITY
End: 2019-01-01

## 2019-01-01 RX ORDER — METHYLPHENIDATE HYDROCHLORIDE 10 MG/1
10 TABLET ORAL 2 TIMES DAILY
Qty: 20 TABLET | Refills: 0 | Status: SHIPPED | OUTPATIENT
Start: 2019-01-01

## 2019-01-01 RX ORDER — ACETAMINOPHEN 500 MG
1000 TABLET ORAL 3 TIMES DAILY
Status: DISCONTINUED | OUTPATIENT
Start: 2019-01-01 | End: 2019-01-01 | Stop reason: HOSPADM

## 2019-01-01 RX ORDER — LACTOSE-REDUCED FOOD
6 LIQUID (ML) ORAL 3 TIMES DAILY
COMMUNITY

## 2019-01-01 RX ORDER — LACTOSE-REDUCED FOOD
6 LIQUID (ML) ORAL 3 TIMES DAILY
Status: DISCONTINUED | OUTPATIENT
Start: 2019-01-01 | End: 2019-01-01 | Stop reason: HOSPADM

## 2019-01-01 RX ORDER — AMPICILLIN TRIHYDRATE 500 MG
500 CAPSULE ORAL 3 TIMES DAILY
Refills: 0 | COMMUNITY
Start: 2019-01-01 | End: 2019-01-01

## 2019-01-01 RX ORDER — OXYCODONE AND ACETAMINOPHEN 5; 325 MG/1; MG/1
1 TABLET ORAL EVERY 6 HOURS PRN
Qty: 20 TABLET | Refills: 0 | Status: ON HOLD | OUTPATIENT
Start: 2019-01-01 | End: 2019-01-01

## 2019-01-01 RX ORDER — CHOLECALCIFEROL (VITAMIN D3) 50 MCG
1 TABLET ORAL EVERY EVENING
COMMUNITY

## 2019-01-01 RX ORDER — AMPICILLIN TRIHYDRATE 500 MG
500 CAPSULE ORAL 3 TIMES DAILY
Qty: 21 CAPSULE | Refills: 0 | Status: ON HOLD | OUTPATIENT
Start: 2019-01-01 | End: 2019-01-01

## 2019-01-01 RX ORDER — SODIUM CHLORIDE 9 MG/ML
1000 INJECTION, SOLUTION INTRAVENOUS CONTINUOUS
Status: DISCONTINUED | OUTPATIENT
Start: 2019-01-01 | End: 2019-01-01 | Stop reason: HOSPADM

## 2019-01-01 RX ORDER — OXYCODONE HYDROCHLORIDE 5 MG/1
5 TABLET ORAL EVERY 4 HOURS PRN
Qty: 10 TABLET | Refills: 0 | Status: ON HOLD | OUTPATIENT
Start: 2019-01-01 | End: 2019-01-01

## 2019-01-01 RX ORDER — ALLOPURINOL 100 MG/1
100 TABLET ORAL DAILY
Qty: 90 TABLET | Refills: 3 | Status: SHIPPED | OUTPATIENT
Start: 2019-01-01

## 2019-01-01 RX ORDER — OXYCODONE HYDROCHLORIDE 5 MG/1
5 TABLET ORAL EVERY 4 HOURS PRN
Qty: 20 TABLET | Refills: 0 | Status: ON HOLD | OUTPATIENT
Start: 2019-01-01 | End: 2019-01-01

## 2019-01-01 RX ORDER — ALLOPURINOL 100 MG/1
100 TABLET ORAL EVERY MORNING
Status: DISCONTINUED | OUTPATIENT
Start: 2019-01-01 | End: 2019-01-01 | Stop reason: HOSPADM

## 2019-01-01 RX ORDER — CIPROFLOXACIN 500 MG/1
500 TABLET, FILM COATED ORAL 2 TIMES DAILY
Qty: 14 TABLET | Refills: 0 | Status: SHIPPED | OUTPATIENT
Start: 2019-01-01 | End: 2019-01-01

## 2019-01-01 RX ORDER — IBUPROFEN 200 MG
CAPSULE ORAL 4 TIMES DAILY
Status: DISCONTINUED | OUTPATIENT
Start: 2019-01-01 | End: 2019-01-01 | Stop reason: HOSPADM

## 2019-01-01 RX ORDER — METHYLPHENIDATE HYDROCHLORIDE 10 MG/1
10 TABLET ORAL 2 TIMES DAILY
Qty: 60 TABLET | Refills: 0 | Status: ON HOLD | OUTPATIENT
Start: 2019-01-01 | End: 2019-01-01

## 2019-01-01 RX ORDER — LORAZEPAM 0.5 MG/1
0.5 TABLET ORAL EVERY 4 HOURS PRN
Qty: 10 TABLET | Refills: 0 | Status: SHIPPED | OUTPATIENT
Start: 2019-01-01

## 2019-01-01 RX ORDER — LORAZEPAM 0.5 MG/1
0.5 TABLET ORAL 2 TIMES DAILY PRN
Qty: 20 TABLET | Refills: 0 | Status: ON HOLD | OUTPATIENT
Start: 2019-01-01 | End: 2019-01-01

## 2019-01-01 RX ORDER — NITROFURANTOIN 25; 75 MG/1; MG/1
100 CAPSULE ORAL 2 TIMES DAILY
Qty: 14 CAPSULE | Refills: 0 | OUTPATIENT
Start: 2019-01-01 | End: 2019-01-01

## 2019-01-01 RX ORDER — ALLOPURINOL 100 MG/1
100 TABLET ORAL EVERY EVENING
Status: DISCONTINUED | OUTPATIENT
Start: 2019-01-01 | End: 2019-01-01 | Stop reason: HOSPADM

## 2019-01-01 RX ORDER — LIDOCAINE 50 MG/G
OINTMENT TOPICAL EVERY 4 HOURS PRN
Status: DISCONTINUED | OUTPATIENT
Start: 2019-01-01 | End: 2019-01-01 | Stop reason: HOSPADM

## 2019-01-01 RX ORDER — FINASTERIDE 5 MG/1
5 TABLET, FILM COATED ORAL DAILY
Qty: 90 TABLET | Refills: 3 | Status: ON HOLD | OUTPATIENT
Start: 2019-01-01 | End: 2019-01-01

## 2019-01-01 RX ORDER — LIDOCAINE HYDROCHLORIDE 20 MG/ML
JELLY TOPICAL
Status: DISCONTINUED | OUTPATIENT
Start: 2019-01-01 | End: 2019-01-01 | Stop reason: HOSPADM

## 2019-01-01 RX ORDER — ACETAMINOPHEN 650 MG/1
650 SUPPOSITORY RECTAL EVERY 4 HOURS PRN
Status: DISCONTINUED | OUTPATIENT
Start: 2019-01-01 | End: 2019-01-01 | Stop reason: HOSPADM

## 2019-01-01 RX ORDER — DOXYCYCLINE 100 MG/1
100 CAPSULE ORAL 2 TIMES DAILY
Qty: 14 CAPSULE | Refills: 0 | Status: SHIPPED | OUTPATIENT
Start: 2019-01-01 | End: 2019-01-01

## 2019-01-01 RX ORDER — TAMSULOSIN HYDROCHLORIDE 0.4 MG/1
0.4 CAPSULE ORAL EVERY MORNING
Status: DISCONTINUED | OUTPATIENT
Start: 2019-01-01 | End: 2019-01-01 | Stop reason: HOSPADM

## 2019-01-01 RX ORDER — CEFAZOLIN SODIUM 1 G/3ML
1 INJECTION, POWDER, FOR SOLUTION INTRAMUSCULAR; INTRAVENOUS EVERY 8 HOURS
Status: DISCONTINUED | OUTPATIENT
Start: 2019-01-01 | End: 2019-01-01

## 2019-01-01 RX ORDER — CEPHALEXIN 500 MG/1
500 CAPSULE ORAL 3 TIMES DAILY
Status: DISCONTINUED | OUTPATIENT
Start: 2019-01-01 | End: 2019-01-01 | Stop reason: HOSPADM

## 2019-01-01 RX ORDER — OXYCODONE HYDROCHLORIDE 5 MG/1
5-10 TABLET ORAL
Status: DISCONTINUED | OUTPATIENT
Start: 2019-01-01 | End: 2019-01-01 | Stop reason: HOSPADM

## 2019-01-01 RX ORDER — METHYLPHENIDATE HYDROCHLORIDE 10 MG/1
10 TABLET ORAL 2 TIMES DAILY
Qty: 60 TABLET | Refills: 0 | Status: SHIPPED | OUTPATIENT
Start: 2019-01-01 | End: 2019-01-01

## 2019-01-01 RX ORDER — IBUPROFEN 200 MG
CAPSULE ORAL 4 TIMES DAILY
DISCHARGE
Start: 2019-01-01

## 2019-01-01 RX ADMIN — CEFAZOLIN SODIUM 1 G: 1 INJECTION, SOLUTION INTRAVENOUS at 17:22

## 2019-01-01 RX ADMIN — ACETAMINOPHEN 1000 MG: 500 TABLET, FILM COATED ORAL at 21:47

## 2019-01-01 RX ADMIN — LIDOCAINE: 50 OINTMENT TOPICAL at 13:24

## 2019-01-01 RX ADMIN — SODIUM CHLORIDE 1000 ML: 9 INJECTION, SOLUTION INTRAVENOUS at 23:08

## 2019-01-01 RX ADMIN — ACETAMINOPHEN 1000 MG: 500 TABLET, FILM COATED ORAL at 08:14

## 2019-01-01 RX ADMIN — OXYCODONE HYDROCHLORIDE 5 MG: 5 TABLET ORAL at 19:21

## 2019-01-01 RX ADMIN — ALLOPURINOL 100 MG: 100 TABLET ORAL at 17:03

## 2019-01-01 RX ADMIN — METHYLPHENIDATE HYDROCHLORIDE 10 MG: 5 TABLET ORAL at 20:16

## 2019-01-01 RX ADMIN — AMPICILLIN 500 MG: 500 CAPSULE ORAL at 08:23

## 2019-01-01 RX ADMIN — METHYLPHENIDATE HYDROCHLORIDE 10 MG: 5 TABLET ORAL at 08:23

## 2019-01-01 RX ADMIN — BACITRACIN ZINC, NEOMYCIN, POLYMYXIN B: 400; 3.5; 5 OINTMENT TOPICAL at 11:08

## 2019-01-01 RX ADMIN — AMLODIPINE BESYLATE 10 MG: 10 TABLET ORAL at 08:23

## 2019-01-01 RX ADMIN — TAMSULOSIN HYDROCHLORIDE 0.4 MG: 0.4 CAPSULE ORAL at 17:03

## 2019-01-01 RX ADMIN — ALLOPURINOL 100 MG: 100 TABLET ORAL at 18:03

## 2019-01-01 RX ADMIN — AMPICILLIN 500 MG: 500 CAPSULE ORAL at 21:53

## 2019-01-01 RX ADMIN — METHYLPHENIDATE HYDROCHLORIDE 10 MG: 5 TABLET ORAL at 08:15

## 2019-01-01 RX ADMIN — ACETAMINOPHEN 500 MG: 500 TABLET, FILM COATED ORAL at 21:23

## 2019-01-01 RX ADMIN — SODIUM CHLORIDE, POTASSIUM CHLORIDE, SODIUM LACTATE AND CALCIUM CHLORIDE 1000 ML: 600; 310; 30; 20 INJECTION, SOLUTION INTRAVENOUS at 14:31

## 2019-01-01 RX ADMIN — HUMAN ALBUMIN MICROSPHERES AND PERFLUTREN 2 ML: 10; .22 INJECTION, SOLUTION INTRAVENOUS at 15:05

## 2019-01-01 RX ADMIN — IOPAMIDOL 100 ML: 755 INJECTION, SOLUTION INTRAVENOUS at 13:51

## 2019-01-01 RX ADMIN — AMPICILLIN 500 MG: 500 CAPSULE ORAL at 14:03

## 2019-01-01 RX ADMIN — CEPHALEXIN 500 MG: 500 CAPSULE ORAL at 14:29

## 2019-01-01 RX ADMIN — ACETAMINOPHEN 1000 MG: 500 TABLET, FILM COATED ORAL at 15:49

## 2019-01-01 RX ADMIN — AMLODIPINE BESYLATE 10 MG: 10 TABLET ORAL at 08:54

## 2019-01-01 RX ADMIN — ACETAMINOPHEN 1000 MG: 500 TABLET, FILM COATED ORAL at 20:16

## 2019-01-01 RX ADMIN — SODIUM CHLORIDE, POTASSIUM CHLORIDE, SODIUM LACTATE AND CALCIUM CHLORIDE 1000 ML: 600; 310; 30; 20 INJECTION, SOLUTION INTRAVENOUS at 00:00

## 2019-01-01 RX ADMIN — LOSARTAN POTASSIUM 100 MG: 50 TABLET, FILM COATED ORAL at 07:30

## 2019-01-01 RX ADMIN — Medication 3 MG: at 23:59

## 2019-01-01 RX ADMIN — IOPAMIDOL 68 ML: 755 INJECTION, SOLUTION INTRAVENOUS at 13:53

## 2019-01-01 RX ADMIN — SODIUM CHLORIDE 250 ML: 9 INJECTION, SOLUTION INTRAVENOUS at 12:49

## 2019-01-01 RX ADMIN — TAMSULOSIN HYDROCHLORIDE 0.4 MG: 0.4 CAPSULE ORAL at 08:15

## 2019-01-01 RX ADMIN — METHYLPHENIDATE HYDROCHLORIDE 10 MG: 5 TABLET ORAL at 08:36

## 2019-01-01 RX ADMIN — OXYCODONE HYDROCHLORIDE 5 MG: 5 TABLET ORAL at 18:30

## 2019-01-01 RX ADMIN — TAMSULOSIN HYDROCHLORIDE 0.4 MG: 0.4 CAPSULE ORAL at 18:03

## 2019-01-01 RX ADMIN — AMPICILLIN 500 MG: 500 CAPSULE ORAL at 08:54

## 2019-01-01 RX ADMIN — AMPICILLIN 500 MG: 500 CAPSULE ORAL at 20:17

## 2019-01-01 RX ADMIN — METHYLPHENIDATE HYDROCHLORIDE 5 MG: 5 TABLET ORAL at 10:46

## 2019-01-01 RX ADMIN — LIDOCAINE HYDROCHLORIDE: 20 JELLY TOPICAL at 21:47

## 2019-01-01 RX ADMIN — SODIUM CHLORIDE, POTASSIUM CHLORIDE, SODIUM LACTATE AND CALCIUM CHLORIDE 1000 ML: 600; 310; 30; 20 INJECTION, SOLUTION INTRAVENOUS at 16:02

## 2019-01-01 RX ADMIN — AMPICILLIN 500 MG: 500 CAPSULE ORAL at 20:26

## 2019-01-01 RX ADMIN — BACITRACIN ZINC, NEOMYCIN, POLYMYXIN B: 400; 3.5; 5 OINTMENT TOPICAL at 09:48

## 2019-01-01 RX ADMIN — LOSARTAN POTASSIUM 100 MG: 50 TABLET, FILM COATED ORAL at 08:21

## 2019-01-01 RX ADMIN — LIDOCAINE HYDROCHLORIDE: 20 JELLY TOPICAL at 15:15

## 2019-01-01 RX ADMIN — TAMSULOSIN HYDROCHLORIDE 0.4 MG: 0.4 CAPSULE ORAL at 18:10

## 2019-01-01 RX ADMIN — ACETAMINOPHEN 1000 MG: 500 TABLET, FILM COATED ORAL at 08:09

## 2019-01-01 RX ADMIN — BACITRACIN ZINC, NEOMYCIN, POLYMYXIN B: 400; 3.5; 5 OINTMENT TOPICAL at 16:18

## 2019-01-01 RX ADMIN — INFLUENZA A VIRUS A/MICHIGAN/45/2015 X-275 (H1N1) ANTIGEN (FORMALDEHYDE INACTIVATED), INFLUENZA A VIRUS A/SINGAPORE/INFIMH-16-0019/2016 IVR-186 (H3N2) ANTIGEN (FORMALDEHYDE INACTIVATED), AND INFLUENZA B VIRUS B/MARYLAND/15/2016 BX-69A (A B/COLORADO/6/2017-LIKE VIRUS) ANTIGEN (FORMALDEHYDE INACTIVATED) 0.5 ML: 60; 60; 60 INJECTION, SUSPENSION INTRAMUSCULAR at 09:27

## 2019-01-01 RX ADMIN — TAMSULOSIN HYDROCHLORIDE 0.4 MG: 0.4 CAPSULE ORAL at 18:38

## 2019-01-01 RX ADMIN — LOSARTAN POTASSIUM 100 MG: 50 TABLET, FILM COATED ORAL at 08:09

## 2019-01-01 RX ADMIN — ACETAMINOPHEN 1000 MG: 500 TABLET, FILM COATED ORAL at 14:17

## 2019-01-01 RX ADMIN — CEPHALEXIN 500 MG: 500 CAPSULE ORAL at 09:48

## 2019-01-01 RX ADMIN — AMPICILLIN 500 MG: 500 CAPSULE ORAL at 06:18

## 2019-01-01 RX ADMIN — METHYLPHENIDATE HYDROCHLORIDE 10 MG: 5 TABLET ORAL at 08:09

## 2019-01-01 RX ADMIN — METHYLPHENIDATE HYDROCHLORIDE 10 MG: 5 TABLET ORAL at 19:27

## 2019-01-01 RX ADMIN — LOSARTAN POTASSIUM 100 MG: 50 TABLET, FILM COATED ORAL at 08:23

## 2019-01-01 RX ADMIN — ALLOPURINOL 100 MG: 100 TABLET ORAL at 08:09

## 2019-01-01 RX ADMIN — AMPICILLIN 500 MG: 500 CAPSULE ORAL at 17:03

## 2019-01-01 RX ADMIN — OXYCODONE HYDROCHLORIDE 5 MG: 5 TABLET ORAL at 03:35

## 2019-01-01 RX ADMIN — Medication 2.5 MG: at 11:41

## 2019-01-01 RX ADMIN — TAMSULOSIN HYDROCHLORIDE 0.4 MG: 0.4 CAPSULE ORAL at 08:09

## 2019-01-01 RX ADMIN — AMPICILLIN 500 MG: 500 CAPSULE ORAL at 18:09

## 2019-01-01 RX ADMIN — ALLOPURINOL 100 MG: 100 TABLET ORAL at 08:36

## 2019-01-01 RX ADMIN — METHYLPHENIDATE HYDROCHLORIDE 5 MG: 5 TABLET ORAL at 14:14

## 2019-01-01 RX ADMIN — SENNOSIDES AND DOCUSATE SODIUM 1 TABLET: 8.6; 5 TABLET ORAL at 21:48

## 2019-01-01 RX ADMIN — Medication 6 OZ: at 16:28

## 2019-01-01 RX ADMIN — ALLOPURINOL 100 MG: 100 TABLET ORAL at 08:15

## 2019-01-01 RX ADMIN — SODIUM CHLORIDE, POTASSIUM CHLORIDE, SODIUM LACTATE AND CALCIUM CHLORIDE 1000 ML: 600; 310; 30; 20 INJECTION, SOLUTION INTRAVENOUS at 08:09

## 2019-01-01 RX ADMIN — ACETAMINOPHEN 1000 MG: 500 TABLET, FILM COATED ORAL at 08:36

## 2019-01-01 RX ADMIN — ALLOPURINOL 100 MG: 100 TABLET ORAL at 18:38

## 2019-01-01 RX ADMIN — SODIUM CHLORIDE, POTASSIUM CHLORIDE, SODIUM LACTATE AND CALCIUM CHLORIDE 1000 ML: 600; 310; 30; 20 INJECTION, SOLUTION INTRAVENOUS at 10:39

## 2019-01-01 RX ADMIN — LOSARTAN POTASSIUM 100 MG: 50 TABLET, FILM COATED ORAL at 08:54

## 2019-01-01 RX ADMIN — METHYLPHENIDATE HYDROCHLORIDE 10 MG: 5 TABLET ORAL at 13:27

## 2019-01-01 RX ADMIN — SODIUM PHOSPHATE 1 ENEMA: 7; 19 ENEMA RECTAL at 12:16

## 2019-01-01 RX ADMIN — ACETAMINOPHEN 1000 MG: 500 TABLET, FILM COATED ORAL at 14:29

## 2019-01-01 RX ADMIN — AMPICILLIN 500 MG: 500 CAPSULE ORAL at 11:27

## 2019-01-01 RX ADMIN — CEFTRIAXONE 1 G: 1 INJECTION, POWDER, FOR SOLUTION INTRAMUSCULAR; INTRAVENOUS at 23:14

## 2019-01-01 RX ADMIN — AMLODIPINE BESYLATE 10 MG: 10 TABLET ORAL at 08:09

## 2019-01-01 RX ADMIN — TAMSULOSIN HYDROCHLORIDE 0.4 MG: 0.4 CAPSULE ORAL at 08:37

## 2019-01-01 RX ADMIN — SODIUM CHLORIDE 95 ML: 9 INJECTION, SOLUTION INTRAVENOUS at 13:53

## 2019-01-01 RX ADMIN — CEPHALEXIN 500 MG: 500 CAPSULE ORAL at 08:36

## 2019-01-01 RX ADMIN — BACITRACIN ZINC, NEOMYCIN, POLYMYXIN B: 400; 3.5; 5 OINTMENT TOPICAL at 22:00

## 2019-01-01 RX ADMIN — AMPICILLIN 500 MG: 500 CAPSULE ORAL at 07:30

## 2019-01-01 RX ADMIN — Medication 1 BOTTLE: at 09:23

## 2019-01-01 RX ADMIN — SODIUM CHLORIDE 80 ML: 9 INJECTION, SOLUTION INTRAVENOUS at 13:52

## 2019-01-01 RX ADMIN — AMPICILLIN 500 MG: 500 CAPSULE ORAL at 23:01

## 2019-01-01 RX ADMIN — OXYCODONE HYDROCHLORIDE 5 MG: 5 TABLET ORAL at 17:49

## 2019-01-01 RX ADMIN — AMLODIPINE BESYLATE 10 MG: 10 TABLET ORAL at 08:21

## 2019-01-01 RX ADMIN — LIDOCAINE HYDROCHLORIDE: 20 JELLY TOPICAL at 10:48

## 2019-01-01 RX ADMIN — LIDOCAINE HYDROCHLORIDE: 20 JELLY TOPICAL at 08:16

## 2019-01-01 RX ADMIN — Medication 6 OZ: at 14:18

## 2019-01-01 RX ADMIN — SENNOSIDES AND DOCUSATE SODIUM 1 TABLET: 8.6; 5 TABLET ORAL at 22:20

## 2019-01-01 RX ADMIN — CEPHALEXIN 500 MG: 500 CAPSULE ORAL at 20:16

## 2019-01-01 RX ADMIN — BACITRACIN ZINC, NEOMYCIN, POLYMYXIN B: 400; 3.5; 5 OINTMENT TOPICAL at 14:51

## 2019-01-01 RX ADMIN — METHYLPHENIDATE HYDROCHLORIDE 10 MG: 5 TABLET ORAL at 14:09

## 2019-01-01 RX ADMIN — ALLOPURINOL 100 MG: 100 TABLET ORAL at 18:10

## 2019-01-01 RX ADMIN — CEFAZOLIN SODIUM 1 G: 1 INJECTION, SOLUTION INTRAVENOUS at 01:13

## 2019-01-01 RX ADMIN — AMLODIPINE BESYLATE 10 MG: 10 TABLET ORAL at 07:30

## 2019-01-01 ASSESSMENT — MIFFLIN-ST. JEOR
SCORE: 1340.38
SCORE: 1356.68
SCORE: 1333.1
SCORE: 1379.02
SCORE: 1320.38
SCORE: 1318.38
SCORE: 1333.35
SCORE: 1325.84
SCORE: 1401.14
SCORE: 1324.02
SCORE: 1392.06
SCORE: 1401.14
SCORE: 1392.06
SCORE: 1362.8
SCORE: 1310.42
SCORE: 1319.49
SCORE: 1310.42
SCORE: 1392.06
SCORE: 1314.95
SCORE: 1310.42
SCORE: 1346.7
SCORE: 1345.38

## 2019-01-01 ASSESSMENT — ACTIVITIES OF DAILY LIVING (ADL)
SWALLOWING: 0-->SWALLOWS FOODS/LIQUIDS WITHOUT DIFFICULTY
ADLS_ACUITY_SCORE: 14
NUMBER_OF_TIMES_PATIENT_HAS_FALLEN_WITHIN_LAST_SIX_MONTHS: 1
ADLS_ACUITY_SCORE: 14
ADLS_ACUITY_SCORE: 15
ADLS_ACUITY_SCORE: 10
BATHING: 0-->INDEPENDENT
ADLS_ACUITY_SCORE: 14
FALL_HISTORY_WITHIN_LAST_SIX_MONTHS: YES
RETIRED_EATING: 0-->INDEPENDENT
AMBULATION: 0-->INDEPENDENT
ADLS_ACUITY_SCORE: 15
ADLS_ACUITY_SCORE: 10
ADLS_ACUITY_SCORE: 10
ADLS_ACUITY_SCORE: 14
ADLS_ACUITY_SCORE: 14
ADLS_ACUITY_SCORE: 10
PREVIOUS_RESPONSIBILITIES: MEAL PREP;HOUSEKEEPING;LAUNDRY
ADLS_ACUITY_SCORE: 15
CURRENT_FUNCTION: NO ASSISTANCE NEEDED
ADLS_ACUITY_SCORE: 14
RETIRED_COMMUNICATION: 0-->UNDERSTANDS/COMMUNICATES WITHOUT DIFFICULTY
ADLS_ACUITY_SCORE: 10
ADLS_ACUITY_SCORE: 14
DRESS: 0-->INDEPENDENT
ADLS_ACUITY_SCORE: 14
ADLS_ACUITY_SCORE: 10
ADLS_ACUITY_SCORE: 14
TOILETING: 0-->INDEPENDENT
TRANSFERRING: 0-->INDEPENDENT
ADLS_ACUITY_SCORE: 14
ADLS_ACUITY_SCORE: 14
DEPENDENT_IADLS:: INDEPENDENT;TRANSPORTATION
COGNITION: 0 - NO COGNITION ISSUES REPORTED
ADLS_ACUITY_SCORE: 14
DEPENDENT_IADLS:: INDEPENDENT

## 2019-01-01 ASSESSMENT — ENCOUNTER SYMPTOMS
FREQUENCY: 0
DYSURIA: 0
SINUS PRESSURE: 0
WHEEZING: 0
HEADACHES: 0
SORE THROAT: 0
HEMATURIA: 1
DIAPHORESIS: 0
CHILLS: 0
FEVER: 0
VOMITING: 0
DIARRHEA: 0
PALPITATIONS: 0
BLOOD IN STOOL: 0
NAUSEA: 0
COUGH: 0
ABDOMINAL PAIN: 0
CONSTIPATION: 0
SHORTNESS OF BREATH: 0

## 2019-01-01 ASSESSMENT — PAIN SCALES - GENERAL
PAINLEVEL: NO PAIN (0)
PAINLEVEL: MODERATE PAIN (4)
PAINLEVEL: NO PAIN (0)
PAINLEVEL: SEVERE PAIN (6)
PAINLEVEL: MODERATE PAIN (4)

## 2019-03-13 NOTE — TELEPHONE ENCOUNTER
"Requested Prescriptions   Pending Prescriptions Disp Refills     spironolactone (ALDACTONE) 25 MG tablet 90 tablet 3    Last Written Prescription Date:  5/16/18 ended  Last Fill Quantity: 90 tab,  # refills: 3   Last office visit: 5/31/2018 with prescribing provider:  Nurse   Future Office Visit:     Sig: Take 1 tablet (25 mg) by mouth daily    Diuretics (Including Combos) Protocol Failed - 3/13/2019 10:23 AM       Failed - Blood pressure under 140/90 in past 12 months    BP Readings from Last 3 Encounters:   05/31/18 163/69   05/16/18 136/69   04/13/18 151/67                Failed - Medication is active on med list       Failed - Normal serum creatinine on file in past 12 months    Recent Labs   Lab Test 04/13/18  1044   CR 1.26*             Passed - Recent (12 mo) or future (30 days) visit within the authorizing provider's specialty    Patient had office visit in the last 12 months or has a visit in the next 30 days with authorizing provider or within the authorizing provider's specialty.  See \"Patient Info\" tab in inbasket, or \"Choose Columns\" in Meds & Orders section of the refill encounter.             Passed - Patient is age 18 or older       Passed - Normal serum potassium on file in past 12 months    Recent Labs   Lab Test 04/13/18  1044   POTASSIUM 3.9                   Passed - Normal serum sodium on file in past 12 months    Recent Labs   Lab Test 04/13/18  1044                   "

## 2019-03-14 NOTE — TELEPHONE ENCOUNTER
"Requested Prescriptions   Pending Prescriptions Disp Refills     spironolactone (ALDACTONE) 25 MG tablet 90 tablet 3     Sig: Take 1 tablet (25 mg) by mouth daily    Diuretics (Including Combos) Protocol Failed - 3/13/2019 10:24 AM       Failed - Blood pressure under 140/90 in past 12 months    BP Readings from Last 3 Encounters:   05/31/18 163/69   05/16/18 136/69   04/13/18 151/67                Failed - Medication is active on med list       Failed - Normal serum creatinine on file in past 12 months    Recent Labs   Lab Test 04/13/18  1044   CR 1.26*             Passed - Recent (12 mo) or future (30 days) visit within the authorizing provider's specialty    Patient had office visit in the last 12 months or has a visit in the next 30 days with authorizing provider or within the authorizing provider's specialty.  See \"Patient Info\" tab in inbasket, or \"Choose Columns\" in Meds & Orders section of the refill encounter.             Passed - Patient is age 18 or older       Passed - Normal serum potassium on file in past 12 months    Recent Labs   Lab Test 04/13/18  1044   POTASSIUM 3.9                   Passed - Normal serum sodium on file in past 12 months    Recent Labs   Lab Test 04/13/18  1044                   "

## 2019-03-14 NOTE — TELEPHONE ENCOUNTER
"Patient was advised to start taking the spironolactone last spring due to the elevated blood pressure in the clinic on 5/31/18. Spoke with pharmacy, patient has filled this prescription 3 times since May.  Per MD notes:      Eveline Shanks MD      May 31, 2018   5:03 PM   Note      Does he still have the spironolactone prescription? He could start given higher blood pressure.        Medication is being filled for 1 time refill only due to:  needs labs and bp recheck appt.      Called patient, he states that he has never taken this med.  I told him that the pharmacy has been filling it for him, he states \"I know they keep filling it but I've never taken it.  It's only 6 bucks\".  I advised patient that I would remove it from his med list but he is due for an appointment to recheck his blood pressure.  Patient states that he will make an appointment in May.      Kaley Louis RN    "

## 2019-05-09 PROBLEM — I35.0 AORTIC VALVE STENOSIS, ETIOLOGY OF CARDIAC VALVE DISEASE UNSPECIFIED: Status: ACTIVE | Noted: 2019-01-01

## 2019-05-09 PROBLEM — I10 ESSENTIAL HYPERTENSION, BENIGN: Status: ACTIVE | Noted: 2019-01-01

## 2019-05-09 PROBLEM — M10.9 GOUT, UNSPECIFIED CAUSE, UNSPECIFIED CHRONICITY, UNSPECIFIED SITE: Status: ACTIVE | Noted: 2019-01-01

## 2019-05-09 NOTE — PROGRESS NOTES
"SUBJECTIVE:   Andres Sow is a 85 year old male who presents for Preventive Visit.  Are you in the first 12 months of your Medicare coverage?  No    Healthy Habits:    In general, how would you rate your overall health?  Good    Frequency of exercise:  1 day/week    Duration of exercise:  Less than 15 minutes    Do you usually eat at least 4 servings of fruit and vegetables a day, include whole grains    & fiber and avoid regularly eating high fat or \"junk\" foods?  Yes    Taking medications regularly:  Yes    Barriers to taking medications:  None    Medication side effects:  None    Ability to successfully perform activities of daily living:  No assistance needed    Home Safety:  No safety concerns identified    Hearing Impairment:  Difficulty understanding soft or whispered speech and difficulty following a conversation in a noisy restaurant or crowded room    In the past 6 months, have you been bothered by leaking of urine? Yes    In general, how would you rate your overall mental or emotional health?  Good      PHQ-2 Total Score:    Additional concerns today:  No    Do you feel safe in your environment? Yes    Do you have a Health Care Directive? No: Advance care planning reviewed with patient; information given to patient to review.      Fall risk  Fallen 2 or more times in the past year?: No  Any fall with injury in the past year?: No    Cognitive Screening   1) Repeat 3 items (Leader, Season, Table)    2) Clock draw: NORMAL  3) 3 item recall: Recalls 2 objects   Results: NORMAL clock, 1-2 items recalled: COGNITIVE IMPAIRMENT LESS LIKELY    Mini-CogTM Copyright LUCHO Guillory. Licensed by the author for use in [facility]; reprinted with permission (nicola@.[facility]). All rights reserved.      Do you have sleep apnea, excessive snoring or daytime drowsiness?: no    Reviewed and updated as needed this visit by clinical staff  Tobacco  Allergies  Meds  Med Hx  Surg Hx  Fam Hx  Soc Hx        Reviewed " and updated as needed this visit by Provider        Social History     Tobacco Use     Smoking status: Former Smoker     Years: 25.00     Types: Cigarettes, Pipe, Cigars     Last attempt to quit: 1978     Years since quittin.3     Smokeless tobacco: Never Used   Substance Use Topics     Alcohol use: Yes     Alcohol/week: 36.0 oz     Comment: drinks 2 drinks daily     If you drink alcohol do you typically have >3 drinks per day or >7 drinks per week? No    Alcohol Use 2019   Prescreen: >3 drinks/day or >7 drinks/week? No     Chief Complaint   Patient presents with     Physical        Current providers sharing in care for this patient include: Patient Care Team:  Eveline Shanks MD as PCP - General (Family Practice)  Eveline Shanks MD as Assigned PCP    The following health maintenance items are reviewed in Epic and correct as of today:  Health Maintenance   Topic Date Due     ZOSTER IMMUNIZATION (2 of 3) 2013     COLONOSCOPY Q3 YR  2015     PHQ-2  2019     MEDICARE ANNUAL WELLNESS VISIT  2019     FALL RISK ASSESSMENT  2019     ADVANCE DIRECTIVE PLANNING Q5 YRS  2023     DTAP/TDAP/TD IMMUNIZATION (3 - Td) 2024     INFLUENZA VACCINE  Completed     IPV IMMUNIZATION  Aged Out     MENINGITIS IMMUNIZATION  Aged Out     Labs reviewed in EPIC  Patient Active Problem List   Diagnosis     Hyperlipidemia LDL goal <100     Bradycardia     Colon polyps     Anxiety     Hypertension goal BP (blood pressure) < 150/90     Advanced directives, counseling/discussion     CKD (chronic kidney disease) stage 3, GFR 30-59 ml/min (H)     Past Surgical History:   Procedure Laterality Date     COLONOSCOPY  2007    Repeat colonoscopy in 3 years for surveillance     HC REMOVAL OF TONSILS,<13 Y/O         Social History     Tobacco Use     Smoking status: Former Smoker     Years: 25.00     Types: Cigarettes, Pipe, Cigars     Last attempt to quit: 1978     Years  "since quittin.3     Smokeless tobacco: Never Used   Substance Use Topics     Alcohol use: Yes     Alcohol/week: 36.0 oz     Comment: drinks 2 drinks daily     Family History   Problem Relation Age of Onset     Hypertension Father      Breast Cancer Sister      Cerebrovascular Disease Sister          Current Outpatient Medications   Medication Sig Dispense Refill     allopurinol (ZYLOPRIM) 100 MG tablet Take 1 tablet (100 mg) by mouth daily 90 tablet 3     amLODIPine (NORVASC) 10 MG tablet Take 1 tablet (10 mg) by mouth daily 90 tablet 3     ASPIRIN CAPS 81 MG OR 1 tablet daily       LORazepam (ATIVAN) 0.5 MG tablet Take 1 tablet (0.5 mg) by mouth 2 times daily as needed for anxiety 20 tablet 0     losartan (COZAAR) 100 MG tablet Take 1 tablet (100 mg) by mouth daily 90 tablet 3     simvastatin (ZOCOR) 20 MG tablet Take 1 tablet (20 mg) by mouth At Bedtime 90 tablet 3     triamcinolone (KENALOG) 0.1 % cream Apply sparingly to affected area three times daily as needed 80 g 0     VITAMIN D 1000 UNIT OR CAPS 2 CAPSULE BY MOUTH ONCE DAILY       Multiple Vitamins-Minerals (PRESERVISION AREDS 2) CAPS Take by mouth 2 times daily       VITAMIN-B COMPLEX PO TABS 1 tablet by mouth once daily       Allergies   Allergen Reactions     Hydrochlorothiazide      Low Potassium     Lisinopril      Neck swelling/airway       Review of Systems  Constitutional, neuro, ENT, endocrine, pulmonary, cardiac, gastrointestinal, genitourinary, musculoskeletal, integument and psychiatric systems are negative, except as otherwise noted.     OBJECTIVE:   /54   Pulse 77   Temp 97.8  F (36.6  C) (Tympanic)   Resp 15   Ht 1.746 m (5' 8.75\")   Wt 70.8 kg (156 lb)   SpO2 96%   BMI 23.21 kg/m   Estimated body mass index is 23.21 kg/m  as calculated from the following:    Height as of this encounter: 1.746 m (5' 8.75\").    Weight as of this encounter: 70.8 kg (156 lb).  Physical Exam  GENERAL: healthy, alert and no distress  EYES: Eyes " grossly normal to inspection, PERRL and conjunctivae and sclerae normal  HENT: ear canals and TM's normal, nose and mouth without ulcers or lesions  NECK: no adenopathy, no asymmetry, masses, or scars and thyroid normal to palpation  RESP: lungs clear to auscultation - no rales, rhonchi or wheezes  CV: regular rate and rhythm, normal S1 S2, no S3 or S4, 2/5 ASHLI LUSB, click or rub, no peripheral edema and peripheral pulses strong  ABDOMEN: soft, nontender, no hepatosplenomegaly, no masses and bowel sounds normal  MS: no gross musculoskeletal defects noted, no edema. Large chronic left olecranon bursitis.  SKIN: no suspicious lesions or rashes  NEURO: Normal strength and tone, mentation intact and speech normal  PSYCH: mentation appears normal, affect normal/bright    ASSESSMENT / PLAN:   1. Well adult exam    2. Gout, unspecified cause, unspecified chronicity, unspecified site  Well controlled. Refilled medication.     - allopurinol (ZYLOPRIM) 100 MG tablet; Take 1 tablet (100 mg) by mouth daily  Dispense: 90 tablet; Refill: 3    3. Essential hypertension, benign  Well controlled. Refilled medication. Check labs.    - amLODIPine (NORVASC) 10 MG tablet; Take 1 tablet (10 mg) by mouth daily  Dispense: 90 tablet; Refill: 3  - losartan (COZAAR) 100 MG tablet; Take 1 tablet (100 mg) by mouth daily  Dispense: 90 tablet; Refill: 3  - Basic metabolic panel    4. Hyperlipidemia LDL goal <100  Well controlled. Refilled medication.   Check labs.   - simvastatin (ZOCOR) 20 MG tablet; Take 1 tablet (20 mg) by mouth At Bedtime  Dispense: 90 tablet; Refill: 3  - Lipid panel reflex to direct LDL Fasting    5. Anxiety  Stable. Still has plenty of lorazepam.    6. Aortic valve stenosis, etiology of cardiac valve disease unspecified  aortic stenosis murmur heard on exam. Mild aortic stenosis on ECHO 2012. Asymptomatic. Would not consider any surgical interventions so will not repeat ECHO.    7. Prostate cancer screening  Having  "difficulty initiating stream and weak stream. Would not treat prostatic cancer if he did have it. Discussed not checking PSA in that case but he would still like to check. Will take a conservative approach if elevated. Discussed Flomax but he'd like to try saw palmetto first.  - Prostate spec antigen screen    End of Life Planning:  Patient currently has an advanced directive: No.  I have verified the patient's ablity to prepare an advanced directive/make health care decisions.  Literature was provided to assist patient in preparing an advanced directive.    COUNSELING:  Reviewed preventive health counseling, as reflected in patient instructions    BP Readings from Last 1 Encounters:   05/09/19 141/54     Estimated body mass index is 23.21 kg/m  as calculated from the following:    Height as of this encounter: 1.746 m (5' 8.75\").    Weight as of this encounter: 70.8 kg (156 lb).           reports that he quit smoking about 41 years ago. His smoking use included cigarettes, pipe, and cigars. He quit after 25.00 years of use. He has never used smokeless tobacco.      Appropriate preventive services were discussed with this patient, including applicable screening as appropriate for cardiovascular disease, diabetes, osteopenia/osteoporosis, and glaucoma.  As appropriate for age/gender, discussed screening for colorectal cancer, prostate cancer, breast cancer, and cervical cancer. Checklist reviewing preventive services available has been given to the patient.    Reviewed patients plan of care and provided an AVS. The Intermediate Care Plan ( asthma action plan, low back pain action plan, and migraine action plan) for Andres meets the Care Plan requirement. This Care Plan has been established and reviewed with the Patient.    Counseling Resources:  ATP IV Guidelines  Pooled Cohorts Equation Calculator  Breast Cancer Risk Calculator  FRAX Risk Assessment  ICSI Preventive Guidelines  Dietary Guidelines for Americans, " 2010  USDA's MyPlate  ASA Prophylaxis  Lung CA Screening    Eveline Shanks MD  [facility]– [place]    Identified Health Risks:

## 2019-05-17 NOTE — TELEPHONE ENCOUNTER
Reason for Call:  Medication or medication refill:    Do you use a [facility]?  Name of the pharmacy and phone number for the current request:  [facility] [place] [phone]    Name of the medication requested: flomax was suggested at last OV and pt would now like this    Other request: pt is having trouble urinating this was talked about at the OV as far as Pt remembers    Can we leave a detailed message on this number? YES    Phone number patient can be reached at: Home number on file [phone] (home)    Best Time: any    Call taken on 5/17/2019 at 1:48 PM by Amparo Augustine

## 2019-05-17 NOTE — TELEPHONE ENCOUNTER
Please see message below. Patient was seen on 5/9/19:  . Prostate cancer screening  Having difficulty initiating stream and weak stream. Would not treat prostatic cancer if he did have it. Discussed not checking PSA in that case but he would still like to check. Will take a conservative approach if elevated. Discussed Flomax but he'd like to try saw palmetto first.  - Prostate spec antigen screen    Thank you    Blanca ARENAS RN

## 2019-05-20 NOTE — ED AVS SNAPSHOT
[facility] Emergency Department  [address] [address]  Phone:  [phone]  Fax:  [phone]                                    Andres Sow   MRN: [id number]    Department:  [facility] Emergency Department   Date of Visit:  5/20/2019           After Visit Summary Signature Page    I have received my discharge instructions, and my questions have been answered. I have discussed any challenges I see with this plan with the nurse or doctor.    ..........................................................................................................................................  Patient/Patient Representative Signature      ..........................................................................................................................................  Patient Representative Print Name and Relationship to Patient    ..................................................               ................................................  Date                                   Time    ..........................................................................................................................................  Reviewed by Signature/Title    ...................................................              ..............................................  Date                                               Time          22EPIC Rev 08/18

## 2019-05-21 NOTE — ED NOTES
Pt here with urinary frequency, started today. Also noticed blood in urine. No flank pain. No hx of UTI's or kidney stones. No fever, no N/V.

## 2019-05-21 NOTE — ED PROVIDER NOTES
History     Chief Complaint   Patient presents with     UTI     frequency, hematuria     HPI  Andres Sow is a 85 year old male who presents with complaints of urinary frequency and hematuria that started earlier today.  He does state that in the past he has had some mild urinary symptoms but not to this extent.  Denies any nausea or vomiting.  No fever or chills.  No flank pain.  No history of kidney stone or pyelonephritis.  No diarrhea or dysuria.  No injury to the penis.  No fall or back injury.  No rash over the penis.  Former smoker.  No treatment prior to arrival.  He apparently has had issues with urinary stream and was recently recommended to start Flomax for that.  He has not picked that up yet.  They did do a PSA which he states was normal.  Does not see the urologist.    Allergies:  Allergies   Allergen Reactions     Hydrochlorothiazide      Low Potassium     Lisinopril      Neck swelling/airway       Problem List:    Patient Active Problem List    Diagnosis Date Noted     Aortic valve stenosis, etiology of cardiac valve disease unspecified 05/09/2019     Priority: Medium     Gout, unspecified cause, unspecified chronicity, unspecified site 05/09/2019     Priority: Medium     Essential hypertension, benign 05/09/2019     Priority: Medium     Advanced directives, counseling/discussion 05/16/2018     Priority: Medium     Patient does not have an Advance/Health Care Directive (HCD), requests blank HCD form.    Lucita Farley  May 16, 2018         Hypertension goal BP (blood pressure) < 150/90 03/29/2017     Priority: Medium     Anxiety 06/17/2014     Priority: Medium     Colon polyps 08/01/2012     Priority: Medium     Benign tubular adenomatous polyps - repeat colon in 5 years       Bradycardia 07/27/2012     Priority: Medium     CKD (chronic kidney disease) stage 3, GFR 30-59 ml/min (H) 01/01/2012     Priority: Medium     Hyperlipidemia LDL goal <100 10/31/2010     Priority: Medium        Past  "Medical History:    Past Medical History:   Diagnosis Date     Hypertension      Unspecified vertiginous syndromes and labyrinthine disorders        Past Surgical History:    Past Surgical History:   Procedure Laterality Date     COLONOSCOPY  2007    Repeat colonoscopy in 3 years for surveillance     HC REMOVAL OF TONSILS,<11 Y/O         Family History:    Family History   Problem Relation Age of Onset     Hypertension Father      Breast Cancer Sister      Cerebrovascular Disease Sister        Social History:  Marital Status:   [5]  Social History     Tobacco Use     Smoking status: Former Smoker     Years: 25.00     Types: Cigarettes, Pipe, Cigars     Last attempt to quit: 1978     Years since quittin.4     Smokeless tobacco: Never Used   Substance Use Topics     Alcohol use: Yes     Alcohol/week: 36.0 oz     Comment: drinks 2 drinks daily     Drug use: No        Medications:      ciprofloxacin (CIPRO) 500 MG tablet   allopurinol (ZYLOPRIM) 100 MG tablet   amLODIPine (NORVASC) 10 MG tablet   ASPIRIN CAPS 81 MG OR   LORazepam (ATIVAN) 0.5 MG tablet   losartan (COZAAR) 100 MG tablet   Multiple Vitamins-Minerals (PRESERVISION AREDS 2) CAPS   simvastatin (ZOCOR) 20 MG tablet   tamsulosin (FLOMAX) 0.4 MG capsule   VITAMIN D 1000 UNIT OR CAPS   VITAMIN-B COMPLEX PO TABS         Review of Systems all other systems are reviewed and are negative.    Physical Exam   BP: 148/68  Pulse: 62  Temp: 97.9  F (36.6  C)  Resp: 16  Height: 175.3 cm (5' 9\")  Weight: 72.6 kg (160 lb)  SpO2: 97 %      Physical Exam alert cooperative male in mild distress.  On exam he has no scleral icterus.  Neck is supple.  Lungs are clear without adventitious sounds.  No CVA tenderness.  Abdominal exam revealed active bowel sounds on palpation no localizing tenderness, masses, or organomegaly.  Genitals were without lesion.    ED Course        Procedures               Critical Care time:  none               Results for orders " placed or performed during the hospital encounter of 05/20/19 (from the past 24 hour(s))   UA reflex to Microscopic   Result Value Ref Range    Color Urine Red     Appearance Urine Slightly Cloudy     Glucose Urine 50 (A) NEG^Negative mg/dL    Bilirubin Urine Negative NEG^Negative    Ketones Urine Negative NEG^Negative mg/dL    Specific Gravity Urine 1.009 1.003 - 1.035    Blood Urine Large (A) NEG^Negative    pH Urine 6.0 5.0 - 7.0 pH    Protein Albumin Urine 100 (A) NEG^Negative mg/dL    Urobilinogen mg/dL 0.0 0.0 - 2.0 mg/dL    Nitrite Urine Negative NEG^Negative    Leukocyte Esterase Urine Trace (A) NEG^Negative    Source Midstream Urine     RBC Urine >182 (H) 0 - 2 /HPF    WBC Urine >182 (H) 0 - 5 /HPF    Bacteria Urine Moderate (A) NEG^Negative /HPF   CBC with platelets differential   Result Value Ref Range    WBC 10.4 4.0 - 11.0 10e9/L    RBC Count 3.23 (L) 4.4 - 5.9 10e12/L    Hemoglobin 10.8 (L) 13.3 - 17.7 g/dL    Hematocrit 32.8 (L) 40.0 - 53.0 %     (H) 78 - 100 fl    MCH 33.4 (H) 26.5 - 33.0 pg    MCHC 32.9 31.5 - 36.5 g/dL    RDW 13.2 10.0 - 15.0 %    Platelet Count 218 150 - 450 10e9/L    Diff Method Automated Method     % Neutrophils 61.0 %    % Lymphocytes 16.0 %    % Monocytes 7.2 %    % Eosinophils 14.3 %    % Basophils 1.2 %    % Immature Granulocytes 0.3 %    Nucleated RBCs 0 0 /100    Absolute Neutrophil 6.3 1.6 - 8.3 10e9/L    Absolute Lymphocytes 1.7 0.8 - 5.3 10e9/L    Absolute Monocytes 0.8 0.0 - 1.3 10e9/L    Absolute Eosinophils 1.5 (H) 0.0 - 0.7 10e9/L    Absolute Basophils 0.1 0.0 - 0.2 10e9/L    Abs Immature Granulocytes 0.0 0 - 0.4 10e9/L    Absolute Nucleated RBC 0.0    Basic metabolic panel   Result Value Ref Range    Sodium 139 133 - 144 mmol/L    Potassium 3.8 3.4 - 5.3 mmol/L    Chloride 106 94 - 109 mmol/L    Carbon Dioxide 23 20 - 32 mmol/L    Anion Gap 10 3 - 14 mmol/L    Glucose 106 (H) 70 - 99 mg/dL    Urea Nitrogen 23 7 - 30 mg/dL    Creatinine 1.19 0.66 - 1.25 mg/dL     GFR Estimate 55 (L) >60 mL/min/[1.73_m2]    GFR Estimate If Black 64 >60 mL/min/[1.73_m2]    Calcium 9.4 8.5 - 10.1 mg/dL       Medications   0.9% sodium chloride BOLUS (1,000 mLs Intravenous New Bag 5/20/19 5371)     Followed by   sodium chloride 0.9% infusion (has no administration in time range)   cefTRIAXone (ROCEPHIN) 1 g vial to attach to  mL bag for ADULTS or NS 50 mL bag for PEDS (1 g Intravenous New Bag 5/20/19 5245)     Views established and blood work is ordered.  Patient is given IV fluids and Rocephin.  Urine cultures added.  Assessments & Plan (with Medical Decision Making)   Andres Sow is a 85 year old male who presents with complaints of urinary frequency and hematuria that started earlier today.  He does state that in the past he has had some mild urinary symptoms but not to this extent.  Denies any nausea or vomiting.  No fever or chills.  No flank pain.  No history of kidney stone or pyelonephritis.  No diarrhea or dysuria.  No injury to the penis.  No fall or back injury.  No rash over the penis.  Former smoker.  No treatment prior to arrival.  He apparently has had issues with urinary stream and was recently recommended to start Flomax for that.  He has not picked that up yet.  They did do a PSA which he states was normal.  Does not see the urologist.  On presentation patient was afebrile and vitally stable.  He had no CVA tenderness and a benign abdominal exam.  No penile lesion.  Urinalysis was grossly positive as above.  Urine culture is added.  Blood work was obtained and showed.  He was given IV fluids and IV Rocephin.  Doubt kidney stone or pyelonephritis.  Recently diagnosed with prostatic hypertrophy.  Cipro twice daily for 7 days.  Begin Flomax.  If symptoms persist or no improvement in your stream from Flomax follow-up with urology.  I have reviewed the nursing notes.    I have reviewed the findings, diagnosis, plan and need for follow up with the patient.           Medication List      Started    ciprofloxacin 500 MG tablet  Commonly known as:  CIPRO  500 mg, Oral, 2 TIMES DAILY            Final diagnoses:   Acute cystitis with hematuria       5/20/2019   [facility] EMERGENCY DEPARTMENT     Deepak Wisdom MD  05/20/19 2268       Deepak Wisdom MD  05/20/19 9555

## 2019-05-22 NOTE — TELEPHONE ENCOUNTER
"[facility] Emergency Department Lab result notification:    [place] ED lab result protocol used  Urine culture    Reason for call  Notify of lab results, assess symptoms,  review ED providers recommendations/discharge instructions (if necessary) and advise per ED lab result f/u protocol    Lab Result   Final urine culture report shows \"NO GROWTH\" and is NEGATIVE.  Emergency Dept discharge antibiotic: Ciprofloxacin (Cipro) 500 mg tablet, 1 tablet (500 mg) by mouth 2 times daily for 7 days.  Is ED discharge Rx antibiotic for UTI only (Yes/No): Yes  Recommendations per [place] ED Lab result protocol - Urine culture protocol.  Information table from ED Provider visit on 5/20/19  Symptoms reported at ED visit (Chief complaint, HPI)  UTI       frequency, hematuria      HPI  Andres Sow is a 85 year old male who presents with complaints of urinary frequency and hematuria that started earlier today.  He does state that in the past he has had some mild urinary symptoms but not to this extent.  Denies any nausea or vomiting.  No fever or chills.  No flank pain.  No history of kidney stone or pyelonephritis.  No diarrhea or dysuria.  No injury to the penis.  No fall or back injury.  No rash over the penis.  Former smoker.  No treatment prior to arrival.  He apparently has had issues with urinary stream and was recently recommended to start Flomax for that.  He has not picked that up yet.  They did do a PSA which he states was normal.  Does not see the urologist.     ED providers Impression and Plan (applicable information) Andres Sow is a 85 year old male who presents with complaints of urinary frequency and hematuria that started earlier today.  He does state that in the past he has had some mild urinary symptoms but not to this extent.  Denies any nausea or vomiting.  No fever or chills.  No flank pain.  No history of kidney stone or pyelonephritis.  No diarrhea or dysuria.  No injury to the penis. " " No fall or back injury.  No rash over the penis.  Former smoker.  No treatment prior to arrival.  He apparently has had issues with urinary stream and was recently recommended to start Flomax for that.  He has not picked that up yet.  They did do a PSA which he states was normal.  Does not see the urologist.  On presentation patient was afebrile and vitally stable.  He had no CVA tenderness and a benign abdominal exam.  No penile lesion.  Urinalysis was grossly positive as above.  Urine culture is added.  Blood work was obtained and showed.  He was given IV fluids and IV Rocephin.  Doubt kidney stone or pyelonephritis.  Recently diagnosed with prostatic hypertrophy.  Cipro twice daily for 7 days.  Begin Flomax.  If symptoms persist or no improvement in your stream from Flomax follow-up with urology.   Miscellaneous information NA     RN Assessment (Patient s current Symptoms), include time called.  [Insert Left message here if message left]  2:09: Spoke with Patient. He states that he is still having some bleeding with urination \"it's more dark looking.\" And still having urinary frequency. Denies fever, nausea, vomiting, pain with urination. Patient has questions regarding when the Flomax will start working.   RN Recommendations/Instructions per [place] ED lab result protocol  Patient notified of lab result and treatment recommendation.   Confirmed with Patient that he had not been on an antibiotic prior to the UA being collected in the ED.  Advised to stop the antibiotic as his urine is showing no growth.   Advised to follow up with his PCP regarding the ongoing symptoms.   Transferred to scheduling to make a clinic appointment, Patient will then be connected with a clinic nurse to ask his questions regarding his Flomax and talk about his ongoing symptoms.   Patient is comfortable with the plan of care and has no further questions or concerns.     Please Contact your PCP clinic or return to the Emergency " department if your:    Symptoms worsen or other concerning symptom's.    PCP follow-up Questions asked: YES       [RN Name]  Adeline Palacios RN  [facility] RN  Lung Nodule and ED Lab Result RN  Epic pool (ED late result f/u RN): [id number]  [facility] INCIDENTAL RADIOLOGY F/U NURSES: [id number]  # [phone]      Copy of Lab result   Urine Culture [[id number]] (Order [id number])   Exam Information     Exam Date Exam Time Accession # Results    5/20/19  9:15 PM [id number]    Specimen Information: Midstream Urine        Component Collected Lab   Specimen Description 05/20/2019  9:15    Midstream Urine    Special Requests 05/20/2019  9:15    Specimen received in preservative    Culture Micro 05/20/2019  9:15    No growth

## 2019-05-24 NOTE — ED AVS SNAPSHOT
[facility] Emergency Department  [address] [address]  Phone:  [phone]  Fax:  [phone]                                    Andres Sow   MRN: [id number]    Department:  [facility] Emergency Department   Date of Visit:  5/24/2019           After Visit Summary Signature Page    I have received my discharge instructions, and my questions have been answered. I have discussed any challenges I see with this plan with the nurse or doctor.    ..........................................................................................................................................  Patient/Patient Representative Signature      ..........................................................................................................................................  Patient Representative Print Name and Relationship to Patient    ..................................................               ................................................  Date                                   Time    ..........................................................................................................................................  Reviewed by Signature/Title    ...................................................              ..............................................  Date                                               Time          22EPIC Rev 08/18

## 2019-05-24 NOTE — ED PROVIDER NOTES
"  History     Chief Complaint   Patient presents with     Hematuria     seen last week in ED for hematuria and UTI sx, clinic is unable to place a cath and they are concerned about pt inability to void. Pt currently seeing Tata Farley in [facility] and is sending pt to ED for eval by private car.     HPI     Andres Sow is a 85 year old male who presents to the emergency department for evaluation of urinary retention. The patient was seen in the emergency department four days ago after he began experiencing hematuria and urinary frequency. The patient was started on a 7-day course of ciprofloxacin as well as Flomax. The patient was evaluated by Dr. Farley of the [facility] earlier today and was sent to the emergency department for further care after they were unable to place a catheter in clinic. The patient states that the hematuria \"tapered off\" since his emergency department visit four days ago, but he has continued to experience urinary frequency and feels like he has to urinate every 30 minutes. He acknowledges abdominal pain with urination which onset after his prior emergency department visit. The patient denies any flank pain, nausea, vomiting, difficulty breathing, or chest pain. He has had no surgeries on his prostate in the past.     At his visit on May 20 he was prescribed ciprofloxacin but he says he took only one dose because they called him and told him that his urine culture was negative      Allergies:  Allergies   Allergen Reactions     Hydrochlorothiazide      Low Potassium     Lisinopril      Neck swelling/airway       Problem List:    Patient Active Problem List    Diagnosis Date Noted     Aortic valve stenosis, etiology of cardiac valve disease unspecified 05/09/2019     Priority: Medium     Gout, unspecified cause, unspecified chronicity, unspecified site 05/09/2019     Priority: Medium     Essential hypertension, benign 05/09/2019     Priority: Medium     Advanced " directives, counseling/discussion 2018     Priority: Medium     Patient does not have an Advance/Health Care Directive (HCD), requests blank HCD form.    Lucita OLMEDO Miguelito  May 16, 2018         Hypertension goal BP (blood pressure) < 150/90 2017     Priority: Medium     Anxiety 2014     Priority: Medium     Colon polyps 2012     Priority: Medium     Benign tubular adenomatous polyps - repeat colon in 5 years       Bradycardia 2012     Priority: Medium     CKD (chronic kidney disease) stage 3, GFR 30-59 ml/min (H) 2012     Priority: Medium     Hyperlipidemia LDL goal <100 10/31/2010     Priority: Medium        Past Medical History:    Past Medical History:   Diagnosis Date     Hypertension      Unspecified vertiginous syndromes and labyrinthine disorders        Past Surgical History:    Past Surgical History:   Procedure Laterality Date     COLONOSCOPY  2007    Repeat colonoscopy in 3 years for surveillance     HC REMOVAL OF TONSILS,<11 Y/O         Family History:    Family History   Problem Relation Age of Onset     Hypertension Father      Breast Cancer Sister      Cerebrovascular Disease Sister        Social History:  Marital Status:   [5]  Social History     Tobacco Use     Smoking status: Former Smoker     Years: 25.00     Types: Cigarettes, Pipe, Cigars     Last attempt to quit: 1978     Years since quittin.4     Smokeless tobacco: Never Used   Substance Use Topics     Alcohol use: Yes     Alcohol/week: 36.0 oz     Comment: drinks 2 drinks daily     Drug use: No        Medications:      allopurinol (ZYLOPRIM) 100 MG tablet   amLODIPine (NORVASC) 10 MG tablet   aspirin (ASA) 81 MG EC tablet   LORazepam (ATIVAN) 0.5 MG tablet   losartan (COZAAR) 100 MG tablet   Multiple Vitamins-Minerals (PRESERVISION AREDS 2) CAPS   simvastatin (ZOCOR) 20 MG tablet   tamsulosin (FLOMAX) 0.4 MG capsule   VITAMIN D 1000 UNIT OR CAPS   VITAMIN-B COMPLEX PO TABS         Review  of Systems  All other systems are reviewed and are negative    Physical Exam   BP: 106/54  Pulse: 74  Heart Rate: 64  Temp: 97.3  F (36.3  C)  Resp: 16  Weight: 71.7 kg (158 lb)  SpO2: 96 %      Physical Exam  Nursing note and vitals were reviewed.  Constitutional: Awake and alert, chronically ill and pale appearing 85-year-old in no apparent discomfort, who does not appear acutely ill, and who answers questions appropriately and cooperates with examination.  HEENT: EOMI.   Neck: Freely mobile.  Pulmonary/Chest: Breathing is unlabored..  Abdomen: Soft, nontender, no HSM or masses rebound or guarding.  Bedside ultrasound shows his bladder has moderate amount of urine in it but is not severely distended and there appear to be some masses within the bladder attached to the bladder wall of uncertain significance.  Musculoskeletal: Extremities are warm and well-perfused and without edema  Neurological: Alert, oriented, thought content logical, coherent   Skin: Warm, dry, no rashes.  Psychiatric: Affect broad and appropriate.      ED Course        Procedures               Critical Care time:  none               Results for orders placed or performed during the hospital encounter of 05/24/19 (from the past 24 hour(s))   CBC with platelets differential   Result Value Ref Range    WBC 8.3 4.0 - 11.0 10e9/L    RBC Count 2.71 (L) 4.4 - 5.9 10e12/L    Hemoglobin 9.2 (L) 13.3 - 17.7 g/dL    Hematocrit 28.0 (L) 40.0 - 53.0 %     (H) 78 - 100 fl    MCH 33.9 (H) 26.5 - 33.0 pg    MCHC 32.9 31.5 - 36.5 g/dL    RDW 13.3 10.0 - 15.0 %    Platelet Count 188 150 - 450 10e9/L    Diff Method Automated Method     % Neutrophils 70.0 %    % Lymphocytes 14.0 %    % Monocytes 8.1 %    % Eosinophils 6.9 %    % Basophils 0.8 %    % Immature Granulocytes 0.2 %    Nucleated RBCs 0 0 /100    Absolute Neutrophil 5.8 1.6 - 8.3 10e9/L    Absolute Lymphocytes 1.2 0.8 - 5.3 10e9/L    Absolute Monocytes 0.7 0.0 - 1.3 10e9/L    Absolute Eosinophils  0.6 0.0 - 0.7 10e9/L    Absolute Basophils 0.1 0.0 - 0.2 10e9/L    Abs Immature Granulocytes 0.0 0 - 0.4 10e9/L    Absolute Nucleated RBC 0.0    Comprehensive metabolic panel   Result Value Ref Range    Sodium 143 133 - 144 mmol/L    Potassium 3.9 3.4 - 5.3 mmol/L    Chloride 110 (H) 94 - 109 mmol/L    Carbon Dioxide 21 20 - 32 mmol/L    Anion Gap 12 3 - 14 mmol/L    Glucose 94 70 - 99 mg/dL    Urea Nitrogen 14 7 - 30 mg/dL    Creatinine 1.28 (H) 0.66 - 1.25 mg/dL    GFR Estimate 51 (L) >60 mL/min/[1.73_m2]    GFR Estimate If Black 59 (L) >60 mL/min/[1.73_m2]    Calcium 9.1 8.5 - 10.1 mg/dL    Bilirubin Total 0.4 0.2 - 1.3 mg/dL    Albumin 3.3 (L) 3.4 - 5.0 g/dL    Protein Total 6.7 (L) 6.8 - 8.8 g/dL    Alkaline Phosphatase 57 40 - 150 U/L    ALT 21 0 - 70 U/L    AST 26 0 - 45 U/L   UA reflex to Microscopic   Result Value Ref Range    Color Urine Yellow     Appearance Urine Slightly Cloudy     Glucose Urine Negative NEG^Negative mg/dL    Bilirubin Urine Negative NEG^Negative    Ketones Urine 20 (A) NEG^Negative mg/dL    Specific Gravity Urine 1.012 1.003 - 1.035    Blood Urine Large (A) NEG^Negative    pH Urine 5.0 5.0 - 7.0 pH    Protein Albumin Urine 30 (A) NEG^Negative mg/dL    Urobilinogen mg/dL 0.0 0.0 - 2.0 mg/dL    Nitrite Urine Negative NEG^Negative    Leukocyte Esterase Urine Negative NEG^Negative    Source Catheterized Urine     RBC Urine >182 (H) 0 - 2 /HPF    WBC Urine 24 (H) 0 - 5 /HPF    Mucous Urine Present (A) NEG^Negative /LPF    Hyaline Casts 6 (H) 0 - 2 /LPF    Amorphous Crystals Few (A) NEG^Negative /HPF   Ferritin   Result Value Ref Range    Ferritin 91 26 - 388 ng/mL   Iron and iron binding capacity   Result Value Ref Range    Iron 22 (L) 35 - 180 ug/dL    Iron Binding Cap 198 (L) 240 - 430 ug/dL    Iron Saturation Index 11 (L) 15 - 46 %       Medications - No data to display      18:04 Patient assessed.  Tamez catheter was placed and drained 400 cc of pink-tinged gray urine.    Assessments &  Plan (with Medical Decision Making)     85-year-old male presented with urinary frequency and urgency with a history of recent gross hematuria and a recent urine culture did not grow any bacteria.  Today he had 400 cc of urine after attempts at voiding and so he is incompletely emptying his bladder.  He appears to have some type of masses within the bladder on bedside ultrasound.  A Tamez catheter was placed and his bladder was decompressed.  He was noted to have a hemoglobin of 10.8 4 days ago which is now down to 9.2 today.  Prior to that his last hemoglobin was in 2012 and was normal.  Certainly is possible that he is losing blood in the urine but this could be anemia of chronic disease.  The iron studies that were added on suggest this is more of an anemia of chronic disease.  He admitted to me that he is a fairly heavy drinker and he was noted to have an MCV that is elevated and so there may be a component of his alcohol use and his anemia and his macrocytosis.  At this time I do not think he needs hospitalization but he needs close follow-up in urology.  I spoke to the urologist on-call who concurs.  They will call Tuesday for an appointment.  I am concerned about his catheter becoming clogged and his falling hemoglobins and I think it would be best if he return in 2 days to the emergency department for recheck of his CBC and creatinine and his symptoms and to be sure his Tamez is functioning adequately.  Patient and his daughter are comfortable with this plan.  He certainly should return sooner if he develops abdominal pain, his catheter stops draining, he develops fever, flank pain, vomiting, or other new concerning symptoms.    I have reviewed the nursing notes.    I have reviewed the findings, diagnosis, plan and need for follow up with the patient.          Medication List      There are no discharge medications for this visit.         Final diagnoses:   Gross hematuria   Anemia, unspecified type        This document serves as a record of the services and decisions personally performed and made by Dhruv Palacios MD. It was created on HIS/HER behalf by Fang Olmedo, a trained medical scribe. The creation of this document is based the provider's statements to the medical scribe.  Fang Olmedo 6:16 PM 5/24/2019    Provider:   The information in this document, created by the medical scribe for me, accurately reflects the services I personally performed and the decisions made by me. I have reviewed and approved this document for accuracy prior to leaving the patient care area.  Dhruv Palacios MD 6:16 PM 5/24/2019 5/24/2019   [facility] EMERGENCY DEPARTMENT     Dhruv Palacios MD  05/24/19 [id number]

## 2019-05-24 NOTE — PATIENT INSTRUCTIONS
Our Clinic hours are:  Mondays    7:20 am - 7 pm  Tues -  Fri  7:20 am - 5 pm    Clinic Phone: [phone]    The clinic lab opens at 7:30 am Mon - Fri and appointments are required.    [facility]. [phone]  Monday  8 am - 7pm  Tues - Fri 8 am - 5:30 pm

## 2019-05-24 NOTE — ED NOTES
Pt comes to ED as advised by clinic.  Unable to obtain urine via cath in clinic.  Pt has been experiencing blood in urine.  Recently seen in ED as well.  MD at bedside to complete POC US.

## 2019-05-25 NOTE — DISCHARGE INSTRUCTIONS
Return to the emergency department Sunday morning for recheck of your symptoms and your blood tests.  Return to the emergency department sooner if you are having abdominal pain, your catheter stops draining, you develop fevers, vomiting, or other new concerning symptoms.

## 2019-05-26 NOTE — DISCHARGE INSTRUCTIONS
ICD-10-CM    1. Aortic valve stenosis, etiology of cardiac valve disease unspecified I35.0 CBC with platelets, differential     Basic metabolic panel     Echocardiogram Complete    this appears worse by exam today.  obtain echo (ordered) and discuss results in clinic   2. Hematuria, unspecified type R31.9     no further drop in hgb today.  creatinine also looks unchanged,  follow-up clinic related to anemia   3. Urinary retention with incomplete bladder emptying R33.9     fopllow-up urology for urine retention.   4. Anemia, unspecified type D64.9

## 2019-05-26 NOTE — ED AVS SNAPSHOT
[facility] Emergency Department  [address] [address]  Phone:  [phone]  Fax:  [phone]                                    Andres Sow   MRN: [id number]    Department:  [facility] Emergency Department   Date of Visit:  5/26/2019           After Visit Summary Signature Page    I have received my discharge instructions, and my questions have been answered. I have discussed any challenges I see with this plan with the nurse or doctor.    ..........................................................................................................................................  Patient/Patient Representative Signature      ..........................................................................................................................................  Patient Representative Print Name and Relationship to Patient    ..................................................               ................................................  Date                                   Time    ..........................................................................................................................................  Reviewed by Signature/Title    ...................................................              ..............................................  Date                                               Time          22EPIC Rev 08/18

## 2019-05-26 NOTE — ED PROVIDER NOTES
History     Chief Complaint   Patient presents with     Wound Check     light recheck     HPI  Andres Sow is a 85 year old male who resents with follow-up after he was seen 2 days ago in this emergency department for urinary retention and gross hematuria.  He required Light catheter placement after finding 400 cc residual.  He is done well with the catheter in place since that time tolerating this with no abdominal pain.  At the time of his evaluation he had a urinalysis without nitrite or leukocyte esterase and with white cells but primarily red blood cells in the urine.  He has had persistent blood in the urine since that time.  He is not on anticoagulation but he is on aspirin.  He has no flank pain.  He has not felt lightheaded woozy or weak or malaise.  No malaise.  No other GI or  symptoms with this.     He was recommended for reevaluation today by Dr. Paalcios who saw him 2 days ago and noted a decreased in renal function from baseline as well as a hemoglobin drop of 1 to 2 g since his prior.    Allergies:  Allergies   Allergen Reactions     Hydrochlorothiazide      Low Potassium     Lisinopril      Neck swelling/airway       Problem List:    Patient Active Problem List    Diagnosis Date Noted     Aortic valve stenosis, etiology of cardiac valve disease unspecified 05/09/2019     Priority: Medium     Gout, unspecified cause, unspecified chronicity, unspecified site 05/09/2019     Priority: Medium     Essential hypertension, benign 05/09/2019     Priority: Medium     Advanced directives, counseling/discussion 05/16/2018     Priority: Medium     Patient does not have an Advance/Health Care Directive (HCD), requests blank HCD form.    Lucita Farley  May 16, 2018         Hypertension goal BP (blood pressure) < 150/90 03/29/2017     Priority: Medium     Anxiety 06/17/2014     Priority: Medium     Colon polyps 08/01/2012     Priority: Medium     Benign tubular adenomatous polyps - repeat colon in 5  years       Bradycardia 2012     Priority: Medium     CKD (chronic kidney disease) stage 3, GFR 30-59 ml/min (H) 2012     Priority: Medium     Hyperlipidemia LDL goal <100 10/31/2010     Priority: Medium        Past Medical History:    Past Medical History:   Diagnosis Date     Hypertension      Unspecified vertiginous syndromes and labyrinthine disorders        Past Surgical History:    Past Surgical History:   Procedure Laterality Date     COLONOSCOPY  2007    Repeat colonoscopy in 3 years for surveillance     HC REMOVAL OF TONSILS,<11 Y/O         Family History:    Family History   Problem Relation Age of Onset     Hypertension Father      Breast Cancer Sister      Cerebrovascular Disease Sister        Social History:  Marital Status:   [5]  Social History     Tobacco Use     Smoking status: Former Smoker     Years: 25.00     Types: Cigarettes, Pipe, Cigars     Last attempt to quit: 1978     Years since quittin.4     Smokeless tobacco: Never Used   Substance Use Topics     Alcohol use: Yes     Alcohol/week: 36.0 oz     Comment: drinks 2 drinks daily     Drug use: No        Medications:      allopurinol (ZYLOPRIM) 100 MG tablet   amLODIPine (NORVASC) 10 MG tablet   aspirin (ASA) 81 MG EC tablet   LORazepam (ATIVAN) 0.5 MG tablet   losartan (COZAAR) 100 MG tablet   Multiple Vitamins-Minerals (PRESERVISION AREDS 2) CAPS   simvastatin (ZOCOR) 20 MG tablet   tamsulosin (FLOMAX) 0.4 MG capsule   VITAMIN D 1000 UNIT OR CAPS   VITAMIN-B COMPLEX PO TABS         Review of Systems   Constitutional: Negative for chills, diaphoresis and fever.   HENT: Negative for ear pain, sinus pressure and sore throat.    Eyes: Negative for visual disturbance.   Respiratory: Negative for cough, shortness of breath and wheezing.    Cardiovascular: Negative for chest pain and palpitations.   Gastrointestinal: Negative for abdominal pain, blood in stool, constipation, diarrhea, nausea and vomiting.  "  Genitourinary: Positive for hematuria. Negative for dysuria, frequency and urgency.   Skin: Negative for rash.   Neurological: Negative for headaches.   All other systems reviewed and are negative.      Physical Exam   BP: 100/59  Heart Rate: 69  Temp: 98.5  F (36.9  C)  Resp: 16  Height: 175.3 cm (5' 9\")  Weight: 71.7 kg (158 lb)  SpO2: 96 %      Physical Exam   HENT:   Mouth/Throat: Oropharynx is clear and moist.   Eyes: Conjunctivae are normal.   Neck: Neck supple.   Cardiovascular: Normal rate and regular rhythm.   Murmur heard.   Systolic murmur is present with a grade of 5/6.  Pulmonary/Chest: Effort normal and breath sounds normal. No stridor. No respiratory distress. He has no wheezes.   Abdominal: He exhibits no distension and no mass. There is no tenderness. There is no guarding.   Musculoskeletal: He exhibits no edema.   Neurological: He is alert. He exhibits normal muscle tone.   Skin: No rash noted. He is not diaphoretic. No pallor.       ED Course        Procedures               Critical Care time:  none               Results for orders placed or performed during the hospital encounter of 05/26/19 (from the past 24 hour(s))   POC US ABDOMEN LIMITED    Impression    [facility] Procedure Note      Limited Bedside ED Ultrasound of the Urinary Bladder:    PERFORMED BY: Dr. Jamison Jules  INDICATIONS:  Hematuria  PROBE: Low frequency convex probe  BODY LOCATION:  Abdomen  FINDINGS:  Visualization of the bladder in longitudinal and transverse views demonstrated a contracted state.    INTERPRETATION:   The evaluation was normal without evidence of obstruction or mass.  No bladder distention noted.  IMAGE DOCUMENTATION: Images were archived to PACs system.      [facility] Procedure Note    Limited Bedside ED Renal Ultrasound:    PERFORMED BY: Dr. Jamison Jules  INDICATIONS:  Hematuria  PROBE: Low frequency convex probe  BODY LOCATION:  Abdomen  FINDINGS:  The ultrasound was performed with " longitudinal   Right Kidney:   Hydronephrosis:  None   Renal cyst:  None  Left Kidney:   Hydronephrosis:  None   Renal cyst:  None  INTERPRETATION:  The evaluation of the kidneys was normal without evidence of hydronephrosis or cysts.  IMAGE DOCUMENTATION: Images were archived to PACs system.     CBC with platelets, differential   Result Value Ref Range    WBC 9.2 4.0 - 11.0 10e9/L    RBC Count 2.73 (L) 4.4 - 5.9 10e12/L    Hemoglobin 9.4 (L) 13.3 - 17.7 g/dL    Hematocrit 28.1 (L) 40.0 - 53.0 %     (H) 78 - 100 fl    MCH 34.4 (H) 26.5 - 33.0 pg    MCHC 33.5 31.5 - 36.5 g/dL    RDW 13.3 10.0 - 15.0 %    Platelet Count 179 150 - 450 10e9/L    Diff Method Automated Method     % Neutrophils 70.5 %    % Lymphocytes 10.0 %    % Monocytes 8.6 %    % Eosinophils 9.7 %    % Basophils 0.9 %    % Immature Granulocytes 0.3 %    Nucleated RBCs 0 0 /100    Absolute Neutrophil 6.5 1.6 - 8.3 10e9/L    Absolute Lymphocytes 0.9 0.8 - 5.3 10e9/L    Absolute Monocytes 0.8 0.0 - 1.3 10e9/L    Absolute Eosinophils 0.9 (H) 0.0 - 0.7 10e9/L    Absolute Basophils 0.1 0.0 - 0.2 10e9/L    Abs Immature Granulocytes 0.0 0 - 0.4 10e9/L    Absolute Nucleated RBC 0.0    Basic metabolic panel   Result Value Ref Range    Sodium 143 133 - 144 mmol/L    Potassium 3.7 3.4 - 5.3 mmol/L    Chloride 110 (H) 94 - 109 mmol/L    Carbon Dioxide 19 (L) 20 - 32 mmol/L    Anion Gap 14 3 - 14 mmol/L    Glucose 82 70 - 99 mg/dL    Urea Nitrogen 15 7 - 30 mg/dL    Creatinine 1.44 (H) 0.66 - 1.25 mg/dL    GFR Estimate 44 (L) >60 mL/min/[1.73_m2]    GFR Estimate If Black 51 (L) >60 mL/min/[1.73_m2]    Calcium 9.0 8.5 - 10.1 mg/dL       Medications - No data to display    Assessments & Plan (with Medical Decision Making)     MDM: Andres Sow is a 85 year old male who presented with recent urinary obstruction and had been directed for recheck today with repeat hemoglobin and chem  panel  Due to findings on last evaluation.  Tamez catheter appears to be  functioning properly and repeat hemoglobin and chemistry panel are reassuring.  We discussed findings and additional management as below.  I incidentally heard a fairly loud systolic murmur at the left sternal border and I recommended he follow-up for echocardiogram.  Order is placed.  I suspect this is a progression of his aortic stenosis and the murmur sounds quite loud and may be more critical at this point  I have reviewed the nursing notes.    I have reviewed the findings, diagnosis, plan and need for follow up with the patient.          Medication List      There are no discharge medications for this visit.         Final diagnoses:   Aortic valve stenosis, etiology of cardiac valve disease unspecified - this appears worse by exam today.  obtain echo (ordered) and discuss results in clinic   Hematuria, unspecified type - no further drop in hgb today.  creatinine also looks unchanged,  follow-up clinic related to anemia   Urinary retention with incomplete bladder emptying - fopllow-up urology for urine retention.   Anemia, unspecified type       5/26/2019   [facility] EMERGENCY DEPARTMENT     Jamison Jules MD  05/26/19 5146

## 2019-05-28 NOTE — TELEPHONE ENCOUNTER
Called pt phone number x 3 and call did not go to voice mail, line just went blank.  Currently pt is scheduled to see Dr. Vega on June 3rd at 1:30pm.    Francesca Strong  [facility] RN

## 2019-05-28 NOTE — PROGRESS NOTES
Clinic Care Coordination Contact    Clinic Care Coordination Contact  OUTREACH    Referral Information:  Referral Source: ED Follow-Up    Primary Diagnosis: Genitourinary Disorders    Chief Complaint   Patient presents with     ER F/U     Nurse: ER 5/24/19 & 5/26/19        Universal Utilization: Pt has had 3 ED/Hospital visits in past 90 days;  Pt did trigger high for risk of readmission-ED/hospital utilization.  Clinic Utilization  Difficulty keeping appointments:: No  Compliance Concerns: No  No-Show Concerns: No  No PCP office visit in Past Year: No  Utilization    Last refreshed: 5/28/2019  9:32 AM:  Hospital Admissions 0           Last refreshed: 5/28/2019  9:32 AM:  ED Visits 3           Last refreshed: 5/28/2019  9:32 AM:  No Show Count (past year) 0              Current as of: 5/28/2019  9:32 AM              Clinical Concerns:  Care Coordinator RN spoke with patient as he has been in the ER twice in two days 5/24/19 and 5/26/19 for gross hematuria. He has a light catheter in place which he is requesting longer tubing for. He also has a message into [facility] to see about getting in earlier than 3 weeks with Dr. Vega. He states he was told that if he does not hear anything by 1pm to call back the speciality clinic. He denies having any questions or concerns at this time. He states last night he had more blood in his urine but today its clear. He states he did notice one clot yesterday.    Current Medical Concerns:    Patient Active Problem List   Diagnosis     Hyperlipidemia LDL goal <100     Bradycardia     Colon polyps     Anxiety     Hypertension goal BP (blood pressure) < 150/90     Advanced directives, counseling/discussion     CKD (chronic kidney disease) stage 3, GFR 30-59 ml/min (H)     Aortic valve stenosis, etiology of cardiac valve disease unspecified     Gout, unspecified cause, unspecified chronicity, unspecified site     Essential hypertension, benign         Current Behavioral  Concerns: Denies having any concerns.      Education Provided to patient: RN CC educated about Care Coordination Services, discharge instructions, medications reviewed and follow up.      Pain  Pain (GOAL):: No  Health Maintenance Reviewed: Due/Overdue   Health Maintenance Due   Topic Date Due     ZOSTER IMMUNIZATION (2 of 3) 12/04/2013       Clinical Pathway: None    Medication Management:  Patient independent in medication management and verbalizes adherence and understanding of medication regimen.        Functional Status:  Dependent ADLs:: Independent  Dependent IADLs:: Independent, Transportation  Bed or wheelchair confined:: No  Mobility Status: Independent  Fallen 2 or more times in the past year?: No  Any fall with injury in the past year?: No    Living Situation:  Current living arrangement:: I live alone  Type of residence:: [facility]    Diet/Exercise/Sleep:  Diet:: Regular  Inadequate nutrition (GOAL):: No  Food Insecurity: No  Tube Feeding: No  Exercise:: Yes  Inadequate activity/exercise (GOAL):: No  Significant changes in sleep pattern (GOAL): No    Transportation:  Transportation concerns (GOAL):: No  Transportation means:: Family, Regular car     Psychosocial:  Denominational or spiritual beliefs that impact treatment:: No  Mental health DX:: Yes  Mental health DX how managed:: Medication  Mental health management concern (GOAL):: No  Informal Support system:: Children, Family, Friends, Neighbors     Financial/Insurance:   Financial/Insurance concerns (GOAL):: No       Resources and Interventions:  Current Resources: RN CC mailed out to patient intro letter, access care plan, consent to communicate form and care coordination services brochure.  Community Resources: None  Supplies used at home:: None  Equipment Currently Used at Home: none    Advance Care Plan/Directive  Advanced Care Plans/Directives on file:: No    Referrals Placed: None       Future Appointments              In 1 week Dimitris  Eveline Schultz MD [facility]    In 3 weeks LANEY Vega MD [facility]          Plan:   No further Care Coordination needs identified at this time. Patient may be referred to Care Coordination in the future if additional needs arise.  Pt encouraged to contact Care Coordinator through the clinic if situation changes and assistance is needed. No follow-up planned.    Ronda KULKARNI RN, PHN, [facility]  Primary Care Clinic RN Care Coordinator  [facility]   [email or web address]  Office:  [phone]

## 2019-05-28 NOTE — TELEPHONE ENCOUNTER
Pt has msg into Urology clinic, to see  sooner.  Pt also needing to have longer light catheter - tubing - so he can hang the light bag on his belt loop or a leg bag to attach to his leg, so he can get around the house.  Pt asking if he can see someone/RN to do this in the Urology Clinic today?  Please call pt to advise.  Michael

## 2019-05-28 NOTE — TELEPHONE ENCOUNTER
Pt calling to check on status of getting in earlier.     Also, he has a catheter in now and states his tubing is too short - wants to know if he can come in to have this changed this afternoon? - Please advise      Denise Behrendt  Specialty CSS

## 2019-05-28 NOTE — LETTER
Health Care Home - Access Care Plan    About Me:    Patient Name:  Andres Sow    YOB: 1933  Age:                      85 year old   [place] MRN:     [id number] Telephone Information:   Home Phone [phone]   Mobile Not on file.       Address:  [address] [place] [id number] Email address:  No e-mail address on record      Emergency Contact(s)   Name Relationship Lgl Grd Work Phone Home Phone Mobile Phone   1. JANNET CHI Daughter   [phone]    2. GIOVANA SÁNCHEZ Daughter   [phone]              Health Maintenance: Routine Health maintenance Reviewed: Due/Overdue   Health Maintenance Due   Topic Date Due     ZOSTER IMMUNIZATION (2 of 3) 12/04/2013     Pt to talk with provider about what is needed or can continue wait.        My Access Plan  Medical Emergency 916   Questions or concerns during clinic hours Primary Clinic Line, I will call the clinic directly: [facility] - [phone]   24 Hour Appointment Line [phone] or  [phone]) (toll free)   24 Hour Nurse Line [phone] (toll free)   Questions or concerns outside clinic hours 24 Hour Appointment Line, I will call the after-hours on-call line:   [facility] [phone] or [phone]) (toll-free)   Preferred Urgent Care [facility] [phone]   Preferred [facility] [place] [place]  [phone]   Preferred Pharmacy [id number] - [place], [place] - [address]     Behavioral Health Crisis Line The National Suicide Prevention Lifeline at 1-558.859.9409 or 910                     My Care Team Members  Patient Care Team       Relationship Specialty Notifications Start End    Eveline Shanks MD PCP - General Family Practice  5/16/18     Phone: [phone] Fax: [phone] [address] [facility] [address]    Eveline Shanks MD Assigned PCP   4/26/19     Phone:  [phone] Fax: [phone] [address] [facility] [address]    Ronda Butts, RN Clinic Care Coordinator Primary Care - CC  5/28/19 5/28/19    Phone: [phone] Fax: [phone]               My Medical and Care Information  Problem List   Patient Active Problem List   Diagnosis     Hyperlipidemia LDL goal <100     Bradycardia     Colon polyps     Anxiety     Hypertension goal BP (blood pressure) < 150/90     Advanced directives, counseling/discussion     CKD (chronic kidney disease) stage 3, GFR 30-59 ml/min (H)     Aortic valve stenosis, etiology of cardiac valve disease unspecified     Gout, unspecified cause, unspecified chronicity, unspecified site     Essential hypertension, benign      Current Medications and Allergies:  See printed Medication Report

## 2019-05-28 NOTE — TELEPHONE ENCOUNTER
I spoke to Andres today and said that we will see him with Dr Vega on Monday June 3rd with Dr Vega for a cysto and trail void.  He also wanted us to get some type of device so that he can hang his bed bag on his waist band. I discussed in detail that we need the urine to drain down below his bladder. I encouraged him to come in so that we can place the leg bag that they had given him at the ER and to teach him how to switch between the night bag and the leg bag. He refused. I encouraged him not hang his night bag up high or above the bladder. I asked him to call us back if he had any other concerns or if he decides to have up help him with his leg bag. India BUENROSTRO Rn

## 2019-05-28 NOTE — LETTER
[place] CARE COORDINATION  [facility]  [address] [place] [address]  [phone]    May 28, 2019    Andres Sow  [address] [place] [id number]      Dear Andres,    I am covering for the Clinic Care Coordinator that works with primary care provider's clinic. I recently tried to reach you but was unable to, to introduce you to the Care Coordination Program.  I wanted to provide you with my contact information for you to be able to call me with any questions or concerns. I also wanted to provide you with more information about care coordination.     The Clinic Care Coordinator role is a Registered Nurse and/or  who understands the health care system. The goal of Clinic Care Coordination is to help you manage your health and improve access to the [facility] system in the most efficient manner.  The Registered Nurse can assist you in meeting your health care goals by providing education, coordinating services, and strengthening the communication among your providers. The  can assist you with financial, behavioral, psychosocial, and chemical dependency and counseling/psychiatric resources.    Your primary care Clinic Care Coordinator is Jerry Payne RN, he can be reached at [phone]  or [phone] when he returns tomorrow 5/29/19 for any future questions or concerns, in the mean time please feel free to contact me with any questions or concerns at [phone] as I am covering until Jerry returns to the office. We at [facility] are focused on providing you with the highest-quality healthcare experience possible and that all starts with you.     Sincerely,         Ronda KULKARNI, RN, PHN, CCM on behalf of Jerry Payne RN at [phone] or [phone]  Primary Care Clinic RN Care Coordinator  [facility]   [email or web address]  Office:  [phone]      Enclosed: I have enclosed a copy of a 24 Hour Access Plan. This  "has helpful phone numbers for you to call when needed. Please keep this in an easy to access place to use as needed.  I have sent a Consent to Communicate form for you to complete (as you wish) to allow us to discuss/share your personal health information with whomever you choose on your behalf.   I have highlighted the areas for you to review/address.  Please complete all that apply.  Please check the box for medical information if you allow the clinic to share personal health information with whomever you choose.  This form must be signed and dated to be valid.  If you check all boxes, please be aware that checking \"nothing\" despite checking other boxes will allow no information to be shared.      If you wish not to have information regarding your mental health, chemical health, AIDS/HIV or sickle cell anemia, then please initial the second bullet point at the end of the form.  If you allow this information to be shared with those noted above on the form, then leave this area blank and do not initial.      This form does not , you can make a change to this document at any time.      "

## 2019-05-28 NOTE — TELEPHONE ENCOUNTER
Reason for Call:  Other appointment    Detailed comments: pt calling starting he has blood in his urine x 1 week. No other symptoms. Would like to get in sooner then 3 weeks.     Phone Number Patient can be reached at: Home number on file [phone] (home)    Best Time: any     Can we leave a detailed message on this number? YES    Call taken on 5/28/2019 at 8:18 AM by Aditi You

## 2019-05-31 NOTE — TELEPHONE ENCOUNTER
Reason for call:  Patient reporting a symptom    Symptom or request: swelling of feet and ankles noticed today. Pt ask if this is to do with the antibiotic he finished today    Duration (how long have symptoms been present): today    Have you been treated for this before? No    Additional comments:     Phone Number patient can be reached at:  Home number on file [phone] (home)    Best Time:  any    Can we leave a detailed message on this number:  YES    Call taken on 5/31/2019 at 3:54 PM by Amparo [place]

## 2019-06-03 NOTE — NURSING NOTE
"Chief Complaint   Patient presents with     Consult For     gross hematuria       Initial /63 (BP Location: Right arm, Patient Position: Chair, Cuff Size: Adult Regular)   Pulse 67   Resp 16   Ht 1.753 m (5' 9\")   Wt 71.7 kg (158 lb)   SpO2 96%   BMI 23.33 kg/m   Estimated body mass index is 23.33 kg/m  as calculated from the following:    Height as of this encounter: 1.753 m (5' 9\").    Weight as of this encounter: 71.7 kg (158 lb).  BP completed using cuff size: regular  Medications and allergies reviewed.      Jennifer MCCRAY MA    "

## 2019-06-03 NOTE — PATIENT INSTRUCTIONS
Per physician instructions      If you have questions or concerns on any instructions given to you by your provider today or if you need to schedule an appointment, you can reach us at [phone].

## 2019-06-03 NOTE — NURSING NOTE
Pt brought back to the procedure room for a cystoscopy per Dr. Vega Informed consent obtained, pt prepped in a sterile manner and a uro jet was used.      Scope serial number: [id number] Cheri MCCRAY MA

## 2019-06-03 NOTE — NURSING NOTE
Catheter insertion documentation on 6/3/2019:    Andres Sow presents to the clinic for catheter insertion that was completed by Dr Vega.  Reason for insertion: bloody urine and urinary retention  Order has been verified.   Catheter successfully inserted into the urethral meatus in the usual sterile fashion without immediate complication.  Type of catheter placed: 16 Bengali Coude catheter  Urine is red in color.  30 cc's of urine output returned.  Balloon was filled with 10cc's of normal saline.  Securement device placed for the catheter.  The patient tolerated the procedure and was instructed to monitor for catheter dysfunction, monitor for pain or discomfort, return or call for pain, fever, leakage or decreased urine flow and watch for signs of infection    Jennifer MCCRAY MA

## 2019-06-03 NOTE — NURSING NOTE
Active order to obtain bladder scan? Yes   Name of ordering provider:  Dr Vega  Bladder scan preformed post void Yes 100ML  Bladder scan reveled 109ML  Provider notified?  Yes    Jennifer MCCRAY MA

## 2019-06-05 NOTE — PATIENT INSTRUCTIONS
Our Clinic hours are:  Mondays    7:20 am - 7 pm  Tues -  Fri  7:20 am - 5 pm    Clinic Phone: [phone]    The clinic lab opens at 7:30 am Mon - Fri and appointments are required.    [facility]. [phone]  Monday  8 am - 7pm  Tues - Fri 8 am - 5:30 pm

## 2019-06-05 NOTE — PROGRESS NOTES
Subjective     Andres Sow is a 85 year old male who presents to clinic today for the following health issues:    HPI   Problem:   Chief Complaint   Patient presents with     ER F/U     Was seen at [facility] ED 5/24/19 & 5/2/19.  Saw the Urologist 6/3/19.     ADDITIONAL HPI: 85 year old male here for above issue.  He's wondering with his really excellent cholesterol at last lab if he could stop his simvastatin?    ROS: 10 point review of systems negative except as per HPI.    PAST MEDICAL HISTORY:  Past Medical History:   Diagnosis Date     Hypertension      Unspecified vertiginous syndromes and labyrinthine disorders     seen in ED after starting metoprolol; continued on it without adverse reaction        ACTIVE MEDICAL PROBLEMS:  Patient Active Problem List   Diagnosis     Hyperlipidemia LDL goal <100     Bradycardia     Colon polyps     Anxiety     Hypertension goal BP (blood pressure) < 150/90     Advanced directives, counseling/discussion     CKD (chronic kidney disease) stage 3, GFR 30-59 ml/min (H)     Aortic valve stenosis, etiology of cardiac valve disease unspecified     Gout, unspecified cause, unspecified chronicity, unspecified site     Essential hypertension, benign        FAMILY HISTORY:  Family History   Problem Relation Age of Onset     Hypertension Father      Breast Cancer Sister      Cerebrovascular Disease Sister        SOCIAL HISTORY:  Social History     Socioeconomic History     Marital status:      Spouse name: Not on file     Number of children: Not on file     Years of education: Not on file     Highest education level: Not on file   Occupational History     Not on file   Social Needs     Financial resource strain: Not on file     Food insecurity:     Worry: Not on file     Inability: Not on file     Transportation needs:     Medical: Not on file     Non-medical: Not on file   Tobacco Use     Smoking status: Former Smoker     Years: 25.00     Types: Cigarettes, Pipe,  Cigars     Last attempt to quit: 1978     Years since quittin.4     Smokeless tobacco: Never Used   Substance and Sexual Activity     Alcohol use: Yes     Alcohol/week: 36.0 oz     Comment: drinks 2 drinks daily     Drug use: No     Sexual activity: Not Currently   Lifestyle     Physical activity:     Days per week: Not on file     Minutes per session: Not on file     Stress: Not on file   Relationships     Social connections:     Talks on phone: Not on file     Gets together: Not on file     Attends Church service: Not on file     Active member of club or organization: Not on file     Attends meetings of clubs or organizations: Not on file     Relationship status: Not on file     Intimate partner violence:     Fear of current or ex partner: Not on file     Emotionally abused: Not on file     Physically abused: Not on file     Forced sexual activity: Not on file   Other Topics Concern     Parent/sibling w/ CABG, MI or angioplasty before 65F 55M? No   Social History Narrative     Not on file       MEDICATIONS:  Current Outpatient Medications   Medication Sig Dispense Refill     allopurinol (ZYLOPRIM) 100 MG tablet Take 1 tablet (100 mg) by mouth daily 90 tablet 3     amLODIPine (NORVASC) 10 MG tablet Take 1 tablet (10 mg) by mouth daily 90 tablet 3     aspirin (ASA) 81 MG EC tablet Take 81 mg by mouth daily       LORazepam (ATIVAN) 0.5 MG tablet Take 1 tablet (0.5 mg) by mouth 2 times daily as needed for anxiety 20 tablet 0     losartan (COZAAR) 100 MG tablet Take 1 tablet (100 mg) by mouth daily 90 tablet 3     Multiple Vitamins-Minerals (PRESERVISION AREDS 2) CAPS Take 1 capsule by mouth 2 times daily        VITAMIN D 1000 UNIT OR CAPS 2 CAPSULE BY MOUTH ONCE DAILY         ALLERGIES:     Allergies   Allergen Reactions     Hydrochlorothiazide      Low Potassium     Lisinopril      Neck swelling/airway       Problem list, Medication list, Allergies, and Medical/Social/Surgical histories reviewed in EPIC  "and updated as appropriate.    OBJECTIVE:                                                    VITALS: /58 (BP Location: Right arm, Cuff Size: Adult Regular)   Pulse 66   Temp 97.4  F (36.3  C) (Tympanic)   Resp 16   Ht 1.753 m (5' 9\")   Wt 71.7 kg (158 lb)   SpO2 95%   BMI 23.33 kg/m   Body mass index is 23.33 kg/m .  GENERAL: Pleasant, well appearing male.  : light in place and draining normally.    ASSESSMENT/PLAN:                                                    1. Urinary retention  Started flomax. Has follow-up with urology. Doing well with light.    2. Hyperlipidemia LDL goal <100  No history of MI or CVA. Ok to stop simvastatin.  Re-check lipids at next physical.      "

## 2019-06-18 NOTE — NURSING NOTE
Andres Sow was brought back to the procedure room for a trial void per Dr. Vega. 300 cc of sterile water was instilled into his bladder. 10 cc fluid was removed from his catheter balloon and a 16fr coude light catheter was removed. The patient was able to urinate 250 cc.     Margarito BERNAL CMA

## 2019-06-18 NOTE — PATIENT INSTRUCTIONS
Per Physician's instructions.      If you have questions or concerns on any instructions given to you by your provider today or if you need to schedule an appointment, you can reach us at [phone].     Thank you!              \

## 2019-06-18 NOTE — NURSING NOTE
"Initial /68 (BP Location: Right arm, Patient Position: Sitting, Cuff Size: Adult Regular)   Pulse 67   Temp 98.2  F (36.8  C) (Tympanic)   Wt 71.7 kg (158 lb)   BMI 23.33 kg/m   Estimated body mass index is 23.33 kg/m  as calculated from the following:    Height as of 6/5/19: 1.753 m (5' 9\").    Weight as of this encounter: 71.7 kg (158 lb). .    Margarito BERNAL CMA    "

## 2019-06-18 NOTE — PROGRESS NOTES
Appointment source: Established Patient  Patient name: Andres Sow  Urology Staff: Robert Vega MD    Subjective: This is a 85 year old year old male returning for follow up of urinary retention.    Had cystoscopy for evaluation of hematuria that was sub optimal secondary to hematuria related diminished visibility but no obvious lesions were noted.    No upper tract studies have been done. ER ultrasound performed of the bladder.    Objective:  Passed voiding trial. Catheter bladder drainage was therefore discontinued.    Urine culture was sent today.    Assessment:  Resolution of urinary retention.    Plan:  Continue tamsulosin and return for follow up in 1 month.    Will consider the need for additional hematuria evaluation at that time.    Total time 20 minutes, counseling 15 minutes discussing management of urinary retention.

## 2019-06-19 NOTE — TELEPHONE ENCOUNTER
Mild blood in urine post catheter removal can leave irritation. We are also awaiting UC results.  Pt notes a tingling sensation in his urethra as well.    Advised push fluids and we will talk again when the results are available.    Penny Meza RN  [facility]

## 2019-06-19 NOTE — TELEPHONE ENCOUNTER
Reason for Call:  Other call back     Detailed comments: Pt states he was in yesterday to have a catheter removed.  Last night and this morning has had blood in his urine.    Phone Number Patient can be reached at: Home number on file [phone] (home)    Best Time: any    Can we leave a detailed message on this number? NO    Call taken on 6/19/2019 at 12:46 PM by Ayse Calixto

## 2019-06-20 NOTE — TELEPHONE ENCOUNTER
Abx sent per Dr Vega Result Note.  To [facility].  No Answer no voicemail, unable to leave a message.    Penny Meza RN  [facility]

## 2019-06-20 NOTE — TELEPHONE ENCOUNTER
Cancelled prescription at [facility] and new prescription sent to [facility].   Pt advised.  Penny Meza RN  [facility]

## 2019-07-13 NOTE — TELEPHONE ENCOUNTER
Patient calling. States he has been dealing with blood in urine for the past few months. He sees urology on Wednesday. No new symptoms, no problem passing urine, no fever or other symptoms. He said he briefly had no blood in urine that he noticed and now he noticed it is back again. Is it OK to wait for his appointment until Wednesday? Advised that if no new or concerning symptoms he should be OK to wait for his appointment. No other questions.  Marita Hendricks RN  [facility]

## 2019-07-13 NOTE — TELEPHONE ENCOUNTER
Reason for Disposition    All other urine symptoms    Additional Information    Negative: Shock suspected (e.g., cold/pale/clammy skin, too weak to stand, low BP, rapid pulse)    Negative: Sounds like a life-threatening emergency to the triager    Negative: Followed a genital area injury    Negative: Followed a genital area injury (penis, scrotum)    Negative: Vaginal discharge    Negative: Pus (white, yellow) or bloody discharge from end of penis    Negative: [1] Taking antibiotic for urinary tract infection (UTI) AND [2] female    Negative: [1] Taking antibiotic for urinary tract infection (UTI) AND [2] male    Negative: [1] Discomfort (pain, burning or stinging) when passing urine AND [2] pregnant    Negative: [1] Discomfort (pain, burning or stinging) when passing urine AND [2] postpartum < 1 month    Negative: [1] Discomfort (pain, burning or stinging) when passing urine AND [2] female    Negative: [1] Discomfort (pain, burning or stinging) when passing urine AND [2] male    Negative: Pain or itching in the vulvar area    Negative: Pain in scrotum is main symptom    Negative: Blood in the urine is main symptom    Negative: Symptoms arising from use of a urinary catheter (Tamez or Coude)    Negative: [1] Unable to urinate (or only a few drops) > 4 hours AND     [2] bladder feels very full (e.g., palpable bladder or strong urge to urinate)    Negative: [1] Decreased urination and [2] drinking very little AND [2] dehydration suspected (e.g., dark urine, no urine > 12 hours, very dry mouth, very lightheaded)    Negative: Patient sounds very sick or weak to the triager    Negative: Fever > 100.5 F (38.1 C)    Negative: Side (flank) or lower back pain present    Negative: [1] Can't control passage of urine (i.e., urinary incontinence) AND [2] new onset (< 2 weeks) or worsening    Negative: Urinating more frequently than usual (i.e., frequency)    Negative: Bad or foul-smelling urine    Negative: Has to get out of  "bed to urinate > 2 times a night (i.e., nocturia)    Negative: Dribbling (losing urine) just after finishing urination (i.e., post-void dribbling)    Negative: Urination is difficult to start (i.e., hesitancy) or straining    Negative: [1] Can't control passage of urine (i.e., urinary incontinence, wetting self) AND [2] present > 2 weeks    Answer Assessment - Initial Assessment Questions  1. SYMPTOM: \"What's the main symptom you're concerned about?\" (e.g., frequency, incontinence)      hematuria  2. ONSET: \"When did the hematuria  start?\"      Few months  3. PAIN: \"Is there any pain?\" If so, ask: \"How bad is it?\" (Scale: 1-10; mild, moderate, severe)      no  4. CAUSE: \"What do you think is causing the symptoms?\"      ?  5. OTHER SYMPTOMS: \"Do you have any other symptoms?\" (e.g., fever, flank pain, blood in urine, pain with urination)      none  6. PREGNANCY: \"Is there any chance you are pregnant?\" \"When was your last menstrual period?\"      no    Protocols used: URINARY SYMPTOMS-A-AH    "

## 2019-07-17 NOTE — PATIENT INSTRUCTIONS
Per Physician's instructions.      If you have questions or concerns on any instructions given to you by your provider today or if you need to schedule an appointment, you can reach us at [phone].     Thank you!

## 2019-07-17 NOTE — NURSING NOTE
"Initial /58 (BP Location: Right arm, Patient Position: Sitting, Cuff Size: Adult Regular)   Pulse 60   Temp 97.5  F (36.4  C) (Tympanic)   Wt 71.7 kg (158 lb)   BMI 23.33 kg/m   Estimated body mass index is 23.33 kg/m  as calculated from the following:    Height as of 6/5/19: 1.753 m (5' 9\").    Weight as of this encounter: 71.7 kg (158 lb). .    Margarito BERNAL CMA    "

## 2019-07-18 NOTE — NURSING NOTE
Active order to obtain bladder scan? Yes   Name of ordering provider: Dr. Vega  Bladder scan preformed post void Yes.  Bladder scan revealed 97 ml  Provider notified?  Yes    Margarito BERNAL CMA

## 2019-07-18 NOTE — PROGRESS NOTES
Appointment source: Established Patient  Patient name: Andres Sow  Urology Staff: Robert Vega MD    Subjective: This is a 85 year old year old male returning for follow up of recurrent hematuria and a history of urinary retention.    Objective:  Emptying his bladder satisfactorily but recurrent hematuria bothersome.    No compelling evidence of neoplasm    Plan:  Will start finasteride in addition to tamsulosin in an effort to reduce hematuria that is presumably secondary to BPH.    Return in 6 in 8 weeks for follow up.    Total time 20 minutes, counseling 15 minutes discussing bladder outlet obstruction and hematuria.

## 2019-07-30 NOTE — TELEPHONE ENCOUNTER
Reason for Call:  Other call back    Detailed comments: pt calling stating he has been experiencing UTI symptoms for about 2 months. Was up every 10 mins last night. Would like to know if he can get a cath placed?    Phone Number Patient can be reached at: Home number on file [phone] (home)    Best Time: any     Can we leave a detailed message on this number? YES    Call taken on 7/30/2019 at 8:40 AM by Aditi You

## 2019-07-30 NOTE — TELEPHONE ENCOUNTER
Discussed infection risk when a cath is in place.  Advised Dr Vega is out of clinic for two weeks and if he begins any sx of UTI like cloudy or foul smelling,  Could consider contacting his PCP.    Discussed his prostate irritation and occasional temp issues when sitting with pressure on prostate for periods of time.  States he did do a rather long car ride over the weekend and does get this when he goes to work for a couple of days in a row.    Discussed he could speak with Dr Vega at next appt about self cath if interested and still needed at that time.  Penny Meza RN  [facility]

## 2019-07-31 NOTE — PROGRESS NOTES
"Subjective     Andres Sow is a 85 year old male who presents to clinic today for the following health issues:    HPI   Genitourinary - Male  Onset: 2 weeks    Description:   Dysuria (painful urination): YES  Hematuria (blood in urine): no   Frequency: YES  Are you urinating at night : YES- every 10 mins  Hesitancy (delay in urine): YES  Retention (unable to empty): YES  Decrease in urinary flow: YES  Incontinence: slight \"dribbling\"    Progression of Symptoms:  same    Accompanying Signs & Symptoms:  Fever: no   Back/Flank pain: no   Urethral discharge: no   Testicle lumps/masses/pain: no   Nausea and/or vomiting: no   Abdominal pain: no     History:   History of frequent UTI's: no   History of kidney stones: no   History of hernias: no   Personal or Family history of Prostate problems: no  Sexually active: no     Precipitating factors:       Alleviating factors:  2-3 16 oz bottles of water daily  ADDITIONAL HPI: 85 year old male here for above issue.  He has a history of urinary retention and indwelling light for this. Light was removed 6/18/19. Since then he's had increasing difficulty with urination. Having small volume frequent voids with bladder/pelvic pressure.    ROS: 10 point review of systems negative except as per HPI.    PAST MEDICAL HISTORY:  Past Medical History:   Diagnosis Date     Hypertension      Unspecified vertiginous syndromes and labyrinthine disorders     seen in ED after starting metoprolol; continued on it without adverse reaction        ACTIVE MEDICAL PROBLEMS:  Patient Active Problem List   Diagnosis     Hyperlipidemia LDL goal <100     Bradycardia     Colon polyps     Anxiety     Hypertension goal BP (blood pressure) < 150/90     Advanced directives, counseling/discussion     CKD (chronic kidney disease) stage 3, GFR 30-59 ml/min (H)     Aortic valve stenosis, etiology of cardiac valve disease unspecified     Gout, unspecified cause, unspecified chronicity, unspecified site     " Essential hypertension, benign        FAMILY HISTORY:  Family History   Problem Relation Age of Onset     Hypertension Father      Breast Cancer Sister      Cerebrovascular Disease Sister        SOCIAL HISTORY:  Social History     Socioeconomic History     Marital status:      Spouse name: Not on file     Number of children: Not on file     Years of education: Not on file     Highest education level: Not on file   Occupational History     Not on file   Social Needs     Financial resource strain: Not on file     Food insecurity:     Worry: Not on file     Inability: Not on file     Transportation needs:     Medical: Not on file     Non-medical: Not on file   Tobacco Use     Smoking status: Former Smoker     Years: 25.00     Types: Cigarettes, Pipe, Cigars     Last attempt to quit: 1978     Years since quittin.6     Smokeless tobacco: Never Used   Substance and Sexual Activity     Alcohol use: Yes     Alcohol/week: 36.0 oz     Comment: drinks 2 drinks daily     Drug use: No     Sexual activity: Not Currently   Lifestyle     Physical activity:     Days per week: Not on file     Minutes per session: Not on file     Stress: Not on file   Relationships     Social connections:     Talks on phone: Not on file     Gets together: Not on file     Attends Confucianism service: Not on file     Active member of club or organization: Not on file     Attends meetings of clubs or organizations: Not on file     Relationship status: Not on file     Intimate partner violence:     Fear of current or ex partner: Not on file     Emotionally abused: Not on file     Physically abused: Not on file     Forced sexual activity: Not on file   Other Topics Concern     Parent/sibling w/ CABG, MI or angioplasty before 65F 55M? No   Social History Narrative     Not on file       MEDICATIONS:  Current Outpatient Medications   Medication Sig Dispense Refill     allopurinol (ZYLOPRIM) 100 MG tablet Take 1 tablet (100 mg) by mouth daily 90  "tablet 3     amLODIPine (NORVASC) 10 MG tablet Take 1 tablet (10 mg) by mouth daily 90 tablet 3     aspirin (ASA) 81 MG EC tablet Take 81 mg by mouth daily       finasteride (PROSCAR) 5 MG tablet Take 1 tablet (5 mg) by mouth daily 90 tablet 3     LORazepam (ATIVAN) 0.5 MG tablet Take 1 tablet (0.5 mg) by mouth 2 times daily as needed for anxiety 20 tablet 0     losartan (COZAAR) 100 MG tablet Take 1 tablet (100 mg) by mouth daily 90 tablet 3     tamsulosin (FLOMAX) 0.4 MG capsule Take 1 capsule (0.4 mg) by mouth daily 90 capsule 3     VITAMIN D 1000 UNIT OR CAPS 2 CAPSULE BY MOUTH ONCE DAILY       vitamin D3 (CHOLECALCIFEROL) 1000 units (25 mcg) tablet Take 1,000 Units by mouth 2 times daily       Multiple Vitamins-Minerals (PRESERVISION AREDS 2) CAPS Take 1 capsule by mouth 2 times daily          ALLERGIES:     Allergies   Allergen Reactions     Hydrochlorothiazide      Low Potassium     Lisinopril      Neck swelling/airway     Metoprolol Other (See Comments)     He was seen in the emergency room for lightheadedness after I increased his metoprolol. Patient stopped taking it-noted 6/18/2019.        Problem list, Medication list, Allergies, and Medical/Social/Surgical histories reviewed in [facility] and updated as appropriate.    OBJECTIVE:                                                    VITALS: BP (!) 154/58 (BP Location: Right arm, Cuff Size: Adult Regular)   Pulse 56   Temp 97.5  F (36.4  C) (Tympanic)   Resp 16   Ht 1.753 m (5' 9\")   Wt 68.1 kg (150 lb 3.2 oz)   SpO2 96%   BMI 22.18 kg/m   Body mass index is 22.18 kg/m .  GENERAL: Pleasant, well appearing male.    16 french light placed and passed without difficulty. No resistance or pain with balloon inflation.  However, once bladder started draining he had increasing pelvic pain that he rated as \"the worst pain I've ever had\". Adjusted light. This did not help. Tried to give it  A few minutes as I presumed it was due to bladder spasm but when symptoms " did not yasemin, given his degree of discomfort, I removed the light.  Light had drained aprox 450cc of straw colored urine.    Results for orders placed or performed in visit on 07/31/19   UA reflex to Microscopic and Culture   Result Value Ref Range    Color Urine Yellow     Appearance Urine Clear     Glucose Urine Negative NEG^Negative mg/dL    Bilirubin Urine Negative NEG^Negative    Ketones Urine Negative NEG^Negative mg/dL    Specific Gravity Urine 1.010 1.003 - 1.035    Blood Urine Large (A) NEG^Negative    pH Urine 6.5 5.0 - 7.0 pH    Protein Albumin Urine 30 (A) NEG^Negative mg/dL    Urobilinogen Urine 0.2 0.2 - 1.0 EU/dL    Nitrite Urine Negative NEG^Negative    Leukocyte Esterase Urine Small (A) NEG^Negative    Source Midstream Urine    Urine Microscopic   Result Value Ref Range    WBC Urine 5-10 (A) OTO5^0 - 5 /HPF    RBC Urine 10-25 (A) OTO2^O - 2 /HPF    Squamous Epithelial /LPF Urine Few FEW^Few /LPF    Bacteria Urine Few (A) NEG^Negative /HPF      ASSESSMENT/PLAN:                                                    1. Urinary retention  Attempted to place light but had significant painful bladder spasm. Discussed options. Could go to ER as they have more equipment or could maybe even use some sedation. Or we could have him try to get by without until he can see urology. He would prefer to see how things go overnight if he can get by. Unfortunately his urologist is out for the next 2 weeks and no urologist in clinic today but I told him I would discuss with urology tomorrow. If acute inability to void or pain increase in the interim he should go to ER.     2. UTI symptoms  No evidence of UTI.  - UA reflex to Microscopic and Culture  - Urine Microscopic

## 2019-07-31 NOTE — TELEPHONE ENCOUNTER
WILI Shanks MD called from [facility].  Negative for UTI and believes he is having retention.  Discussed Dr Vega's usual recommendation of placement of Light and allow about 10 days for bladder to decompress then appt with Urologist.    Dr Shanks attempted to place light and was unsuccessful.  Requests Dr Polanco be asked if willing to have him come in for Dr Polanco to Place Light in clinic Thursday afternoon.    (Pt will seek ER if pt becomes unable to void at all.)      Dr Polanco  Can you see Pt this afternoon to attempt a light placement for suspected retention.    Penny Meza RN  [facility]

## 2019-07-31 NOTE — PATIENT INSTRUCTIONS
Our Clinic hours are:  Mondays    7:20 am - 7 pm  Tues -  Fri  7:20 am - 5 pm    Clinic Phone: [phone]    The clinic lab opens at 7:30 am Mon - Fri and appointments are required.    [facility]. [phone]  Monday  8 am - 7pm  Tues - Fri 8 am - 5:30 pm

## 2019-07-31 NOTE — TELEPHONE ENCOUNTER
"Reason for call:  Patient reporting a symptom    Symptom or request: Pt has had \"urinary symptoms\" x 2 months.  Pt has been on and off antibiotics.  Frequency is every 10 minutes.  Pt has seen Urology and he is on vacation and he was told to call PCP.  Please call patient and advise.      Duration (how long have symptoms been present): ongoing    Have you been treated for this before? Yes    Additional comments:     Phone Number patient can be reached at:  Home number on file [phone] (home)    Best Time:  any    Can we leave a detailed message on this number:  YES    Call taken on 7/31/2019 at 10:06 AM by Poonam Patrick    "

## 2019-07-31 NOTE — TELEPHONE ENCOUNTER
"  Additional Information    Negative: Unable to urinate (or only a few drops) and bladder feels very full    Negative: Swollen scrotum    Negative: Pain in scrotum or testicle that persists > 1 hour    Negative: Fever > 100.5 F (38.1 C)    Negative: Vomiting    Negative: Patient sounds very sick or weak to the triager    Negative: Severe pain with urination    Negative: Side (flank) or lower back pain present    All other males with painful urination, or patient wants to be seen    Answer Assessment - Initial Assessment Questions  1. SEVERITY: \"How bad is the pain?\"  (e.g., Scale 1-10; mild, moderate, or severe)    - MILD (1-3): complains slightly about urination hurting    - MODERATE (4-7): interferes with normal activities      - SEVERE (8-10): excruciating, unwilling or unable to urinate because of the pain       Moderate pain with each time patient urinates, rates 7/10  2. FREQUENCY: \"How many times have you had painful urination today?\"       States frequency is every 10 minutes to hourly  3. PATTERN: \"Is pain present every time you urinate or just sometimes?\"       Every time  4. ONSET: \"When did the painful urination start?\"       Started 2 weeks ago  5. FEVER: \"Do you have a fever?\" If so, ask: \"What is your temperature, how was it measured, and when did it start?\"      denies  6. PAST UTI: \"Have you had a urine infection before?\" If so, ask: \"When was the last time?\" and \"What happened that time?\"       States has enlarged prostrate     7. CAUSE: \"What do you think is causing the painful urination?\"       Unsure, possible UTI  8. OTHER SYMPTOMS: \"Do you have any other symptoms?\" (e.g., flank pain, penile discharge, scrotal pain, blood in urine)      Denies all but has bladder pain with urination and has decreased urine flow, says is going about 1/3 the volume    Protocols used: URINATION PAIN - MALE-A-OH      Patient states he is taking Finasteride and Flomax, states he called his Urologist and Dr. Vega " is out. Advised to be seen to rule out UTI. Patient agrees to be seen today, appointment is placed with PCP. Patient is not taking any analgesics, and is drinking plenty of water.    KWABENA Vitale

## 2019-08-01 NOTE — NURSING NOTE
"Initial /59 (BP Location: Left arm, Patient Position: Chair, Cuff Size: Adult Regular)   Pulse 59   Resp 16  Estimated body mass index is 22.18 kg/m  as calculated from the following:    Height as of 7/31/19: 1.753 m (5' 9\").    Weight as of 7/31/19: 68.1 kg (150 lb 3.2 oz). .    Patient is here for cath placement .  tayo neff LPN    "

## 2019-08-01 NOTE — NURSING NOTE
"  Catheter insertion documentation on 8/1/2019:    Andres Sow presents to the clinic for catheter insertion.  Reason for insertion: urinary retention  Order has been verified. yes  Catheter successfully inserted into the urethral meatus in the usual sterile fashion without immediate complication.I also used lidocaine Gel  Type of catheter placed: 16 Korean  coude :\"indwelling\"} catheter  Urine is dark manuel in color.  450 cc's of urine output returned.  Balloon was filled with 10cc's of normal saline.  Securement device placed for the catheter.  The patient tolerated the procedure and was instructed to monitor for pain or discomfort. Patient has a appt with Dr Vega at the end of aug, patient was encouraged to drink extra fluid.  radha flor LPN      Radha Flor  "

## 2019-08-28 NOTE — PATIENT INSTRUCTIONS
Per physician instructions.    If you have questions or concerns on any instructions given to you by your provider today or if you need to schedule an appointment, you can reach us at [phone].    Thank you!

## 2019-08-28 NOTE — NURSING NOTE
"Chief Complaint   Patient presents with     Urinary Retention     Catheterization (insertion/replacement)       Initial /59 (BP Location: Right arm, Patient Position: Chair, Cuff Size: Adult Regular)   Pulse 61   Temp 96.9  F (36.1  C) (Tympanic)   Resp 16  Estimated body mass index is 22.18 kg/m  as calculated from the following:    Height as of 7/31/19: 1.753 m (5' 9\").    Weight as of 7/31/19: 68.1 kg (150 lb 3.2 oz).  BP completed using cuff size: regular   Medications and allergies reviewed.      Lilly MADRID CMA     "

## 2019-08-29 NOTE — NURSING NOTE
Late entry for 08/28/2019 @ 1100:   Andres Sow was brought back to the procedure room for his monthly cath change per Dr Vega. 8ml fluid was removed from his catheter balloon and a 16fr coude Tamez catheter was removed without difficulty. The Pt was then prepped in a sterile manner and a 16fr coude Tamez catheter was inserted.  Urine was returned. 10ml sterile water was used to fill the balloon.  Leg bag was attached with extension tubing and a secure device was placed. The Pt tolerated the procedure well and was sent home with new night bag and extra secure devices.      Lilly Flor, CMA

## 2019-08-29 NOTE — PROGRESS NOTES
Appointment source: Established Patient  Patient name: Andres Sow  Urology Staff: Robert Vega MD    Subjective: This is a 85 year old year old male returning for follow up of recurrent hematuria and urinary retention.    Has not noticed any further episodes of gross hematuria since starting the finasteride. Did stop the medication several days ago but was encouraged to obtain his refill and continue the medication indefinitely.    Objective:  Catheter was exchanged without incident today.     Assessment:  Urinary retention and recurrent gross hematuria secondary , presumably, to his light catheter.    Plan:  Will have him return in one month for a voiding trial and possible repeat cystoscopy for better evaluation of his bladder outlet should TURP be considered for relief of urinary retention.    Total time 25 minutes, counseling 15 minutes discussing management of urinary retention.

## 2019-09-11 NOTE — TELEPHONE ENCOUNTER
Reason for Call:  Form, our goal is to have forms completed with 72 hours, however, some forms may require a visit or additional information.    Type of letter, form or note:  handicap parking cert form    Who is the form from?: Patient    Where did the form come from: Patient or family brought in       What clinic location was the form placed at?: [facility]    Where the form was placed: Given to physician    What number is listed as a contact on the form?: [phone]       Additional comments:     Call taken on 9/11/2019 at 12:27 PM by Poonam Patrick

## 2019-09-24 NOTE — NURSING NOTE
"Initial /53 (BP Location: Right arm, Patient Position: Sitting, Cuff Size: Adult Regular)   Pulse 74   Temp 96.9  F (36.1  C) (Tympanic)   Ht 1.765 m (5' 9.5\")   Wt 67.9 kg (149 lb 12.8 oz)   BMI 21.80 kg/m   Estimated body mass index is 21.8 kg/m  as calculated from the following:    Height as of this encounter: 1.765 m (5' 9.5\").    Weight as of this encounter: 67.9 kg (149 lb 12.8 oz). .    Tata Barahona MA    "

## 2019-09-24 NOTE — PROGRESS NOTES
Appointment source: Established Patient  Patient name: Andres Sow  Urology Staff: Robert Vega MD    Subjective: This is a 85 year old year old male returning for follow up of gross hematuria and urinary retention.    Has noted swelling onset of the penile shaft and groin.    Urine clear today.     Objective:  Examination revealed enlarged lymph nodes most prominently on the left but bilateral and induration and scattered nodularity in the penis.     Cystoscopy was attempted but hematuria resumed and visibility was poor again. No obvious neoplasm seen but again visibility was poor.    CT abd and pelvis reordered and will arrange for the study to be completed in the next several days.    Assessment:  Worrisome adenopathy and genital induration strongly suspicious for a neoplastic process.    Plan:  CT scan of the abdomen and pelvis and reinstitution of indwelling catheter bladder drainage.    Total time 25 minutes, counseling 15 minutes discussing examination findings and need for additional imaging evaluation.

## 2019-09-24 NOTE — NURSING NOTE
Pt brought back to the procedure room for a cystoscopy per Dr. Vega Informed consent obtained, pt prepped in a sterile manner and a uro jet was used.      Scope serial number: [id number] Olympus      Lilly M, CMA

## 2019-09-30 PROBLEM — D64.9 ANEMIA: Status: ACTIVE | Noted: 2019-01-01

## 2019-09-30 PROBLEM — R33.9 URINARY RETENTION: Status: ACTIVE | Noted: 2019-01-01

## 2019-09-30 PROBLEM — R68.89 SUSPECTED MALIGNANT NEOPLASM: Status: ACTIVE | Noted: 2019-01-01

## 2019-09-30 PROBLEM — R55 SYNCOPE: Status: ACTIVE | Noted: 2019-01-01

## 2019-09-30 PROBLEM — Z87.39 HISTORY OF GOUT: Status: ACTIVE | Noted: 2019-01-01

## 2019-09-30 PROBLEM — N39.0 URINARY TRACT INFECTION: Status: ACTIVE | Noted: 2019-01-01

## 2019-09-30 NOTE — ED TRIAGE NOTES
"weak and lightheaded, \"fainted this am\"  fell from standing position, no injury, hx o blood in urine,   "

## 2019-09-30 NOTE — PLAN OF CARE
"[facility] ADMISSION NOTE    Patient admitted to room 2213 at approximately 1600 via cart from emergency room. Patient was accompanied by transport tech and daughter.     Verbal SBAR report received from Lucita YUAN prior to patient arrival.     Patient trasferred to bed via self. Patient alert and oriented X 3. The patient is not having any pain. 0-10 Pain Scale: 0. Admission vital signs: Blood pressure (!) 147/47, pulse 56, temperature 97.5  F (36.4  C), temperature source Oral, resp. rate 16, height 1.753 m (5' 9\"), weight 67.1 kg (148 lb), SpO2 96 %. Patient and daughter were oriented to plan of care, call light, bed controls, tv, telephone, bathroom, and visiting hours.     Risk Assessment    The following safety risks were identified during admission: fall. Yellow risk band applied: YES.     Skin Initial Assessment    This writer admitted this patient and completed a full skin assessment and Anand score in the Adult PCS flowsheet. Appropriate interventions initiated as needed.     Secondary skin check completed by Sharmaine YUAN.    Anand Risk Assessment  Sensory Perception: 4-->no impairment  Moisture: 4-->rarely moist  Activity: 3-->walks occasionally  Mobility: 3-->slightly limited  Nutrition: 2-->probably inadequate  Friction and Shear: 3-->no apparent problem  Anand Score: 19  Bed Support Surface: Atmos Air mattress  Anand Intervention(s) Implemented: assistive lifting/lateral transfer device, encouraged fluids    Debora Pleitez RN      "

## 2019-09-30 NOTE — LETTER
Transition Communication Hand-off for Care Transitions to Next Level of Care Provider    Name: Andres Sow  : 1933  MRN #: [id number]  Primary Care Provider: Eveline Shanks  Primary Care MD Name: Dr. Shanks  Primary Clinic: [address]  [facility] [address]  Primary Care Clinic Name: [facility]  Reason for Hospitalization:  Undiagnosed cardiac murmurs [R01.1]  Pelvic mass [R19.00]  Pulmonary nodules [R91.8]  Syncope, unspecified syncope type [R55]  Anemia, unspecified type [D64.9]  Admit Date/Time: 2019 12:24 PM  Discharge Date: 10/4  Payor Source: Payor: MEDICARE / Plan: MEDICARE / Product Type: Medicare /     Readmission Assessment Measure (GARTH) Risk Score/category: average           Reason for Communication Hand-off Referral: Fragility    Discharge Plan: TCU        Concern for non-adherence with plan of care:   Y/N no  Discharge Needs Assessment:  Needs      Most Recent Value   Home Care  [facility] [phone], Fax: [phone]          Follow-up specialty is recommended: Yes    Follow-up plan:    Future Appointments   Date Time Provider Department Center   10/15/2019 10:00 AM LANEY Vega MD [facility]           Referrals     Future Labs/Procedures    CARDIOLOGY EVAL ADULT REFERRAL     Comments:    Preferred location:  [facility] [phone]   [email or web address]    Please be aware that coverage of these services is subject to the terms and limitations of your health insurance plan.  Call member services at your health plan with any benefit or coverage questions.      Please bring the following to your appointment:  List of current medications  This referral request   Any documents/labs given to you for this referral      Call to schedule an appointment for a date/time when one of your daughters can accompany you to the Cardiology Clinic at  [facility].  After entering the main entrance (use [employer] service), look for the elevator on your right, immediately to the right of the pharmacy.  Take the elevator to the 2nd floor.  Check in at the first reception area on your right--Cardiology Clinic.    Cardiology scheduling at [facility] [phone]    Reason for Consult:  Echocardiogram 10/1/19 showed severe aortic stenosis    Occupational Therapy Adult Consult     Comments:    Evaluate and treat as clinically indicated.    Reason: Globally weak, poor mobility, and poor ability to independently perform ADLs, due to underlying neoplasm and prolonged hospital stay.    Physical Therapy Adult Consult     Comments:    Evaluate and treat as clinically indicated.    Reason: Globally weak and deconditioned due to underlying neoplasm and prolonged hospital stay.          Supplies     Future Labs/Procedures    Wheelchair Order     Process Instructions:    By signing this order, the Authorizing Provider is attesting that they have completed a face-to-face evaluation on the patient to determine their need for this equipment in the last 60 days. A new face-to-face evaluation is required each time  A new prescription for on eo f the specified items is ordered.   If an additional provider completed the evaluation, please indicate their name below.     **As of 2018, an order requisition and face sheet will print for all DME orders. Please give printed order and face sheet to patient if not obtaining product from [facility] DME closet.     Comments:    I, the undersigned, certify that the above prescribed supplies are medically necessary for this patient and is both reasonable and necessary in reference to accepted standards of medical and necessary in reference to accepted standards of medical practice in the treatment of this patient's condition and is not prescribed as a convenience.           Key Recommendations:  Discharging  to TCU for theapies to work with weakness, follow up with urology and oncology likely for likely metastatic cancer.  Lives independently at baseline and would like to return there ASAP.  Daughter, Martha helpful and involved    Chanel Good RN    AVS/Discharge Summary is the source of truth; this is a helpful guide for improved communication of patient story

## 2019-09-30 NOTE — H&P
[facility]    History and Physical - Hospitalist Service       Date of Admission:  9/30/2019    Assessment & Plan   Andres Sow is a 85 year old male admitted on 9/30/2019. He is being admitted for further evaluation and treatment of a syncopal episode and anemia, as well as work-up of probable metastatic prostate malignancy.    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 Evaluated by urology (Dr. Vega) who reports prostate exam is abnormal, recommends biopsy.  - Urology consult  - Plan for biopsy either 10/1 or 10/2 after hemoglobin stable - NPO at midnight    Syncope  Syncopal episode at home 9/30/2019. Likely secondary to anemia and known UTI (see below). No complaints of acute pain since fall. No cognitive changes. Is on aspirin 81 mg daily but not anticoagulation.  - Address anemia and UTI as below  - Fall precautions  - Echo tomorrow  - No head CT at this time (no cognitive or neuro changes from baseline)    Anemia - acute on chronic  Admit hemoglobin 6.8, baseline between 9 - 10. Patient with recent hematuria and new suspected cancer, which is likely the cause. Patient was consented for transfusion, paper consent present in paper chart.  - Transfuse 1U PRBC  - Recheck hemoglobin in am  - Conditional transfusion for hemoglobin < 7.0    Urinary tract infection  Urinary retention  Patient has chronic Tamez, last changed in clinic 6 days prior to admission. Had E.coli positive urine culture sensitive to ampicillin, was started on ampicillin 500 mg tid on 9/27. Is afebrile, no leukocytosis (WBC 9.0) upon admission. Repeat admit UA positive for small leukocyte esterase and pyuria (WBC > 182). Taking Proscar 5 mg and Flomax 0.4  mg daily prior to admission.  - Continue prior to admission ampicillin  - Await repeat urine culture results  - Continue Tamez  - Continue prior to admission Proscar and Flomax    Heart murmur  Aortic valve stenosis, etiology of cardiac valve disease unspecified  Pronounced murmur on exam. Last echo in 2012 with mild aortic stenosis, +1 mitral regurgitation, trace tricuspid regurgitation, and trace tricuspid regurgitation. Murmur may be exacerbated by current anemia.  - Echo tomorrow for further evaluation    CKD (chronic kidney disease) stage 3, GFR 30-59 ml/min  Admit creatinine 1.34 (baseline 1.1 - 1.4), GFR 48 (baseline 44 - 55). Stable.  - Monitor  - Avoid nephrotoxins    Hypertension goal BP (blood pressure) < 150/90  Managed prior to admission with amlodipine 10 mg daily and losartan 100 mg daily.  - Continue prior to admission amlodipine and losartan     Hyperlipidemia LDL goal <100  Not on statin / lipid lowering medication prior to admission.  - Defer to outpatient management    Anxiety  Uses Ativan 0.5 mg bid prn, continue.    Gout, unspecified cause, unspecified chronicity, unspecified site  No evidence of current flare. Managed prior to admission with allopurinol 100 mg daily, continue.       Diet: Regular, NPO midnight  DVT Prophylaxis: Pneumatic Compression Devices  Tamez Catheter: in place, indication:    Code Status: Full - discussed with patient and his daughter upon presentation, at this point he would like attempts at resuscitation, but not for prolonged amounts of time    Disposition Plan   Expected discharge: 2 - 3 days, recommended to home vs TCU once work-up is completed, hemoglobin is stable, and safe disposition plan is in place.  Entered: Fallon Avina PA-C 09/30/2019, 3:36 PM     The patient's care was discussed with the Attending Physician, Dr. Kip Novak, Patient and Patient's Family.    Fallon Avina PA-C  [facility]  Center    ______________________________________________________________________    Chief Complaint   Syncopal episode, fatigue, generalized weakness    History is obtained from the patient, review of EMR, and emergency department documentation.    History of Present Illness   Andres Sow is a 85 year old male who presented to the emergency department after a syncopal episode.    He woke this morning and had completed a few morning tasks. He was feeling slightly lightheaded and generally weak. He had the morning news on TV. He was walking and without prodrome the next thing he knew he was waking on the floor. He had loss of consciousness but does not think it was for more than a few seconds based on the time on the TV. He denies any acute pain after the fall other than some left thigh pain, which he attributes possibly to laying in the hospital bed. No headache, vision changes, or cognitive changes. Because of the syncope, he presented to the emergency department.    He has been having hematuria and urinary retention for 3-4 months, has been following with urology Dr. Vega in clinic. He has had a chronic Tamez for the past few months. Until recently there was not concern for prostate cancer.     Over the past few weeks he has noted new fatigue and general weakness. He has been unable to walk as far as he used to without becoming fatigued.    On 9/26 he had a CT abdomen & pelvis showing evidence of possible metastatic cancer. He was also diagnosed with a UTI and started on ampicillin. This was going to be managed outpatient until he had his syncopal episode.    On review of systems he notes a 10-12 pound unintentional weight loss over the past few months. No abdominal pain, but notes some mild intermittent bladder discomfort in the past few weeks. He has some constipation which he occasionally struggled with for many years, but worse recently. He noted some low back pain the past few weeks as well. The  remainder review of systems is negative.    Review of Systems    The 10 point Review of Systems is negative other than noted in the HPI or here.     Past Medical History    I have reviewed this patient's medical history and updated it with pertinent information if needed.   Past Medical History:   Diagnosis Date     Hypertension      Unspecified vertiginous syndromes and labyrinthine disorders     seen in ED after starting metoprolol; continued on it without adverse reaction       Past Surgical History   I have reviewed this patient's surgical history and updated it with pertinent information if needed.  Past Surgical History:   Procedure Laterality Date     COLONOSCOPY  2007    Repeat colonoscopy in 3 years for surveillance     HC REMOVAL OF TONSILS,<13 Y/O         Social History   I have reviewed this patient's social history and updated it with pertinent information if needed.  Social History     Tobacco Use     Smoking status: Former Smoker     Years: 25.00     Types: Cigarettes, Pipe, Cigars     Last attempt to quit: 1978     Years since quittin.7     Smokeless tobacco: Never Used   Substance Use Topics     Alcohol use: Yes     Alcohol/week: 60.0 standard drinks     Comment: drinks 2 drinks daily     Drug use: No       Family History   I have reviewed this patient's family history and updated it with pertinent information if needed.   Family History   Problem Relation Age of Onset     Hypertension Father      Breast Cancer Sister      Cerebrovascular Disease Sister        Prior to Admission Medications   Prior to Admission Medications   Prescriptions Last Dose Informant Patient Reported? Taking?   LORazepam (ATIVAN) 0.5 MG tablet   No No   Sig: Take 1 tablet (0.5 mg) by mouth 2 times daily as needed for anxiety   Multiple Vitamins-Minerals (PRESERVISION AREDS 2) CAPS   Yes No   Sig: Take 1 capsule by mouth 2 times daily    VITAMIN D 1000 UNIT OR CAPS  Self Yes No   Si CAPSULE BY MOUTH ONCE  DAILY   allopurinol (ZYLOPRIM) 100 MG tablet   No No   Sig: Take 1 tablet (100 mg) by mouth daily   amLODIPine (NORVASC) 10 MG tablet   No No   Sig: Take 1 tablet (10 mg) by mouth daily   ampicillin (PRINCIPEN) 500 MG capsule   No No   Sig: Take 1 capsule (500 mg) by mouth 3 times daily   aspirin (ASA) 81 MG EC tablet   Yes No   Sig: Take 81 mg by mouth daily   finasteride (PROSCAR) 5 MG tablet   No No   Sig: Take 1 tablet (5 mg) by mouth daily   Patient not taking: Reported on 9/24/2019   losartan (COZAAR) 100 MG tablet   No No   Sig: Take 1 tablet (100 mg) by mouth daily   naloxone (NARCAN) 4 MG/0.1ML nasal spray   No No   Sig: Spray 1 spray (4 mg) into one nostril alternating nostrils once as needed for opioid reversal Every 2-3 minutes until patient responsive or EMS arrives   oxyCODONE-acetaminophen (PERCOCET) 5-325 MG tablet   No No   Sig: Take 1 tablet by mouth every 6 hours as needed for severe pain   tamsulosin (FLOMAX) 0.4 MG capsule   No No   Sig: Take 1 capsule (0.4 mg) by mouth daily   vitamin D3 (CHOLECALCIFEROL) 1000 units (25 mcg) tablet   Yes No   Sig: Take 1,000 Units by mouth 2 times daily      Facility-Administered Medications: None     Allergies   Allergies   Allergen Reactions     Hydrochlorothiazide      Low Potassium     Lisinopril      Neck swelling/airway     Metoprolol Other (See Comments)     He was seen in the emergency room for lightheadedness after I increased his metoprolol. Patient stopped taking it-noted 6/18/2019.        Physical Exam   Vital Signs:     BP: 131/49 Pulse: 54 Heart Rate: 56 Resp: 20 SpO2: 97 % O2 Device: None (Room air)    Weight: 148 lbs 0 oz    Constitutional: Alert, oriented, cooperative, no apparent distress, appears nontoxic, speaking in full sentences.     Eyes: Eyes are clear, pupils are reactive. No scleral icterus. Extroccular movements intact. Pale conjunctival palpebrae.     HEENT: Oropharynx is clear and moist, no lesions. Normocephalic, no evidence of  cranial trauma.      Cardiovascular: Regular rhythm, slow rate, normal S1 and S2. 5/6 systolic murmur that radiates into carotids as well as abdomen. No rubs or gallops. Peripheral pulses intact bilaterally. No lower extremity edema.    Respiratory: Lung sounds are clear to auscultation bilaterally without wheezes, rhonchi, or crackles.    GI: Soft, non-distended. Non-tender, no rebound or guarding. No hepatosplenomegaly or masses appreciated. Normal bowel sounds.     Musculoskeletal: Without obvious deformity, normal range of motion. Normal muscle bulk and tone. Distal CMS intact.      Skin: Pale. Warm and dry, no rashes or ecchymoses. No mottling of skin.      Neurologic: Patient moves all extremities. Cranial nerves are grossly intact.  is symmetric. Gross strength and sensation are equal bilaterally.    Genitourinary: Tamez catheter present draining bright red blood without clots.      Data   Data reviewed today: I reviewed all medications, new labs and imaging results over the last 24 hours. I personally reviewed the EKG tracing showing sinus rhythm with right bundle branch block and the chest CT image(s) showing pulmonary nodules.    Recent Labs   Lab 09/30/19  1237   WBC 9.0   HGB 6.8*   MCV 96      *   POTASSIUM 4.4   CHLORIDE 97   CO2 21   BUN 35*   CR 1.34*   ANIONGAP 9   NUVIA 9.0   *   ALBUMIN 2.9*   PROTTOTAL 6.8   BILITOTAL 0.2   ALKPHOS 64   ALT 13   AST 26   TROPI <0.015     Recent Results (from the past 24 hour(s))   CT Chest Pulmonary Embolism w Contrast    Narrative    CT CHEST WITH CONTRAST  9/30/2019 2:05 PM    HISTORY: Evaluate pulmonary nodules seen on a recent CT of the abdomen  and pelvis.     COMPARISON: A CT of the abdomen and pelvis on 9/26/2019 which included  the lower chest.    TECHNIQUE: Routine transverse CT imaging of the chest was performed  following the uneventful intravenous administration of 68 mL Isovue  370 contrast. Radiation dose for this scan was  reduced using automated  exposure control, adjustment of the mA and/or kV according to patient  size, or iterative reconstruction technique.    FINDINGS: The heart size is normal.No enlarged lymph node or other  abnormal mediastinal mass is seen. There is calcification within the  thoracic aorta. The vascular structures are otherwise unremarkable.  There are innumerable nodules seen diffusely throughout both lungs.  The largest is a collection of nodules in the left midlung measuring  up to 2.4 cm demonstrated on series 4 image 68. On the right there is  a 1.7 cm long nodule in the right infrahilar region on series 4 image  74. There is an additional 2.7 cm long mass just to the right of the  heart border on series 4 image 116. There are numerous smaller nodular  densities seen diffusely throughout both lungs. The lungs are  otherwise clear with no consolidation or infiltrate seen. No  pneumothorax is demonstrated. No pleural effusion is identified. There  are degenerative changes in the spine. The coronal series 6 image 94  suggests a 2.3 cm area of increased density in the right aspect of the  T12 vertebral body. This is not calcified. No other osseous  abnormality is noted. No chest wall pathology is seen. The visualized  upper abdomen is unremarkable.      Impression    IMPRESSION:   1. There are innumerable bilateral pulmonary nodules as described  above. Metastatic disease needs to be considered.  2. There is a 2.3 cm area of increased attenuation within the right  aspect of the T12 vertebral body. This is a nonspecific finding but  metastatic involvement needs to be considered.    KRISTINE JARVIS MD

## 2019-09-30 NOTE — PROGRESS NOTES
Skin affirmation note    Admitting nurse completed full skin assessment, Anand score and Anand interventions. This writer agrees with the initial skin assessment findings.

## 2019-09-30 NOTE — ED NOTES
DATE:  9/30/2019   TIME OF RECEIPT FROM LAB:  3780  LAB TEST:  hemaglobin  LAB VALUE:  6.8  RESULTS GIVEN WITH READ-BACK TO (PROVIDER):  {Choose the provider you called     :Philip  TIME LAB VALUE REPORTED TO PROVIDER:   7946

## 2019-09-30 NOTE — ED PROVIDER NOTES
"  History     Chief Complaint   Patient presents with     Generalized Weakness     weak and lightheaded, \"fainted this am\"  fell from standing position, no injury, hx o blood in urine,      HPI  Andres Sow is a 85 year old male with a history of hypertension, hyperlipidemia, CKD stage 3 and aortic valve stenosis who presents to the Emergency Department after a syncopal episode this morning. Patient was feeling weak while going about his morning routine today. He rested but felt faint when he got up to take his medications then recalls waking up on the floor. He states he was was unconscious for only seconds because he was watching the news and could see the time. He notes low back discomfort, ongoing for one week, which he attributes to sitting and laying more than usually as he has been weak and lethargic over the last several weeks. He notes a 10-12 pound unintentional weight loss in the last 4 months, without significant appetite change. He denies fevers, chest pain or shortness of breath. He reports occasional episodes of suprapubic abdominal discomfort which he attributes to the mutliple catheters he has required recently. Current catheter was placed 6 days ago in the urology clinic when patient was seen for follow up, with regard to ongoing gross hematuria and urinary retention over the last several months. He had a CT abdomen pelvis on that day (9/24/19) but reports he has not received the results. Imaging was suspicious for prostate malignancy and finding suspicious for osseous and pulmonary metastatic disease, see below for additional details. Patient was scheduled for a chest CT this week.     Previous records reviewed:  CT ABDOMEN PELVIS W/O & W CONTRAST 9/26/2019 2:20 PM  IMPRESSION:  1. Prostate malignancy is suspected with invasion of adjacent  structures and pelvic adenopathy.  2. There is an osseous lesion involving T12 which is nonspecific but  would have to be of concern for osseous " metastatic disease, given  other findings.  3. Nodules at the lung bases are of concern for pulmonary metastatic  disease. Dedicated chest CT could further assess, if clinically  warranted.        Allergies:  Allergies   Allergen Reactions     Hydrochlorothiazide      Low Potassium     Lisinopril      Neck swelling/airway     Metoprolol Other (See Comments)     He was seen in the emergency room for lightheadedness after I increased his metoprolol. Patient stopped taking it-noted 6/18/2019.        Problem List:    Patient Active Problem List    Diagnosis Date Noted     Aortic valve stenosis, etiology of cardiac valve disease unspecified 05/09/2019     Priority: Medium     Gout, unspecified cause, unspecified chronicity, unspecified site 05/09/2019     Priority: Medium     Essential hypertension, benign 05/09/2019     Priority: Medium     Advanced directives, counseling/discussion 05/16/2018     Priority: Medium     Patient does not have an Advance/Health Care Directive (HCD), requests blank HCD form.    Lucita Farley  May 16, 2018         Hypertension goal BP (blood pressure) < 150/90 03/29/2017     Priority: Medium     Anxiety 06/17/2014     Priority: Medium     Colon polyps 08/01/2012     Priority: Medium     Benign tubular adenomatous polyps - repeat colon in 5 years       Bradycardia 07/27/2012     Priority: Medium     CKD (chronic kidney disease) stage 3, GFR 30-59 ml/min (H) 01/01/2012     Priority: Medium     Hyperlipidemia LDL goal <100 10/31/2010     Priority: Medium        Past Medical History:    Past Medical History:   Diagnosis Date     Hypertension      Unspecified vertiginous syndromes and labyrinthine disorders        Past Surgical History:    Past Surgical History:   Procedure Laterality Date     COLONOSCOPY  8/30/2007    Repeat colonoscopy in 3 years for surveillance     HC REMOVAL OF TONSILS,<13 Y/O         Family History:    Family History   Problem Relation Age of Onset     Hypertension Father  "     Breast Cancer Sister      Cerebrovascular Disease Sister        Social History:  Marital Status:   [5]  Social History     Tobacco Use     Smoking status: Former Smoker     Years: 25.00     Types: Cigarettes, Pipe, Cigars     Last attempt to quit: 1978     Years since quittin.7     Smokeless tobacco: Never Used   Substance Use Topics     Alcohol use: Yes     Alcohol/week: 60.0 standard drinks     Comment: drinks 2 drinks daily     Drug use: No        Medications:    allopurinol (ZYLOPRIM) 100 MG tablet  amLODIPine (NORVASC) 10 MG tablet  ampicillin (PRINCIPEN) 500 MG capsule  aspirin (ASA) 81 MG EC tablet  finasteride (PROSCAR) 5 MG tablet  LORazepam (ATIVAN) 0.5 MG tablet  losartan (COZAAR) 100 MG tablet  Multiple Vitamins-Minerals (PRESERVISION AREDS 2) CAPS  naloxone (NARCAN) 4 MG/0.1ML nasal spray  oxyCODONE-acetaminophen (PERCOCET) 5-325 MG tablet  tamsulosin (FLOMAX) 0.4 MG capsule  VITAMIN D 1000 UNIT OR CAPS  vitamin D3 (CHOLECALCIFEROL) 1000 units (25 mcg) tablet        Review of Systems  All other systems are reviewed and are negative.     Physical Exam   BP: 102/55  Pulse: 60  Heart Rate: 60  Resp: 20  Height: 175.3 cm (5' 9\")  Weight: 67.1 kg (148 lb)  SpO2: 98 %      Physical Exam  Nursing note and vitals were reviewed.  Constitutional: Awake and alert, pale, poorly nourished, chronically ill appearing 85-year-old in no apparent discomfort, who answers questions appropriately and cooperates with examination.  HEENT: EOMI.   Neck: Freely mobile.  Cardiovascular: Cardiac examination reveals normal heart rate and regular rhythm with 4/6 systolic murmur heard best at the apex  Pulmonary/Chest: Breathing is unlabored.  Breath sounds are clear and equal bilaterally.  There no retractions, tachypnea, rales, wheezes, or rhonchi.  Abdomen: Soft, nontender, no HSM or masses rebound or guarding.  Musculoskeletal: Extremities are warm and well-perfused and without edema  Neurological: Alert, " oriented, thought content logical, coherent   Skin: Warm, dry, no rashes.  Psychiatric: Affect blunted and restricted.      ED Course        Procedures               EKG Interpretation:      Interpreted by Dhruv Palacios MD  Time reviewed: 12:48  Symptoms at time of EKG: generalized weakness   Rhythm: normal sinus   Rate: normal  Axis: left axis deviation  Ectopy: none  Conduction: RBBB  ST Segments/ T Waves: No ST-T wave changes  Q Waves: none  Comparison to prior: Compared to 7/22/2012, previous t wave inversions in V5 V6 have resolved    Clinical Impression: Right bundle branch block, bifascicular block, otherwise normal EKG          Critical Care time:  none               Results for orders placed or performed during the hospital encounter of 09/30/19 (from the past 24 hour(s))   CBC with platelets differential   Result Value Ref Range    WBC 9.0 4.0 - 11.0 10e9/L    RBC Count 2.11 (L) 4.4 - 5.9 10e12/L    Hemoglobin 6.8 (LL) 13.3 - 17.7 g/dL    Hematocrit 20.2 (L) 40.0 - 53.0 %    MCV 96 78 - 100 fl    MCH 32.2 26.5 - 33.0 pg    MCHC 33.7 31.5 - 36.5 g/dL    RDW 12.6 10.0 - 15.0 %    Platelet Count 198 150 - 450 10e9/L    Diff Method Automated Method     % Neutrophils 80.7 %    % Lymphocytes 8.4 %    % Monocytes 7.0 %    % Eosinophils 3.1 %    % Basophils 0.6 %    % Immature Granulocytes 0.2 %    Nucleated RBCs 0 0 /100    Absolute Neutrophil 7.3 1.6 - 8.3 10e9/L    Absolute Lymphocytes 0.8 0.8 - 5.3 10e9/L    Absolute Monocytes 0.6 0.0 - 1.3 10e9/L    Absolute Eosinophils 0.3 0.0 - 0.7 10e9/L    Absolute Basophils 0.1 0.0 - 0.2 10e9/L    Abs Immature Granulocytes 0.0 0 - 0.4 10e9/L    Absolute Nucleated RBC 0.0    Comprehensive metabolic panel   Result Value Ref Range    Sodium 127 (L) 133 - 144 mmol/L    Potassium 4.4 3.4 - 5.3 mmol/L    Chloride 97 94 - 109 mmol/L    Carbon Dioxide 21 20 - 32 mmol/L    Anion Gap 9 3 - 14 mmol/L    Glucose 115 (H) 70 - 99 mg/dL    Urea Nitrogen 35 (H) 7 - 30 mg/dL     Creatinine 1.34 (H) 0.66 - 1.25 mg/dL    GFR Estimate 48 (L) >60 mL/min/[1.73_m2]    GFR Estimate If Black 55 (L) >60 mL/min/[1.73_m2]    Calcium 9.0 8.5 - 10.1 mg/dL    Bilirubin Total 0.2 0.2 - 1.3 mg/dL    Albumin 2.9 (L) 3.4 - 5.0 g/dL    Protein Total 6.8 6.8 - 8.8 g/dL    Alkaline Phosphatase 64 40 - 150 U/L    ALT 13 0 - 70 U/L    AST 26 0 - 45 U/L   Troponin I   Result Value Ref Range    Troponin I ES <0.015 0.000 - 0.045 ug/L   Nt probnp inpatient (BNP)   Result Value Ref Range    N-Terminal Pro BNP Inpatient 2,612 (H) 0 - 1,800 pg/mL   UA reflex to Microscopic and Culture   Result Value Ref Range    Color Urine Red     Appearance Urine Cloudy     Glucose Urine Negative NEG^Negative mg/dL    Bilirubin Urine Negative NEG^Negative    Ketones Urine Negative NEG^Negative mg/dL    Specific Gravity Urine 1.013 1.003 - 1.035    Blood Urine Large (A) NEG^Negative    pH Urine 6.0 5.0 - 7.0 pH    Protein Albumin Urine 100 (A) NEG^Negative mg/dL    Urobilinogen mg/dL 0.0 0.0 - 2.0 mg/dL    Nitrite Urine Negative NEG^Negative    Leukocyte Esterase Urine Small (A) NEG^Negative    Source Midstream Urine     RBC Urine >182 (H) 0 - 2 /HPF    WBC Urine >182 (H) 0 - 5 /HPF    Mucous Urine Present (A) NEG^Negative /LPF   CT Chest Pulmonary Embolism w Contrast    Narrative    CT CHEST WITH CONTRAST  9/30/2019 2:05 PM    HISTORY: Evaluate pulmonary nodules seen on a recent CT of the abdomen  and pelvis.     COMPARISON: A CT of the abdomen and pelvis on 9/26/2019 which included  the lower chest.    TECHNIQUE: Routine transverse CT imaging of the chest was performed  following the uneventful intravenous administration of 68 mL Isovue  370 contrast. Radiation dose for this scan was reduced using automated  exposure control, adjustment of the mA and/or kV according to patient  size, or iterative reconstruction technique.    FINDINGS: The heart size is normal.No enlarged lymph node or other  abnormal mediastinal mass is seen. There  is calcification within the  thoracic aorta. The vascular structures are otherwise unremarkable.  There are innumerable nodules seen diffusely throughout both lungs.  The largest is a collection of nodules in the left midlung measuring  up to 2.4 cm demonstrated on series 4 image 68. On the right there is  a 1.7 cm long nodule in the right infrahilar region on series 4 image  74. There is an additional 2.7 cm long mass just to the right of the  heart border on series 4 image 116. There are numerous smaller nodular  densities seen diffusely throughout both lungs. The lungs are  otherwise clear with no consolidation or infiltrate seen. No  pneumothorax is demonstrated. No pleural effusion is identified. There  are degenerative changes in the spine. The coronal series 6 image 94  suggests a 2.3 cm area of increased density in the right aspect of the  T12 vertebral body. This is not calcified. No other osseous  abnormality is noted. No chest wall pathology is seen. The visualized  upper abdomen is unremarkable.      Impression    IMPRESSION:   1. There are innumerable bilateral pulmonary nodules as described  above. Metastatic disease needs to be considered.  2. There is a 2.3 cm area of increased attenuation within the right  aspect of the T12 vertebral body. This is a nonspecific finding but  metastatic involvement needs to be considered.    KRISTINE JARVIS MD       Medications   iopamidol (ISOVUE-370) solution 68 mL (68 mLs Intravenous Given 9/30/19 1353)   sodium chloride 0.9 % bag 500mL for CT scan flush use (95 mLs Intravenous Given 9/30/19 1353)       12:48 PM Patient assessed. Course of care outlined.     Assessments & Plan (with Medical Decision Making)     85-year-old male presented with generalized weakness and a syncopal episode.  He has had progressive anemia developing over the past several months.  His hemoglobin is now down to 6.8 and he will require hospitalization for monitoring due to the syncope  and for blood transfusion.  In addition he is noted to have a significant cardiac murmur heard best at the apex with his last echocardiogram performed in 2012 showing 1+ mitral regurgitation and aortic valve sclerosis without stenosis.  Either of these problems could have progressed and be contributing to his syncope and his generalized weakness.  However the additional work-up performed in the ED today showing an abnormal irregular prostate with possible prostate cancer and invasion of the seminal vesicles and bladder wall and with lesions in the thoracic spine and numerous pulmonary nodules 0.2 metastatic prostate cancer.  This too is likely causing his generalized weakness.  His urine analysis shows blood in pyuria but he does not have much in the way of symptoms of an infection and previous cultures have mostly been negative.  He had an attempt at cystoscopy last week but there was too much blood to adequately see the bladder.  He will need additional work-up of each of these problems including echocardiogram to assess his murmur, urology consultation, biopsy of one or more of the lesions to establish a diagnosis.  It seems clear he has some type of neoplastic disease with widespread metastases.  I discussed the findings with the patient and the recommendation for admission and he is in agreement with the plan.  I discussed his case with Dr. Novak on the hospital service and they will assume care for admission.  I discussed his case with Dr. Vega in urology and he will see the patient as well.    I have reviewed the nursing notes.    I have reviewed the findings, diagnosis, plan and need for follow up with the patient.       New Prescriptions    No medications on file       Final diagnoses:   Anemia, unspecified type   Syncope, unspecified syncope type   Undiagnosed cardiac murmurs   Pelvic mass   Pulmonary nodules     This document serves as a record of the services and decisions personally performed and  made by Dhruv Palacios MD. It was created on his behalf by Tiffany Dillard, a trained medical scribe. The creation of this document is based the provider's statements to the medical scribe.  Tiffany Dillard 1:17 PM 9/30/2019    Provider:   The information in this document, created by the medical scribe for me, accurately reflects the services I personally performed and the decisions made by me. I have reviewed and approved this document for accuracy prior to leaving the patient care area.  Dhruv Palacios MD 1:17 PM 9/30/2019 a    9/30/2019   [facility] EMERGENCY DEPARTMENT     Dhruv Palacios MD  09/30/19 1503

## 2019-10-01 NOTE — PROGRESS NOTES
Tele Nurse: web paged Dr. Flor pt's tele is SB 1  AVB RBBB,   HR 46, ND .31, QRS .13, QT .53, QTc .46, also requested tele orders.

## 2019-10-01 NOTE — PROGRESS NOTES
[facility]    Hospital Medicine Progress Note  Date of Service: 10/01/2019    Assessment & Plan   Andres Sow is a 85 year old male admitted on 9/30/2019. He is being admitted for further evaluation and treatment of a syncopal episode and anemia, as well as work-up of probable metastatic prostate malignancy.    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 Evaluated by urology (Dr. Vega) who reports prostate exam is abnormal, recommends biopsy.  - Urology consult requested.  - Plan for biopsy either 10/1 or 10/2.  Has been NPO since midnight in case biopsy planned for today.     Syncope  Syncopal episode at home 9/30/2019. Likely secondary to severe acute on chronic anemia.  No complaints of acute pain since fall. No cognitive changes. Is on aspirin 81 mg daily but not anticoagulation.  - Address anemia as below.  - Fall precautions.  - Echocardiogram to exclude significant valvular disease.  - No head CT at this time (no cognitive or neuro changes from baseline).     Anemia, acute on chronic, due to hematuria  Recent baseline Hgb between 9 - 10, admit Hgb 6.8 --> 6.9 this morning after 1 unit PRBC.  Has hematuria on UA, which is the likely cause.    - Transfuse additional 1U PRBC this morning.  - Follow hemoglobin, continue to transfuse for hemoglobin < 7.0.     Urinary tract infection  Urinary retention  Patient has chronic Tamez, last changed in clinic 6 days prior to admission. Had E.coli positive urine culture sensitive to ampicillin, was started on ampicillin 500 mg tid on 9/27/19.  Is afebrile, no leukocytosis (WBC 9.0) upon admission.  Repeat admit UA positive for small leukocyte esterase and hematuria (WBC > 182, RBC >  "182, \"large\" blood). Taking Proscar 5 mg and Flomax 0.4 mg daily prior to admission.  - Continue prior to admission ampicillin pending repeat urine culture results.  - Continue chronic Tamez.  - Continue prior to admission Proscar and Flomax.     Heart murmur  Aortic valve stenosis, etiology of cardiac valve disease unspecified  Pronounced murmur on exam. Last echo in 2012 with mild aortic stenosis, +1 mitral regurgitation, trace tricuspid regurgitation, and trace tricuspid regurgitation. Murmur may be exacerbated by current anemia.  - Echo today for further evaluation.     CKD (chronic kidney disease) stage 3, GFR 30-59 ml/min  Baseline GFR 44 - 55 in recent months, admission GFR 48 --> 54 today.  Renal function at baseline.  - Monitor GFR.  - Avoid nephrotoxins.     Hypertension goal BP (blood pressure) < 150/90  Managed prior to admission with amlodipine 10 mg daily and losartan 100 mg daily, continued here.  BP control here has been good.  - Continue prior to admission amlodipine and losartan.      Hyperlipidemia LDL goal <100  Not on statin / lipid lowering medication prior to admission.  - Defer to outpatient management     Anxiety  Uses Ativan 0.5 mg bid prn, continue.     Gout, unspecified cause, unspecified chronicity, unspecified site  No evidence of current flare. Managed prior to admission with allopurinol 100 mg daily, continue.        Diet: Regular, has been NPO since midnight in case prostate biopsy to be performed today.  DVT Prophylaxis: Pneumatic Compression Devices  Tamez Catheter: Chronic.   Code Status: Full - discussed with patient and his daughter upon presentation, at this point he would like attempts at resuscitation, but not for prolonged amounts of time.    Discussion: No recurrence of syncope or near-syncope since admission.  Awaiting word on timing of prostate biopsy.    Disposition: Anticipate discharge in 1 to 2 additional days.     Attestation:  I have reviewed today's vital signs, " "notes, medications, labs and imaging.  Amount of time performed on this follow-up visit: 35 minutes.    Jose Flor MD       Interval History   \"I feel OK\".  The only pain that he is experiencing is from the tip of the penis associated with the Tamez catheter.  Denies pain elsewhere.  He has had no dizziness, and no syncope or near syncope when up and around.    ROS:  CONSTITUTIONAL: NEGATIVE for chills, fever, sweats.  Appetite is poor.  EYES: NEGATIVE for visual changes, eye irritation.  ENT/MOUTH: NEGATIVE for epistaxis, nasal congestion, postnasal drainage or sinus pressure.  RESP: NEGATIVE for dyspnea, cough, wheeze, or respiratory chest pain.  CV: NEGATIVE for chest pain, palpitations, orthopnea.  GI: NEGATIVE for difficulties swallowing, abdominal pain, diarrhea, nausea or vomiting.  Last BM 3 days ago, not unusual for him.  : NEGATIVE for flank pain.  Mild discomfort from penis associated with Tamez catheter.  MUSCULOSKELETAL: NEGATIVE for back pain, joint pain, or joint swelling.  NEURO: NEGATIVE for focal numbness or weakness, stroke or seizure disorder.  PSYCHIATRIC: NEGATIVE for anxiety or depression, panic, or recent change in mood.      Physical Exam   Temp:  [97.4  F (36.3  C)-99  F (37.2  C)] 98.4  F (36.9  C)  Pulse:  [54-60] 54  Heart Rate:  [50-60] 51  Resp:  [14-20] 16  BP: (102-147)/(40-55) 136/42  SpO2:  [95 %-99 %] 96 %    Weights:   Vitals:    09/30/19 1222   Weight: 67.1 kg (148 lb)    Body mass index is 21.86 kg/m .    GENERAL: Pleasant, slender, pale appearing man, who looks comfortable.  EYES: Eyes grossly normal to inspection, extraocular movements intact  HENT: Nares patent bilaterally.  Nasal mucosa normal, no discharge.  NECK: Trachea midline, no stridor.   RESP: No accessory muscle use.  Symmetrical breath sounds.  Lungs clear throughout on inspiration and expiration.  Expiration not prolonged, no wheeze.  CV: Regular rate and rhythm, non-tachycardic.  Normal S1 S2, grade " II/VI midsystolic murmur heard across the precordium, no extra sound.  No lower extremity edema.  ABDOMEN: Flat, soft, non-tender, no guarding.  Liver and spleen not enlarged, no masses palpable.  Bowel sounds positive.  MS: No bony deformities noted.  No red or inflamed joints.  SKIN: Warm and dry, no rashes.  NEURO: Alert, oriented, conversant.  Cranial nerves III - XII grossly intact.  No gross motor or sensory deficits.    : Tamez catheter noted.  PSYCH: Calm, alert, conversant.  Able to articulate logical thoughts, no tangential thoughts, no hallucinations or delusions.  Affect normal.        Data   Recent Labs   Lab 10/01/19  0454 09/30/19  1237   WBC 8.1 9.0   HGB 6.9* 6.8*   MCV 91 96    198    127*   POTASSIUM 4.9 4.4   CHLORIDE 103 97   CO2 22 21   BUN 31* 35*   CR 1.20 1.34*   ANIONGAP 8 9   NUVIA 8.5 9.0   GLC 85 115*   ALBUMIN  --  2.9*   PROTTOTAL  --  6.8   BILITOTAL  --  0.2   ALKPHOS  --  64   ALT  --  13   AST  --  26   TROPI  --  <0.015       Recent Labs   Lab 10/01/19  0454 09/30/19  1237   GLC 85 115*        Unresulted Labs Ordered in the Past 30 Days of this Admission     Date and Time Order Name Status Description    9/30/2019 1233 Urine Culture Aerobic Bacterial In process            Imaging  Recent Results (from the past 24 hour(s))   CT Chest Pulmonary Embolism w Contrast    Narrative    CT CHEST WITH CONTRAST  9/30/2019 2:05 PM    HISTORY: Evaluate pulmonary nodules seen on a recent CT of the abdomen  and pelvis.     COMPARISON: A CT of the abdomen and pelvis on 9/26/2019 which included  the lower chest.    TECHNIQUE: Routine transverse CT imaging of the chest was performed  following the uneventful intravenous administration of 68 mL Isovue  370 contrast. Radiation dose for this scan was reduced using automated  exposure control, adjustment of the mA and/or kV according to patient  size, or iterative reconstruction technique.    FINDINGS: The heart size is normal.No enlarged  lymph node or other  abnormal mediastinal mass is seen. There is calcification within the  thoracic aorta. The vascular structures are otherwise unremarkable.  There are innumerable nodules seen diffusely throughout both lungs.  The largest is a collection of nodules in the left midlung measuring  up to 2.4 cm demonstrated on series 4 image 68. On the right there is  a 1.7 cm long nodule in the right infrahilar region on series 4 image  74. There is an additional 2.7 cm long mass just to the right of the  heart border on series 4 image 116. There are numerous smaller nodular  densities seen diffusely throughout both lungs. The lungs are  otherwise clear with no consolidation or infiltrate seen. No  pneumothorax is demonstrated. No pleural effusion is identified. There  are degenerative changes in the spine. The coronal series 6 image 94  suggests a 2.3 cm area of increased density in the right aspect of the  T12 vertebral body. This is not calcified. No other osseous  abnormality is noted. No chest wall pathology is seen. The visualized  upper abdomen is unremarkable.      Impression    IMPRESSION:   1. There are innumerable bilateral pulmonary nodules as described  above. Metastatic disease needs to be considered.  2. There is a 2.3 cm area of increased attenuation within the right  aspect of the T12 vertebral body. This is a nonspecific finding but  metastatic involvement needs to be considered.    KRISTINE JARVIS MD        I reviewed all new labs and imaging results over the last 24 hours. I personally reviewed no images or EKG's today.    Medications       allopurinol  100 mg Oral QPM     amLODIPine  10 mg Oral Daily     ampicillin  500 mg Oral TID     losartan  100 mg Oral Daily     sodium chloride (PF)  3 mL Intracatheter Q8H     tamsulosin  0.4 mg Oral QPM       Jose Flor MD

## 2019-10-01 NOTE — PLAN OF CARE
Fleets enema given prior to biopsy per MD order orlin red blood noted in light bag rounding MD aware

## 2019-10-01 NOTE — PROGRESS NOTES
Echo completed. See echo report.  Report from Dr Chowdhury cardiology stated severe aortic stenosis noted. Last echo in 2012 showed mild stenosis.  Dr. Flor updated with this information.

## 2019-10-01 NOTE — PLAN OF CARE
Pt is alert and oriented x4. Denied feelings of weakness, lightheadedness or dizziness. Reported sleeping on and off. Saline locked. VSS.

## 2019-10-01 NOTE — PLAN OF CARE
Discharge Planner OT   Patient plan for discharge: Home     Current status: Eval complete. Independent for supine <> sit. SBA for sit to stand and to ambulate throughout room having B UE support on IV pole. Pt states he does feel weak/tired- declines further activity wanting to rest.     Barriers to return to prior living situation: stairs within home, lives alone     Recommendations for discharge: Home with home therapy to increase strength/activity tolerance.     Rationale for recommendations: Steady on feet today, however wanting B UE support on IV pole d/t weakness. Anticipate safe return home with home therapy to address generalized weakness.        Entered by: Bisi Charles 10/01/2019 9:07 AM

## 2019-10-01 NOTE — PLAN OF CARE
"Pt is receiving 1 unit of Red Blood Cells- plan is to recheck hgb in am. No adverse reaction noted. Tamez is draining blood in urine. Pt's skin is pale. C/o weakness and fatigue. Echo scheduled to tomorrow. Lungs are diminished, VSS. /47 (BP Location: Left arm)   Pulse 58   Temp 98.6  F (37  C) (Oral)   Resp 16   Ht 1.753 m (5' 9\")   Wt 67.1 kg (148 lb)   SpO2 95%   BMI 21.86 kg/m      "

## 2019-10-01 NOTE — CONSULTS
Care Transition Initial Assessment - RN      Met with: Patient.    DATA   Principal Problem:    Suspected malignant neoplasm  Active Problems:    Hyperlipidemia LDL goal <100    Anxiety    Hypertension goal BP (blood pressure) < 150/90    CKD (chronic kidney disease) stage 3, GFR 30-59 ml/min (H)    Aortic valve stenosis, etiology of cardiac valve disease unspecified    Syncope    Anemia due to hematuria    Urinary tract infection    History of gout    Urinary retention       Primary Care Clinic Name: [facility]  Primary Care MD Name: Dr. Shanks  Contact information and PCP information verified: Yes    ASSESSMENT  Cognitive Status: awake, alert and oriented.  Lives With: alone   Living Arrangements: ([facility] home)     Description of Support System: Supportive, Involved   Who is your support system?: Children   Support Assessment: Adequate family and caregiver support   Insurance Concerns: No Insurance issues identified      This writer met with pt, introduced self and role.  Discussed discharge planning and Medicare guidelines in regards to home care, TCU and LTC.  The patient lives alone, independently in the community.  He has two daughter that live near by and clean his house.  Discussed home care.  Pt was provided with Medicare certified home care list. Pt chooses to use [facility] ([phone] Fax: [phone]).  Referral placed for home care, PT.  Patient agreed to Clinic Care Coordination.    Care Transitions will continue to monitor through daily chart reviews and Interdisciplinary Rounds.       PLAN    Home with [facility]      Rocio George RN, Care Coordinator [phone]

## 2019-10-01 NOTE — PATIENT INSTRUCTIONS
Drink 16 oz. Of fluid for the first 3 hours after the procedure.    Report any fevers over 101 or excessive bleeding.    Bleeding in the urine, semen, and stool is normal after this procedure.    Finish antibiotics as prescribed.

## 2019-10-01 NOTE — PROGRESS NOTES
10/01/19 0900   Quick Adds   Type of Visit Initial Occupational Therapy Evaluation   Living Environment   Lives With alone   Living Arrangements   (town home)   Home Accessibility stairs within home   Number of Stairs, Within Home, Primary   (flight to bedroom. )   Functional Level   Ambulation 0-->independent   Transferring 0-->independent   Toileting 0-->independent   Bathing 0-->independent   Dressing 0-->independent   Eating 0-->independent   Communication 0-->understands/communicates without difficulty   Swallowing 0-->swallows foods/liquids without difficulty   Cognition 0 - no cognition issues reported   Fall history within last six months yes   Number of times patient has fallen within last six months 1  (syncope reason for admit. )   General Information   Onset of Illness/Injury or Date of Surgery - Date 09/30/19   Referring Physician Fallon Avina PA-C   Patient/Family Goals Statement to return home.    Additional Occupational Profile Info/Pertinent History of Current Problem Andres Sow is a 85 year old male admitted on 9/30/2019. He is being admitted for further evaluation and treatment of a syncopal episode and anemia, as well as work-up of probable metastatic prostate malignancy.   Precautions/Limitations fall precautions   Cognitive Status Examination   Orientation orientation to person, place and time   Pain Assessment   Patient Currently in Pain Yes, see Vital Sign flowsheet  (site of catheter. increased discomfort sitting. )   Range of Motion (ROM)   ROM Comment B UE ROM: WNL   Strength   Strength Comments B UE strength: WFL for ADLs. Does appear frail and with decreased muscle tone.    Hand Strength   Hand Strength Comments B  strength: WFL    Transfer Skill: Bed to Chair/Chair to Bed   Level of [place]: Bed to Chair stand-by assist   Physical Assist/Nonphysical Assist: Bed to Chair 1 person assist   Assistive Device - Transfer Skill Bed to Chair Chair to Bed Rehab Eval  "  (using B UE support on IV pole. )   Transfer Skill: Sit to Stand   Level of [place]: Sit/Stand stand-by assist   Physical Assist/Nonphysical Assist: Sit/Stand 1 person assist   Transfer Skill: Toilet Transfer   Level of [place]: Toilet stand-by assist   Physical Assist/Nonphysical Assist: Toilet 1 person assist   Assistive Device grab bars   Upper Body Dressing   Level of [place]: Dress Upper Body independent   Lower Body Dressing   Level of [place]: Dress Lower Body   (NT d/t catheter pain. assess tomorrow)   Instrumental Activities of Daily Living (IADL)   Previous Responsibilities meal prep;housekeeping;laundry   IADL Comments Daughter assist with deep cleaning.    Activities of Daily Living Analysis   Impairments Contributing to Impaired Activities of Daily Living strength decreased   General Therapy Interventions   Planned Therapy Interventions ADL retraining;strengthening;progressive activity/exercise   Clinical Impression   Criteria for Skilled Therapeutic Interventions Met yes, treatment indicated   OT Diagnosis decreased independence with ADLs   Influenced by the following impairments generalized weakness, decreased activity tolerance    Assessment of Occupational Performance 1-3 Performance Deficits   Identified Performance Deficits showering, standing tolerance, home management tasks    Clinical Decision Making (Complexity) Low complexity   Therapy Frequency Daily   Predicted Duration of Therapy Intervention (days/wks) 2-3 days   Anticipated Discharge Disposition Home with Home Therapy   Risks and Benefits of Treatment have been explained. Yes   Patient, Family & other staff in agreement with plan of care Yes   [facility] AM-PAC  \"6 Clicks\" Daily Activity Inpatient Short Form   1. Putting on and taking off regular lower body clothing? 3 - A Little   2. Bathing (including washing, rinsing, drying)? 3 - A Little   3. Toileting, which includes using toilet, bedpan or urinal? 3 - " A Little   4. Putting on and taking off regular upper body clothing? 4 - None   5. Taking care of personal grooming such as brushing teeth? 4 - None   6. Eating meals? 4 - None   Daily Activity Raw Score (Score out of 24.Lower scores equate to lower levels of function) 21   Total Evaluation Time   Total Evaluation Time (Minutes) 8

## 2019-10-01 NOTE — PROGRESS NOTES
Appointment source: Established Patient  Patient name: Andres Sow  Urology Staff: Robert Vega MD    Subjective: This is a 85 year old year old male returning for follow up of pelvic mass anatomically consistent with prostate cancer despite PSA of 2.8 ng/mL    Objective:  Needle of the prostate was obtained using both ultrasound guidance and direct palpation in view of the distortion of the anatomy.    The ultrasound view was difficult to interpret.    Assessment:  Pelvic mass suspicious for metastatic prostate cancer.    Plan:  Continue indwelling bladder drainage and review pathology when available.

## 2019-10-02 NOTE — PLAN OF CARE
A7O, up with SBA and walker. VSS. RA. Regular diet. Lat BM yesterday. Tamez in place, red in color, no clots noted. PIV SL. No complaints of pain. Working with PT and OT.     Plan: Possible discharge to TCU.

## 2019-10-02 NOTE — CONSULTS
"CLINICAL NUTRITION SERVICES  -  ASSESSMENT NOTE     REASON FOR ASSESSMENT  Andres Sow is a 85 year old male seen by Registered Dietitian for Admission Nutrition Risk Screen for unintentional loss of 10# or more in the past two months, Provider order - unplanned weight loss and low appetite      NUTRITION HISTORY  - Information obtained from the patient. He states that he usually eats raisin bran cereal for breakfast, lunch might be toast and scrambled egg, supper is a frozen dinner. He usually drinks water with his meals, does not care for milk. He has recently started to drink one bottle of Ensure a day. He likes ice cream. He prefers small portions, says as he got older he eats smaller amounts. He does not like to waste food. He does not feel that he was eating less than his usual amount of food prior to admission.      CURRENT NUTRITION ORDERS  Diet Order:     Regular    Current Intake/Tolerance:  50% of his meals per the nursing flow sheet      NUTRITION FOCUSED PHYSICAL ASSESSMENT FOR DIAGNOSING MALNUTRITION)  Completed:  Yes Partial assessment orbital region, upper arm region, timple region, clavicle/acromion region, dorsal hand           Observed:    Muscle wasting (refer to documentation in Malnutrition section)    Obtained from Chart/Interdisciplinary Team:  none    ANTHROPOMETRICS  Height: 5' 9\"  Weight: 142 lbs 3.15 oz  Body mass index is 21 kg/m .  Weight Status:  Normal BMI  IBW: 154#s  % IBW: 92%  Weight History:   Wt Readings from Last 12 Encounters:   10/02/19 64.5 kg (142 lb 3.2 oz)   09/24/19 67.9 kg (149 lb 12.8 oz)   07/31/19 68.1 kg (150 lb 3.2 oz)   07/17/19 71.7 kg (158 lb)   06/18/19 71.7 kg (158 lb)   06/05/19 71.7 kg (158 lb)   06/03/19 71.7 kg (158 lb)   05/26/19 71.7 kg (158 lb)   05/24/19 71.7 kg (158 lb)   05/24/19 72.6 kg (160 lb)   05/20/19 72.6 kg (160 lb)   05/09/19 70.8 kg (156 lb)         LABS  Labs reviewed    MEDICATIONS  Medications reviewed      ASSESSED NUTRITION NEEDS " PER APPROVED PRACTICE GUIDELINES:    Dosing Weight 65 kg  Estimated Energy Needs: 0100-7827 kcals (30-35 Kcal/Kg)  Justification: underweight  Estimated Protein Needs: 78-98 grams protein (1.2-1.5 g pro/Kg)  Justification: Repletion  Estimated Fluid Needs: 1 mL/Kcal  Justification: maintenance    MALNUTRITION:  % Weight Loss:  > 7.5% in 3 months (severe malnutrition) 16# weight loss in the last three months (10%)  % Intake:  </= 50% for >/= 5 days (severe malnutrition)  Subcutaneous Fat Loss:  Orbital region mild depletion and Upper arm region mild depletion  Muscle Loss:  Temporal region mild-moderate depletion, Clavicle bone region moderate depletion, Acromion bone region moderate depletion and Dorsal hand region moderate -severe depletion  Fluid Retention:  No lower extremity edema per the provider note    Malnutrition Diagnosis: Severe malnutrition  In Context of:  Chronic illness or disease    NUTRITION DIAGNOSIS:  Inadequate oral intake related to decreased appetite as evidenced by ~16# weight loss over the last three months, he is eating 50% of his meals      NUTRITION INTERVENTIONS  Recommendations / Nutrition Prescription  High calorie, high protein oral nutrition supplement with meals  Ice cream for evening snack  .      Implementation  Nutrition education: No education needs assessed at this time  General/healthful diet and Medical Food Supplement  .      Nutrition Goals  Patient to consume 50-75% of his meals and oral nutrition supplement in 2-3 days.  .      MONITORING AND EVALUATION:  Progress towards goals will be monitored and evaluated per protocol and Practice Guidelines, Food intake and Liquid meal replacement or supplement intake.       Kendra Posada RD,LD  Clinical Dietitian

## 2019-10-02 NOTE — CONSULTS
[facility]    Palliative Care Consultation--Inpatient  Admission Date: 9/30/2019   Visit Date: October 2, 2019  PCP: Eveline Shanks   Requested by: Jose Flor MD      HISTORY of PRESENT ILLNESS:  Andres Sow is a 85 year old year old male with a history of intermittent orlin hematuria and urinary obstruction over the past 4 months; he has been visiting with Dr Vega for this.  On the day of admission, he had an episode of syncope and concluded he should probably be evaluated at the hospital, so he asked a neighbor to bring him in.  During the course of his ED workup, he was found to have a 4/6 murmur, Hgb of 6.8, suspected metastatic prostate cancer with metastasis to T12, pelvis and lungs.  He was admitted and transfused.  He underwent a prostate biopsy yesterday with results expected in approximately one week.  He was referred to Palliative Care for his fatigue.      ASSESSMENT & PLAN:    Fatigue, likely neoplastic in origin  - after tolerating test dose of Ritalin 2.5 mg, increased dose to 5 mg po BID with the 2nd dose of the day to be given no later than 2pm    Anorexia with weight loss of 23 pounds (13%) in the past year  - Nutrition consult    Advance Care Planning:  - Understanding of condition:  Understands he probably has cancer, and that he has a murmur.  Had viewed his anorexia as normal aging.   - Decisional capacity:  intaact  - Code status:  Full Code  - Health Care Directive: NO  - POLST:  No   - plan further discussion on Code status tomorrow    Goals of Care:  - Transitional Care following this hospitalization        Thank you for the opportunity to be of service to this patient and family.      Clifford Walsh CNP (Ann)  Palliative Care  Private cell:  [phone]     Face to face:   1030 - 1125, 55 min, > 50% spent discussing past medical/social history, potential benefits and minimal risks associated with use of Ritalin, encouraging him to see Cards following  hospitalization as his syncope could also have been related to his aortic stenosis.     Non face to face:   [id number] and 1125 - 1310 and 1455 - 1510, 80 min, > 50% spent coordinating care with  attending, nursing and Care Transitions.       = = = = = = = = = = = = = = = =      PROBLEM LIST  Patient Active Problem List   Diagnosis     Hyperlipidemia LDL goal <100     Bradycardia     Colon polyps     Anxiety     Hypertension goal BP (blood pressure) < 150/90     Advanced directives, counseling/discussion     CKD (chronic kidney disease) stage 3, GFR 30-59 ml/min (H)     Aortic valve stenosis, severe     Gout, unspecified cause, unspecified chronicity, unspecified site     Essential hypertension, benign     Syncope     Anemia due to hematuria     Urinary tract infection     Suspected malignant neoplasm     History of gout     Urinary retention       PAST MEDICAL HISTORY  Past Medical History:   Diagnosis Date     Hypertension      Unspecified vertiginous syndromes and labyrinthine disorders     seen in ED after starting metoprolol; continued on it without adverse reaction       PAST SURGICAL HISTORY  Past Surgical History:   Procedure Laterality Date     COLONOSCOPY  8/30/2007    Repeat colonoscopy in 3 years for surveillance     HC REMOVAL OF TONSILS,<13 Y/O         FAMILY MEDICAL HISTORY  Family History   Problem Relation Age of Onset     Hypertension Father      Breast Cancer Sister      Cerebrovascular Disease Sister        SOCIAL HISTORY  History   Smoking Status     Former Smoker     Years: 25.00     Types: Cigarettes, Pipe, Cigars     Quit date: 1/1/1978   Smokeless Tobacco     Never Used     Social History    Substance and Sexual Activity      Alcohol use: Yes        Alcohol/week: 60.0 standard drinks        Comment: drinks 2 drinks daily    History   Drug Use No     Social History     Patient does not qualify to have social determinant information on file (likely too young).   Social History Narrative     October 2, 2019:   11 years ago; has 2 daughters who live nearby.  He quit smoking in '78 with a 25 pack-year history.  He rents a 2-story townhome in [place] that is near a [employer] [place].  He is a retired .  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  He identifies as [employer], but drifted away from attending [employer] after his wife's death.  His daughters do housecleaning and grocery shopping for him.  He used to enjoy golf, but has no energy for it now.      Clifford Walsh (Ann), CNP    [facility]    [facility] cell:  [phone]        SPIRITUAL HISTORY  As above    ALLERGIES  Allergies   Allergen Reactions     Hydrochlorothiazide Other (See Comments)     Low Potassium     Lisinopril Swelling     Neck swelling/airway     Metoprolol Other (See Comments)     He was seen in the emergency room for lightheadedness after I increased his metoprolol. Patient stopped taking it-noted 6/18/2019.        MEDICATIONS  Medications Prior to Admission  No current facility-administered medications on file prior to encounter.   allopurinol (ZYLOPRIM) 100 MG tablet, Take 1 tablet (100 mg) by mouth daily  amLODIPine (NORVASC) 10 MG tablet, Take 1 tablet (10 mg) by mouth daily  ampicillin (PRINCIPEN) 500 MG capsule, Take 1 capsule (500 mg) by mouth 3 times daily  aspirin (ASA) 81 MG EC tablet, Take 81 mg by mouth every evening   losartan (COZAAR) 100 MG tablet, Take 1 tablet (100 mg) by mouth daily  Multiple Vitamins-Minerals (PRESERVISION AREDS 2) CAPS, Take 1 capsule by mouth 2 times daily   tamsulosin (FLOMAX) 0.4 MG capsule, Take 1 capsule (0.4 mg) by mouth daily  vitamin D3 (CHOLECALCIFEROL) 2000 units (50 mcg) tablet, Take 1 tablet by mouth every evening  finasteride (PROSCAR) 5 MG tablet, Take 1 tablet (5 mg) by mouth daily  LORazepam (ATIVAN) 0.5 MG tablet, Take 1 tablet (0.5 mg) by mouth 2 times daily as needed for anxiety  naloxone  "(NARCAN) 4 MG/0.1ML nasal spray, Spray 1 spray (4 mg) into one nostril alternating nostrils once as needed for opioid reversal Every 2-3 minutes until patient responsive or EMS arrives  oxyCODONE-acetaminophen (PERCOCET) 5-325 MG tablet, Take 1 tablet by mouth every 6 hours as needed for severe pain        Current Medications    allopurinol  100 mg Oral QPM     amLODIPine  10 mg Oral Daily     ampicillin  500 mg Oral TID     losartan  100 mg Oral Daily     methylphenidate  5 mg Oral BID     sodium chloride (PF)  3 mL Intracatheter Q8H     tamsulosin  0.4 mg Oral QPM       PRN Medications  acetaminophen, acetaminophen, lidocaine 4%, lidocaine (buffered or not buffered), LORazepam, melatonin, naloxone, ondansetron **OR** ondansetron, oxyCODONE, prochlorperazine **OR** prochlorperazine **OR** prochlorperazine, sodium chloride (PF)      REVIEW OF SYSTEMS  Admits to considerable fatigue; his daughters buy groceries and clean house for him.  So far, he's been able to maneuver the stairs at home to get to his bedroom and shower.  He has no assistive devices such as walker or wheelchair.  He has had gross hematuria and urinary retention requiring chronic indwelling light at intervals during the past 4 months.  Over the past year, he has lost 23 lbs (14%); he weighed 165 in March 2017 and now weights 148-142 lbs.  He has had an insidious onset of anorexia.  He has Raisin Bran every breakfast, which helps avert constipation, and cooks frozen meals otherwise--no \"scratch\" cooking.  Currently, fatigue kept him from working with PT and OT today; in recent days, it limits his ability to ambulate to only about 100 feet.  Due to the catheter, he has limited bed mobility at night, which he attributes to his awareness of low back pain when he goes to bed and early in the morning.  He typically avoids the use of Ibuprofen, but hasn't even tried Tylenol for the back pain.  Has an Rx for Percocet given to him by Dr Vega, but hasn't " taken a single dose due to fear of addiction.  Denies dyspnea at rest, chest pain, edema.  Retires at 8:30 pm and awakens at 8am; sleep is disturbed by back pain and light.  Has discomfort in light, but denies orlin pain otherwise.  Has a soft round mass at his L elbow which he says is not painful, stable in size, and the consequence of playing golf.     Performance Assessment:    Palliative Performance Scale, v.2     50%  Extensive disease. Normal or reduced intake; normal LOC or confusion; little ambulation (mainly sit/lie), ADLs w/much assistance, unable to do any work.          PHYSICAL EXAMINATION  Patient Vitals for the past 24 hrs:   BP Temp Temp src Pulse Heart Rate Resp SpO2 Weight   10/02/19 0945 -- -- -- -- -- -- -- 64.5 kg (142 lb 3.2 oz)   10/02/19 0727 137/47 98.2  F (36.8  C) Oral 53 -- 16 95 % --   10/02/19 0258 133/41 96.7  F (35.9  C) Oral 62 -- 17 94 % --   10/01/19 2317 126/49 98.5  F (36.9  C) Oral 62 -- 18 95 % --   10/01/19 2000 -- -- -- 56 -- -- 97 % --   10/01/19 1847 121/45 97.5  F (36.4  C) Oral 55 -- 18 96 % --   10/01/19 1521 -- -- -- -- (!) 46 -- -- --      Wt Readings from Last 5 Encounters:   10/02/19 64.5 kg (142 lb 3.2 oz)   09/24/19 67.9 kg (149 lb 12.8 oz)   07/31/19 68.1 kg (150 lb 3.2 oz)   07/17/19 71.7 kg (158 lb)   06/18/19 71.7 kg (158 lb)     Constitutional:  Napping but easily awakened  Eyes:  Anicteric, PERRL  HEENT:  OP pink, moist, no thrush, no dentures  Lymph/Hematologic:  No epitrochlear, axillary, anterior or posterior cervical, or supraclavicular lymphadenopathy is appreciated  Cardiovascular:  RRR w/m  Respiratory:  Clear, breathing unlabored  GI: Soft, non-tender, normal bowel sounds, no hepatosplenomegaly  Genitourinary:  Urine is stained dark red in color   Musculoskeletal:  Has difficulty changing position from laying to sitting--attributes it to light discomfort; spine NT to palpation  Skin:  Warm, dry; face is ashen in color; mobile golf-ball size tumor at L  elbow--nonpainful.  Neurological:  nonfocal  Psych:  Appears fatigued; grossly intact ST/LT recall, pleasant and affable      DATA  ROUTINE ICU LABS (Last four results)  CMP  Recent Labs   Lab 10/02/19  0515 10/01/19  0454 19  1237 19  1357    133 127*  --    POTASSIUM 5.0 4.9 4.4  --    CHLORIDE 104 103 97  --    CO2 24 22 21  --    ANIONGAP 7 8 9  --    GLC 79 85 115*  --    BUN 33* 31* 35*  --    CR 1.25 1.20 1.34*  --    GFRESTIMATED 52* 54* 48* 44*   GFRESTBLACK 60* 63 55* 54*   NUVIA 8.7 8.5 9.0  --    PROTTOTAL  --   --  6.8  --    ALBUMIN  --   --  2.9*  --    BILITOTAL  --   --  0.2  --    ALKPHOS  --   --  64  --    AST  --   --  26  --    ALT  --   --  13  --      CBC  Recent Labs   Lab 10/02/19  0515 10/01/19  0454 09/30/19  1237   WBC 8.9 8.1 9.0   RBC 2.72* 2.24* 2.11*   HGB 8.1* 6.9* 6.8*   HCT 24.3* 20.3* 20.2*   MCV 89 91 96   MCH 29.8 30.8 32.2   MCHC 33.3 34.0 33.7   RDW 15.9* 14.6 12.6    183 198     INRNo lab results found in last 7 days.  Arterial Blood GasNo lab results found in last 7 days.      No results found for this or any previous visit (from the past 48 hour(s)).  [facility]  Echocardiography Laboratory  [address] [place] [address]        Name: LEIGHANN MOTA  MRN: [id number]  : 1933  Study Date: 10/01/2019 02:14 PM  Age: 85 yrs  Gender: Male  Patient Location: [facility]  Reason For Study: Murmur, Syncope and Collapse  Ordering Physician: JENNIFER BROWN  Referring Physician: Eveline Shanks  Performed By: Jinny Tracey RDCS     BSA: 1.8 m2  Height: 69 in  Weight: 148 lb  HR: 50  BP: 138/51 mmHg  _____________________________________________________________________________          Procedure  Complete Portable Echo Adult. Optison (NDC #[id number]) given intravenously.  _____________________________________________________________________________          Interpretation Summary     1. The left ventricle is normal in  structure, function and size. The visual  ejection fraction is estimated at 55-60%.  2. The right ventricle is normal in structure, function and size.  3. Severe valvular aortic stenosis. Mean 43mmHg, Vmax 4.3m/s, ELLIE 0.7cm2, DI  0.16.     Echo from 2012 showed EF 50-55%, mild aortic stenosis.

## 2019-10-02 NOTE — PROGRESS NOTES
Reason for Follow up: discharge planning    Anticipated discharge needs: Met with patient.  Discussed discharge needs.  At this point, patient does not feel safe to discharge back home, stating he feels weak and unsafe.  He would like to talk with his daughters, but is considering TCU.  Patient was provided with Medicare certified nursing home list. Pts choices are as follows [facility] (Main: [phone] Admissions: [phone] Fax: [phone]), [facility] (Phone: [phone] Fax: [phone]), [facility] Phone (Main Phone:[phone] Admissions Phone:[phone] Fax: [phone]).  Referrals pending.      Next steps: CTS to follow    Discharge Planner   Discharge Plans in progress: TCU vs home care  Barriers to discharge plan: medical stability  Follow up plan: CTS to follow       Entered by: Chanel Good 10/02/2019 1:05 PM       Chanel Good RN  [facility] [phone]  [facility] [phone]

## 2019-10-02 NOTE — PLAN OF CARE
Discharge Planner PT   Patient plan for discharge: Pt undecided- but did state he feels he could not manage at home alone if discharged today.    Current status: Aracelial completed- Pt ambulating short distances in his room w/ SBA; requested support w/ longer distances, so amb. W/ RW- 100 feet x1, including up/ down the steps with SBA. - No c/o dizziness    Barriers to return to prior living situation: generalized weakness. Pt lives alone    Recommendations for discharge: TCU vs home w/ HC depending on progress.   Pt also to amb with nursing staff in preparation for discharge  - 3x/daily     Rationale for recommendations: See above        Entered by: Supriya Valdez 10/02/2019 11:41 AM

## 2019-10-02 NOTE — PROGRESS NOTES
[facility] Medicine Progress Note  Date of Service: 10/02/2019    Assessment & Plan   Andres Sow is a 85 year old male admitted on 9/30/2019. He is being admitted for further evaluation and treatment of a syncopal episode and anemia, as well as work-up of probable metastatic prostate malignancy.    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 Evaluated by urology (Dr. Vega) who reported prostate exam is abnormal, and who performed biopsy yesterday.  Pathology likely to be available in about a week.  - Await biopsy results.  - Dr. Vega requests follow-up in approx 2 weeks after discharge.     Syncope  Syncopal episode at home 9/30/2019. Has both severe acute on chronic anemia and severe aortic stenosis (see below) as potential contributors.  No complaints of pain following the fall. Since transfusion to Hgb 8.1, he has been up and ambulating without dizziness, pre-syncope, or syncope, arguing that the anemia is probably the more important contributor to his preadmission syncopal event.  - Address anemia as below.  - See discussion of aortic stenosis below.    Aortic stenosis, severe by echocardiogram  Echocardiogram was ordered to evaluate the preadmission syncopal event.  On the 10/1/2019 echocardiogram, he was found to have severe aortic stenosis, with a mean aortic gradient of 43 mmHg, and an aortic valve area of 0.7 cm .  The patient has no history of angina, no history of congestive heart failure, and the syncopal events prior to this admission, which occurred at a hemoglobin of 6.8, represents his only episode of presyncope or syncope.  Therefore, there seems to be no urgent, clinical need to refer him to  "cardiology or cardiac surgery, though this will need to be monitored.  - Discussed the echo results with the patient this morning, and answered his questions.  - Continue to monitor for the development of associated symptoms, and consider interval echocardiography in about 6 months.     Anemia, acute on chronic, due to hematuria  Recent baseline Hgb between 9 - 10, admit Hgb 6.8 --> 6.9 --> 8.1 this morning after 2 unit PRBC.  Has hematuria on UA, which is the likely cause.    - Repeat hemoglobin in AM, continue to transfuse for hemoglobin < 7.0.     Recent urinary tract infection  Chronic urinary retention  Patient has chronic Tamez, last changed in clinic 6 days prior to admission. Had E.coli positive urine culture sensitive to ampicillin, was started on ampicillin 500 mg tid on 9/27/19.  Was afebrile, no leukocytosis (WBC 9.0) upon admission.  Repeat admit UA positive for small leukocyte esterase and hematuria (WBC > 182, RBC > 182, \"large\" blood).  Subsequent culture was no growth.    - Continue prior to admission ampicillin, last day 10/4/19.  - Continue chronic Tamez.  - Continue prior to admission Proscar and Flomax.     CKD (chronic kidney disease) stage 3, GFR 30-59 ml/min  Baseline GFR 44 - 55 in recent months, admission GFR 48 --> 54 --> 52 today.  Renal function at baseline.  - Monitor GFR.  - Avoid nephrotoxins.     Hypertension goal BP (blood pressure) < 150/90  Managed prior to admission with amlodipine 10 mg daily and losartan 100 mg daily, continued here.  BP control here has been good.  - Continue prior to admission amlodipine and losartan.      Hyperlipidemia LDL goal <100  Not on statin / lipid lowering medication prior to admission.  - Defer to outpatient management     Anxiety  Uses Ativan 0.5 mg bid prn, continue.     Gout, unspecified cause, unspecified chronicity, unspecified site  No evidence of current flare. Managed prior to admission with allopurinol 100 mg daily, continue.        Diet: " "Regular diet.  DVT Prophylaxis: Pneumatic Compression Devices  Tamez Catheter: Chronic.   Code Status: Full - discussed with patient and his daughter upon presentation, at this point he would like attempts at resuscitation, but not for prolonged amounts of time.    Discussion: Prostate biopsy is complete, and the result is pending.  Hemoglobin is now 8.1 after 2 units packed red blood cell transfusion thus far.  Although he is not dizzy when up and ambulating, he has very poor endurance, and requested assistance from the physical therapist and walking any significant distance.  Although he is medically ready for discharge, I think that his original plan for discharge home alone is not feasible, and do not think that he would be able to perform self-care and activities of daily living at his current poor level of endurance.  I discussed this with him; he readily agrees.  We will begin the search for a transitional care unit; I will discuss this with the discharge planning team.    Disposition: Anticipate discharge as soon as tomorrow once a TCU bed is located.    Attestation:  I have reviewed today's vital signs, notes, medications, labs and imaging.  Amount of time performed on this follow-up visit: 35 minutes.  I discussed the patient's care face-to-face with Dr. Vega, urology.    Jose Flor MD       Interval History   \"I am feeling okay\".  Denies rectal or suprapubic pain, though continues to have mild pain at the tip of the penis associated with a Tamez catheter.  He has no flank pain.  He feels globally weak, and has poor endurance when ambulating in the halls with PT, though does not have problems with dizziness, presyncope, or syncope.    ROS:  CONSTITUTIONAL: NEGATIVE for chills, fever, sweats.    EYES: NEGATIVE for visual changes, eye irritation.  ENT/MOUTH: NEGATIVE for epistaxis, nasal congestion, postnasal drainage or sinus pressure.  RESP: NEGATIVE for dyspnea, cough, wheeze, or respiratory " chest pain.  CV: NEGATIVE for chest pain, palpitations, orthopnea.  GI: NEGATIVE for rectal pain, difficulties swallowing, abdominal pain, diarrhea, nausea or vomiting.  Does not feel constipated.  : NEGATIVE for flank pain or suprapubic pain.  Mild discomfort from penis associated with Tamez catheter continues.  MUSCULOSKELETAL: NEGATIVE for back pain, joint pain, or joint swelling.  NEURO: NEGATIVE for focal numbness or weakness, stroke or seizure disorder.  Feels globally weak.  PSYCHIATRIC: NEGATIVE for anxiety or depression, panic, or recent change in mood.      Physical Exam   Temp:  [96.7  F (35.9  C)-98.7  F (37.1  C)] 98.2  F (36.8  C)  Pulse:  [50-62] 53  Heart Rate:  [46] 46  Resp:  [16-18] 16  BP: (121-138)/(41-51) 137/47  SpO2:  [94 %-97 %] 95 %    Weights:   Vitals:    09/30/19 1222 10/02/19 0945   Weight: 67.1 kg (148 lb) 64.5 kg (142 lb 3.2 oz)    Body mass index is 21 kg/m .    GENERAL: Pleasant, slender, pale appearing man, seated propped in bed eating lunch, looks fatigued but comfortable.  EYES: Eyes grossly normal to inspection, extraocular movements intact  HENT: Nares patent bilaterally.  Nasal mucosa normal, no discharge.  NECK: Trachea midline, no stridor.   RESP: No accessory muscle use.  Symmetrical breath sounds.  Lungs clear throughout on inspiration and expiration.  Expiration not prolonged, no wheeze.  CV: Regular rate and rhythm, non-tachycardic.  Normal S1 S2, grade II/VI midsystolic murmur heard across the precordium, no extra sound.  No lower extremity edema.  ABDOMEN: Flat, soft, non-tender, no guarding.  Liver and spleen not enlarged, no masses palpable.  Bowel sounds positive.  MS: No bony deformities noted.  No red or inflamed joints.  SKIN: Warm and dry, no rashes.  NEURO: Alert, oriented, conversant.  Cranial nerves III - XII grossly intact.  No gross motor or sensory deficits.    : Tamez catheter noted, gross hematuria noted in urine in bag.  PSYCH: Calm, alert,  conversant.  Able to articulate logical thoughts, no tangential thoughts, no hallucinations or delusions.  Affect normal.        Data   Recent Labs   Lab 10/02/19  0515 10/01/19  0454 09/30/19  1237   WBC 8.9 8.1 9.0   HGB 8.1* 6.9* 6.8*   MCV 89 91 96    183 198    133 127*   POTASSIUM 5.0 4.9 4.4   CHLORIDE 104 103 97   CO2 24 22 21   BUN 33* 31* 35*   CR 1.25 1.20 1.34*   ANIONGAP 7 8 9   NUVIA 8.7 8.5 9.0   GLC 79 85 115*   ALBUMIN  --   --  2.9*   PROTTOTAL  --   --  6.8   BILITOTAL  --   --  0.2   ALKPHOS  --   --  64   ALT  --   --  13   AST  --   --  26   TROPI  --   --  <0.015       Recent Labs   Lab 10/02/19  0515 10/01/19  0454 09/30/19  1237   GLC 79 85 115*        Unresulted Labs Ordered in the Past 30 Days of this Admission     No orders found from 8/31/2019 to 10/1/2019.           Imaging  No results found for this or any previous visit (from the past 24 hour(s)).     I reviewed all new labs and imaging results over the last 24 hours. I personally reviewed no images or EKG's today.    Medications       allopurinol  100 mg Oral QPM     amLODIPine  10 mg Oral Daily     ampicillin  500 mg Oral TID     losartan  100 mg Oral Daily     methylphenidate  5 mg Oral BID     sodium chloride (PF)  3 mL Intracatheter Q8H     tamsulosin  0.4 mg Oral QPM       Jose Flor MD

## 2019-10-02 NOTE — PLAN OF CARE
OT: Upon attempt pt declines activity d/t fatigue from PT. Did discuss discharge plans and pt now considering TCU. Will attempt tomorrow as appropriate.

## 2019-10-02 NOTE — PROGRESS NOTES
10/02/19 1116   Quick Adds   Type of Visit Initial PT Evaluation   Living Environment   Lives With alone   Living Arrangements   ([facility])   Home Accessibility stairs within home   Number of Stairs, Within Home, Primary other (see comments)  (12)   Functional Level Prior   Ambulation 0-->independent   Transferring 0-->independent   Toileting 0-->independent   Bathing 0-->independent   Communication 0-->understands/communicates without difficulty   Swallowing 0-->swallows foods/liquids without difficulty   Cognition 0 - no cognition issues reported   Fall history within last six months yes   Prior Functional Level Comment PLOF- Pt reports indep. ambulation with no device- sustained one fall due to syncopal episode.    General Information   Onset of Illness/Injury or Date of Surgery - Date 09/30/19   Referring Physician Kailyn MAHONEY   Patient/Family Goals Statement Pt unsure re- DC plans   Pertinent History of Current Problem (include personal factors and/or comorbidities that impact the POC) 85 year old male admitted on 9/30/2019. He is being admitted for further evaluation and treatment of a syncopal episode and anemia, as well as work-up of probable metastatic prostate malignancy.   Precautions/Limitations fall precautions   General Observations Pt alert, pleasant- reports he was managing  sufficnetly at home, now reporting weakness    General Info Comments Tamez    Cognitive Status Examination   Orientation orientation to person, place and time   Level of Consciousness alert   Follows Commands and Answers Questions able to follow multistep instructions   Personal Safety and Judgment intact   Pain Assessment   Patient Currently in Pain No   Posture    Posture Forward head position   Range of Motion (ROM)   ROM Comment WFL    MMT: Hip, Rehab Eval   Hip Flexion - Left Side (4-/5) good minus, left   Hip Flexion - Right Side (4-/5) good minus, right   MMT: Knee, Rehab Eval   Knee Extension - Left Side (4/5) good,  "left   Knee Extension - Right Side (4/5) good, right   MMT: Ankle, Rehab Eval   Ankle Dorsiflexion - Left Side (4/5) good, left   Ankle Dorsiflexion - Right Side (4/5) good, left   Bed Mobility   Bed Mobility Comments CGA  supine> sitting   Transfer Skills   Transfer Comments SBA sit> stand    Gait   Gait Comments Pt ambulating short distances in his room w/ SBA; requested support w/ longer distances, so amb. W/ RW- 100 feet x1, including up/ down the steps with SBA. - No c/o dizziness. Pt also to amb w/ nursing staff in preparation  for discharge    Balance   Balance Comments good dynamic standing balance w/ RW use   Sensory Examination   Sensory Perception no deficits were identified   General Therapy Interventions   Planned Therapy Interventions gait training;strengthening;progressive activity/exercise   Clinical Impression   Criteria for Skilled Therapeutic Intervention yes, treatment indicated   PT Diagnosis Generalized weakness   Influenced by the following impairments Decreased strength   Functional limitations due to impairments decreased amb. status   Clinical Presentation Stable/Uncomplicated   Clinical Presentation Rationale clinical judgement   Clinical Decision Making (Complexity) Low complexity   Therapy Frequency Daily   Predicted Duration of Therapy Intervention (days/wks) 3 days   Anticipated Equipment Needs at Discharge   (Possible need for AD w/ longer distances)   Anticipated Discharge Disposition Home with Home Therapy;Transitional Care Facility   Risk & Benefits of therapy have been explained Yes   Patient, Family & other staff in agreement with plan of care Yes   [facility] AM-PAC  \"6 Clicks\" V.2 Basic Mobility Inpatient Short Form   1. Turning from your back to your side while in a flat bed without using bedrails? 4 - None   2. Moving from lying on your back to sitting on the side of a flat bed without using bedrails? 3 - A Little   3. Moving to and from a bed to a chair (including a " wheelchair)? 4 - None   4. Standing up from a chair using your arms (e.g., wheelchair, or bedside chair)? 4 - None   5. To walk in hospital room? 4 - None   6. Climbing 3-5 steps with a railing? 3 - A Little   Basic Mobility Raw Score (Score out of 24.Lower scores equate to lower levels of function) 22

## 2019-10-03 NOTE — PROGRESS NOTES
[facility] Medicine Progress Note  Date of Service: 10/03/2019    Assessment & Plan   Andres Sow is a 85 year old male admitted on 9/30/2019. He is being admitted for further evaluation and treatment of a syncopal episode and anemia, as well as work-up of probable metastatic prostate malignancy.    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 Evaluated by urology (Dr. Vega) who reported prostate exam is abnormal, and who performed biopsy 10/1/19.  Pathology likely to be available in about a week.  - Await biopsy results.  - Dr. Vega requests follow-up in approx 2 weeks after discharge.     Syncope  Syncopal episode at home 9/30/2019. Has both severe acute on chronic anemia and severe aortic stenosis (see below) as potential contributors.  No complaints of pain following the fall. Since transfusion to Hgb 8.1, he has been up and ambulating without dizziness, pre-syncope, or syncope, arguing that the anemia is probably the more important contributor to his preadmission syncopal event.  - Address anemia as below.  - See discussion of aortic stenosis below.    Aortic stenosis, severe by echocardiogram  Echocardiogram was ordered to evaluate the preadmission syncopal event.  On the 10/1/2019 echocardiogram, he was found to have severe aortic stenosis, with a mean aortic gradient of 43 mmHg, and an aortic valve area of 0.7 cm .  The patient has no history of angina, no history of congestive heart failure, and the syncopal events prior to this admission, which occurred at a hemoglobin of 6.8, represents his only episode of presyncope or syncope.  Therefore, there seems to be no urgent, clinical need to refer him to  "cardiology or cardiac surgery, though this will need to be monitored.  - Discussed the echo results with the patient, answered his questions.  - Continue to monitor for the development of associated symptoms, and consider interval echocardiography in about 6 months.     Anemia, acute on chronic, due to hematuria  Recent baseline Hgb between 9 - 10, admit Hgb 6.8 --> 8.1 after 2 unit PRBC, then 7.9 this morning.  I ordered an additional unit PRBC due to decline in Hgb and ongoing gross hematuria.    - Repeat hemoglobin in AM, continue to transfuse for hemoglobin < 7.0, or higher Hgb with symptoms.     Recent urinary tract infection  Chronic urinary retention  Patient has chronic Tamez, last changed in clinic 6 days prior to admission. Had E.coli positive urine culture sensitive to ampicillin, was started on ampicillin 500 mg tid on 9/27/19.  Was afebrile, no leukocytosis (WBC 9.0) upon admission.  Repeat admit UA positive for small leukocyte esterase and hematuria (WBC > 182, RBC > 182, \"large\" blood).  Subsequent culture was no growth.    - Continue prior to admission ampicillin, last day 10/4/19.  - Continue chronic Tamez.  - Continue prior to admission tamsulosin.  Will ask Dr. Vega if finasteride is of any benefit and what appears to be prostate cancer rather than benign hypertrophy.  Finasteride is on hold at present.     CKD (chronic kidney disease) stage 3, GFR 30-59 ml/min  Baseline GFR 44 - 55 in recent months, admission GFR 48 --> 54 --> 52 on 10/2/19.  Renal function at baseline.  - Monitor GFR.  - Avoid nephrotoxins.     Hypertension goal BP (blood pressure) < 150/90  Managed prior to admission with amlodipine 10 mg daily and losartan 100 mg daily, continued here.  BP control here has been good.  - Continue prior to admission amlodipine and losartan.      Hyperlipidemia LDL goal <100  Not on statin / lipid lowering medication prior to admission.  - Defer to outpatient management    Severe malnutrition " "int he context of neoplastic disease  Appreciate RD consultation 10/3/19.  High calorie/high protein supplement with meals recommended, and ice cream with evening snack.  - Supplements ordered.     Anxiety  Uses Ativan 0.5 mg bid prn, continue.     Gout, unspecified cause, unspecified chronicity, unspecified site  No evidence of current flare. Managed prior to admission with allopurinol 100 mg daily, continue.        Diet: Regular diet.  DVT Prophylaxis: Pneumatic Compression Devices  Tamez Catheter: Chronic.   Code Status: Full - discussed with patient and his daughter upon presentation, at this point he would like attempts at resuscitation, but not for prolonged amounts of time.    Discussion: Prostate biopsy is complete, and the result is pending.  Hemoglobin dropped from yesterday has been only 0.2 g despite ongoing gross hematuria.  I transfused him today at hemoglobin of 7.9, partly in anticipation that he might be discharged today.  We will recheck hemoglobin tomorrow.  He has been seen in Palliative Care consultation by Clifford Walsh NP.  She has added low-dose methylphenidate to treat his global fatigue, which she is tolerating fairly well.    Disposition: He has been accepted at [facility] tomorrow, time to be determined.  I discussed this with him; he is readily agreeable.    Attestation:  I have reviewed today's vital signs, notes, medications, labs and imaging.  Amount of time performed on this follow-up visit: 35 minutes.      Jose Flor MD       Interval History   \"I am doing okay\".  He has no pain.  He continues to feel globally weak, though was able to stand and ambulate a short distance with some assist today.  His appetite remains decreased, though he is able to swallow without difficulty, and eats without nausea or emesis.  His last bowel movement was a couple of days ago, though he does not feel constipated.    ROS:  CONSTITUTIONAL: NEGATIVE for chills, fever, sweats.  "   EYES: NEGATIVE for visual changes, eye irritation.  ENT/MOUTH: NEGATIVE for epistaxis, nasal congestion, postnasal drainage or sinus pressure.  RESP: NEGATIVE for dyspnea, cough, wheeze, or respiratory chest pain.  CV: NEGATIVE for chest pain, palpitations, orthopnea.  GI: NEGATIVE for rectal pain, difficulties swallowing, abdominal pain, diarrhea, nausea or vomiting.  Does not feel constipated.  : NEGATIVE for flank pain or suprapubic pain.  Mild discomfort from penis associated with Tamez catheter continues.  MUSCULOSKELETAL: NEGATIVE for back pain, joint pain, or joint swelling.  NEURO: NEGATIVE for focal numbness or weakness, stroke or seizure disorder.  Feels globally weak.  PSYCHIATRIC: NEGATIVE for anxiety or depression, panic, or recent change in mood.      Physical Exam   Temp:  [96.5  F (35.8  C)-98.6  F (37  C)] 96.9  F (36.1  C)  Pulse:  [50-61] 61  Heart Rate:  [51-54] 51  Resp:  [16-18] 18  BP: (113-137)/(42-53) 131/53  SpO2:  [94 %-97 %] 97 %    Weights:   Vitals:    09/30/19 1222 10/02/19 0945 10/03/19 0601   Weight: 67.1 kg (148 lb) 64.5 kg (142 lb 3.2 oz) 67 kg (147 lb 11.3 oz)    Body mass index is 21.81 kg/m .    GENERAL: Pleasant, slender, pale appearing man, lying in bed, looks comfortable.  EYES: Eyes grossly normal to inspection, extraocular movements intact  HENT: Nares patent bilaterally.  Nasal mucosa normal, no discharge.  NECK: Trachea midline, no stridor.   RESP: No accessory muscle use.  Symmetrical breath sounds.  Lungs clear throughout on inspiration and expiration.  Expiration not prolonged, no wheeze.  CV: Regular rate and rhythm, non-tachycardic.  Normal S1 S2, grade II/VI midsystolic murmur heard across the precordium, no extra sound.  No lower extremity edema.  ABDOMEN: Flat, soft, non-tender, no guarding.  Liver and spleen not enlarged, no masses palpable.  Bowel sounds positive.  MS: No bony deformities noted.  No red or inflamed joints.  SKIN: Warm and dry, no  randall.  NEURO: Alert, oriented, conversant.  Cranial nerves III - XII grossly intact.  No gross motor or sensory deficits.    : Tamez catheter noted, gross hematuria noted in urine in bag.  PSYCH: Calm, alert, conversant.  Able to articulate logical thoughts, no tangential thoughts, no hallucinations or delusions.  Affect normal.        Data   Recent Labs   Lab 10/03/19  0531 10/02/19  0515 10/01/19  0454 09/30/19  1237   WBC 9.1 8.9 8.1 9.0   HGB 7.9* 8.1* 6.9* 6.8*   MCV 90 89 91 96    189 183 198   NA  --  135 133 127*   POTASSIUM  --  5.0 4.9 4.4   CHLORIDE  --  104 103 97   CO2  --  24 22 21   BUN  --  33* 31* 35*   CR  --  1.25 1.20 1.34*   ANIONGAP  --  7 8 9   NUVIA  --  8.7 8.5 9.0   GLC  --  79 85 115*   ALBUMIN  --   --   --  2.9*   PROTTOTAL  --   --   --  6.8   BILITOTAL  --   --   --  0.2   ALKPHOS  --   --   --  64   ALT  --   --   --  13   AST  --   --   --  26   TROPI  --   --   --  <0.015       Recent Labs   Lab 10/02/19  0515 10/01/19  0454 09/30/19  1237   GLC 79 85 115*        Unresulted Labs Ordered in the Past 30 Days of this Admission     No orders found from 8/31/2019 to 10/1/2019.           Imaging  No results found for this or any previous visit (from the past 24 hour(s)).     I reviewed all new labs and imaging results over the last 24 hours. I personally reviewed no images or EKG's today.    Medications       allopurinol  100 mg Oral QPM     amLODIPine  10 mg Oral Daily     ampicillin  500 mg Oral TID     losartan  100 mg Oral Daily     methylphenidate  10 mg Oral BID     sodium chloride (PF)  3 mL Intracatheter Q8H     tamsulosin  0.4 mg Oral QPM       Jose Flor MD

## 2019-10-03 NOTE — PROGRESS NOTES
Reason for Follow up: discharge plan    Anticipated discharge needs: Met with patient and spoke with daughter, Martha via phone.  [facility] (Phone: [phone] Fax: [phone]) as early at 10/4.  Patient in agreement with plan.  Martha will plan to transport patient when ready.      Next steps: CTS to follow    Discharge Planner   Discharge Plans in progress: TCU  Barriers to discharge plan: medical stability  Follow up plan: CTS to follow       Entered by: Chanel Good 10/03/2019 4:44 PM     PAS-RR    Per DHS regulation, CTS team completed and submitted PAS-RR to [place] [employer] Direct Connect via the Senior [employer] Line. CTS team advised SNF and they are aware a PAS-RR has been submitted.     CTS team reviewed with pt or health care agent that they may be contacted for a follow up appointment within 10 days of hospital discharge if SNF stay is <30 days. Contact information for [employer] was also provided.     Pt or health care agent verbalized understanding.     PAS-RR # [id number]       Chanel Good RN  [facility] [phone]  [facility] [phone]

## 2019-10-03 NOTE — PROGRESS NOTES
Palliative Care Follow-up Clinic Note  Date of Admission:  9/30/2019   Visit Date:  October 3, 2019        HISTORY of PRESENT ILLNESS:  Andres Sow is a 85 year old year old male with a history of intermittent orlin hematuria and urinary obstruction over the past 4 months; he has been visiting with Dr Vega for this.  On the day of admission, he had an episode of syncope and concluded he should probably be evaluated at the hospital, so he asked a neighbor to bring him in.  During the course of his ED workup, he was found to have a 4/6 murmur, Hgb of 6.8, suspected metastatic prostate cancer with metastasis to T12, pelvis and lungs.  He was admitted and transfused.  He underwent a prostate biopsy yesterday with results expected in approximately one week.  He was referred to Palliative Care for his fatigue.        ASSESSMENT & PLAN:     Fatigue, likely neoplastic in origin  - after tolerating test dose of Ritalin 2.5 mg, increased dose to 5 mg po BID with the 2nd dose of the day to be given no later than 2pm  - 10/3: no benefit yet; willing to try 10 mg bid; I believe, however, that he appears more animated than at baseline     Anorexia with weight loss of 23 pounds (13%) in the past year  - Nutrition consult    Denies pain     Advance Care Planning:  - Understanding of condition:  Understands he probably has cancer, and that he has a murmur.  Had viewed his anorexia as normal aging.   - Decisional capacity:  intaact  - Code status:  Full Code  - Health Care Directive: NO; 10/3: recommended he make certain his daughters know his EOL wishes, ie, no machines.  Will provide him with a blank health care directive form.   - POLST:  No   - plan further discussion on Code status tomorrow     Goals of Care:  - Transitional Care following this hospitalization      Thank you for the opportunity to be of service to this patient and family.      Clifford Walsh (Ann) CNP  Palliative Care  Private cell:  [phone]        Face to face:   1230 - 1245, 15 min      ========================    SUBJECTIVE:  Feeling no different, no benefit, no side effects from Ritalin.      ROS:  As described in HPI and Subjective.  Otherwise, ALL are negative.    MEDICATIONS:  Allergies   Allergen Reactions     Hydrochlorothiazide Other (See Comments)     Low Potassium     Lisinopril Swelling     Neck swelling/airway     Metoprolol Other (See Comments)     He was seen in the emergency room for lightheadedness after I increased his metoprolol. Patient stopped taking it-noted 6/18/2019.      Scheduled meds:    allopurinol  100 mg Oral QPM     amLODIPine  10 mg Oral Daily     ampicillin  500 mg Oral TID     losartan  100 mg Oral Daily     methylphenidate  10 mg Oral BID     sodium chloride (PF)  3 mL Intracatheter Q8H     tamsulosin  0.4 mg Oral QPM     PRN meds:  sodium chloride 0.9%, acetaminophen, acetaminophen, lidocaine 4%, lidocaine (buffered or not buffered), LORazepam, melatonin, naloxone, ondansetron **OR** ondansetron, oxyCODONE, prochlorperazine **OR** prochlorperazine **OR** prochlorperazine, sodium chloride (PF)      OBJECTIVE:  Patient Vitals for the past 24 hrs:   BP Temp Temp src Pulse Heart Rate Resp SpO2 Weight   10/03/19 1100 116/46 98.6  F (37  C) Oral 56 -- 16 96 % --   10/03/19 0809 118/50 96.5  F (35.8  C) Oral 50 -- 16 96 % --   10/03/19 0601 -- -- -- -- -- -- -- 67 kg (147 lb 11.3 oz)   10/03/19 0255 137/46 98.4  F (36.9  C) Oral 50 -- 18 95 % --   10/02/19 2231 134/48 98.2  F (36.8  C) Axillary -- 51 16 94 % --   10/02/19 1900 127/49 98.1  F (36.7  C) Oral -- 54 16 95 % --   10/02/19 1515 123/49 98  F (36.7  C) Oral -- 56 16 96 % --       Wt Readings from Last 10 Encounters:   10/03/19 67 kg (147 lb 11.3 oz)   09/24/19 67.9 kg (149 lb 12.8 oz)   07/31/19 68.1 kg (150 lb 3.2 oz)   07/17/19 71.7 kg (158 lb)   06/18/19 71.7 kg (158 lb)   06/05/19 71.7 kg (158 lb)   06/03/19 71.7 kg (158 lb)   05/26/19 71.7 kg (158 lb)    05/24/19 71.7 kg (158 lb)   05/24/19 72.6 kg (160 lb)     Awake, alert, in no apparent distress   Ate most of the half sandwich served for lunch  Skin ashen; awaiting another PRBC  Tamez drainining purple-red blood     Intake/Output Summary (Last 24 hours) at 10/3/2019 1235  Last data filed at 10/3/2019 0602  Gross per 24 hour   Intake --   Output 1525 ml   Net -1525 ml         ROUTINE ICU LABS (Last four results)  CMP  Recent Labs   Lab 10/02/19  0515 10/01/19  0454 09/30/19  1237 09/26/19  1357    133 127*  --    POTASSIUM 5.0 4.9 4.4  --    CHLORIDE 104 103 97  --    CO2 24 22 21  --    ANIONGAP 7 8 9  --    GLC 79 85 115*  --    BUN 33* 31* 35*  --    CR 1.25 1.20 1.34*  --    GFRESTIMATED 52* 54* 48* 44*   GFRESTBLACK 60* 63 55* 54*   NUVIA 8.7 8.5 9.0  --    PROTTOTAL  --   --  6.8  --    ALBUMIN  --   --  2.9*  --    BILITOTAL  --   --  0.2  --    ALKPHOS  --   --  64  --    AST  --   --  26  --    ALT  --   --  13  --      CBC  Recent Labs   Lab 10/03/19  0531 10/02/19  0515 10/01/19  0454 09/30/19  1237   WBC 9.1 8.9 8.1 9.0   RBC 2.64* 2.72* 2.24* 2.11*   HGB 7.9* 8.1* 6.9* 6.8*   HCT 23.7* 24.3* 20.3* 20.2*   MCV 90 89 91 96   MCH 29.9 29.8 30.8 32.2   MCHC 33.3 33.3 34.0 33.7   RDW 15.3* 15.9* 14.6 12.6    189 183 198     INRNo lab results found in last 7 days.  Arterial Blood GasNo lab results found in last 7 days.    No results found for this or any previous visit (from the past 48 hour(s)).

## 2019-10-03 NOTE — PLAN OF CARE
Pt VSS-blood pressure runs a little low,pt tolerating.  Blood transfusion complete.  Pt ambulated today to door and back with assist of 1 and walker.  No complaints of pain.  Tolerating food and fluids.  Alert and oriented.  Enc to call for needs.

## 2019-10-03 NOTE — PLAN OF CARE
AOx4, up w/SBA and walker. Tamez draining bright to dark red urine, very tender during cleansing. Denies pain, VSS, rested intermittently throughout shift.

## 2019-10-04 NOTE — PLAN OF CARE
Report called to Adeline at [facility].  All questions answered.

## 2019-10-04 NOTE — PROGRESS NOTES
[facility] DISCHARGE NOTE    Patient discharged to transitional care unit at 4:25 PM via wheel chair. Accompanied by daughter and staff. Discharge instructions reviewed with patient and daughter, opportunity offered to ask questions. Prescriptions - None ordered for discharge. All belongings sent with patient.    Kandi Yo RN

## 2019-10-04 NOTE — DISCHARGE SUMMARY
[facility]    Discharge Summary  Hospital Medicine    Date of Admission:  9/30/2019  Date of Discharge:  10/4/2019   Discharging Provider: Jose Flor  Date of Service: 10/4/2019      Primary Care     Melinda Shanksrosalva Schultz  [id number]  [facility] [address]      Identification and Chief Compaint:  Andres Sow is a 85 year old male admitted on 9/30/2019. He is being admitted for further evaluation and treatment of a syncopal episode and anemia, as well as work-up of probable metastatic prostate malignancy.     Discharge Diagnoses       Suspected malignant neoplasm    Hyperlipidemia LDL goal <100    Anxiety    Hypertension goal BP (blood pressure) < 150/90    CKD (chronic kidney disease) stage 3, GFR 30-59 ml/min (H)    Aortic valve stenosis, severe    Syncope    Anemia due to hematuria    Urinary tract infection    History of gout    Urinary retention    Discharge Disposition   Discharged to home    Discharge Orders      CARDIOLOGY EVAL ADULT REFERRAL      General info for SNF    Length of Stay Estimate: Short Term Care: Estimated # of Days <30  Condition at Discharge: Improving  Level of care:skilled   Rehabilitation Potential: Fair  Admission H&P remains valid and up-to-date: Yes  Recent Chemotherapy: N/A  Use Nursing Home Standing Orders: Yes     Mantoux instructions    Give two-step Mantoux (PPD) Per Facility Policy Yes     Reason for your hospital stay    You passed out prior to admission, so were brought in for evaluation.  It turns out the you had a very low hemoglobin at 6.8 (your hemoglobin usually stands right around 10).  You received a total of 3 units of blood during this hospital stay, which resolved that problem of fainting.  While you were here, Dr. Vega performed a prostate biopsy, though the results of that are still pending, and may not be available until next week.  We spent the latter part of the hospital stay trying to build up her strength and  mobility once again.  Although you have made some improvements in that regard, you need some additional therapy, and will be [facility] to [facility] that.    Dr. Vega would like to see you back in his office in about 2 weeks, to discuss the biopsy results and discuss treatment plans.  We will help you make that appointment.     Tamez catheter    To straight gravity drainage. Change catheter every 2 weeks and PRN for leaking or decreased uring output with signs of bladder distention. DO NOT change catheter without a specific MD order IF diagnosis of benign prostatic hypertrophy (BPH), neurogenic bladder, or other urological conditions     Follow Up and recommended labs and tests    Follow up with group home physician.  The following labs/tests are recommended: Please follow Hgb.  He continues to have gross hematuria, and may eventually require additional transfusion.     Activity - Up with nursing assistance     Full Code     Physical Therapy Adult Consult    Evaluate and treat as clinically indicated.    Reason: Globally weak and deconditioned due to underlying neoplasm and prolonged hospital stay.     Occupational Therapy Adult Consult    Evaluate and treat as clinically indicated.    Reason: Globally weak, poor mobility, and poor ability to independently perform ADLs, due to underlying neoplasm and prolonged hospital stay.     Wheelchair Order    I, the undersigned, certify that the above prescribed supplies are medically necessary for this patient and is both reasonable and necessary in reference to accepted standards of medical and necessary in reference to accepted standards of medical practice in the treatment of this patient's condition and is not prescribed as a convenience.      Advance Diet as Tolerated    Follow this diet upon discharge: Orders Placed This Encounter      Snacks/Supplements Adult: Boost Plus; With Meals      Regular Diet Adult         Further instructions from your care  "team       As a system [place] wants to ensure that across the care continuum that you have support through care coordination services that include nurses and social workers in the outpatient setting.  Due to your hospitalization I feel it is important you have this support when you discharge.  They will be calling you within 24-48 hours of your discharge.   A brochure describing the services was provided.  If you have questions you can reach out to your clinic directly and ask for the Care Coordinator assigned to your care.  Rocio George RN, Care Coordinator [phone]    ++++++++++++++++++++++++++++++++++++++++++++++++++++++++++++++++++++++++++    From Clifford Walsh, Palliative Care NP, Cell [phone]      The echocardiogram that you had showed you have \"severe aortic stenosis,\" which is a tightening of one of your heart valves making your heart work harder to try to push the blood through it.  This may be contributing somewhat to your overall low energy.  Call the [facility] (staffed by cardiologists from the [facility]) to schedule an appointment.  The cardiologists can explain all the treatment options for that condition.  Another reason to do this is because this condition may have contributed to your collapse at home.  To schedule your appointment with Cardiology, call [phone].  The Cardiology Clinic is on the 2nd floor of the hospital; there is an elevator near to pharmacy that will take you up to the 2nd floor close to the Cardiology Clinic.     I recommend that you avoid Ibuprofen, Advil and Naproxen while you are passing bloody urine.      For your back pain, you may take up to 3,000 mg of Tylenol (Acetominophen) per day in divided doses.  It is ABSOLUTELY safe for you to take the Oxycodone that Dr Vega prescribed for pain; if you are concerned, start with just on half tab and, if needed, take the other half tab an hour later.      Please ADD one Vanilla Boost per " day to your diet.  You might even need to drink a couple of them each day to prevent further weight loss.                 Discharge Medications   Current Discharge Medication List      START taking these medications    Details   methylphenidate (RITALIN) 10 MG tablet Take 1 tablet (10 mg) by mouth 2 times daily  Qty: 60 tablet, Refills: 0    Associated Diagnoses: Neoplastic (malignant) related fatigue      order for DME Equipment being ordered: Wheelchair    Dx:  Severe aortic stenosis, neoplastic fatigue  Qty: 1 each, Refills: 0    Associated Diagnoses: Aortic valve stenosis, etiology of cardiac valve disease unspecified; Neoplastic (malignant) related fatigue      oxyCODONE (ROXICODONE) 5 MG tablet Take 1 tablet (5 mg) by mouth every 4 hours as needed  Qty: 20 tablet, Refills: 0    Associated Diagnoses: Suspected malignant neoplasm         CONTINUE these medications which have CHANGED    Details   LORazepam (ATIVAN) 0.5 MG tablet Take 1 tablet (0.5 mg) by mouth 2 times daily as needed for anxiety  Qty: 20 tablet, Refills: 0    Associated Diagnoses: Anxiety         CONTINUE these medications which have NOT CHANGED    Details   allopurinol (ZYLOPRIM) 100 MG tablet Take 1 tablet (100 mg) by mouth daily  Qty: 90 tablet, Refills: 3    Associated Diagnoses: Gout, unspecified cause, unspecified chronicity, unspecified site      amLODIPine (NORVASC) 10 MG tablet Take 1 tablet (10 mg) by mouth daily  Qty: 90 tablet, Refills: 3    Associated Diagnoses: Essential hypertension, benign      losartan (COZAAR) 100 MG tablet Take 1 tablet (100 mg) by mouth daily  Qty: 90 tablet, Refills: 3    Associated Diagnoses: Essential hypertension, benign      Multiple Vitamins-Minerals (PRESERVISION AREDS 2) CAPS Take 1 capsule by mouth 2 times daily       tamsulosin (FLOMAX) 0.4 MG capsule Take 1 capsule (0.4 mg) by mouth daily  Qty: 90 capsule, Refills: 3      vitamin D3 (CHOLECALCIFEROL) 2000 units (50 mcg) tablet Take 1 tablet by mouth  every evening         STOP taking these medications       ampicillin (PRINCIPEN) 500 MG capsule Comments:   Reason for Stopping:         aspirin (ASA) 81 MG EC tablet Comments:   Reason for Stopping:         finasteride (PROSCAR) 5 MG tablet Comments:   Reason for Stopping:         naloxone (NARCAN) 4 MG/0.1ML nasal spray Comments:   Reason for Stopping:         oxyCODONE-acetaminophen (PERCOCET) 5-325 MG tablet Comments:   Reason for Stopping:             Allergies   Allergies   Allergen Reactions     Hydrochlorothiazide Other (See Comments)     Low Potassium     Lisinopril Swelling     Neck swelling/airway     Metoprolol Other (See Comments)     He was seen in the emergency room for lightheadedness after I increased his metoprolol. Patient stopped taking it-noted 6/18/2019.        Consultations This Hospital Stay   Consultation during this admission received from:    OCCUPATIONAL THERAPY ADULT IP CONSULT  PHYSICAL THERAPY ADULT IP CONSULT  SOCIAL WORK IP CONSULT  UROLOGY IP CONSULT  PALLIATIVE CARE ADULT IP CONSULT  NUTRITION SERVICES ADULT IP CONSULT  PHYSICAL THERAPY ADULT IP CONSULT  OCCUPATIONAL THERAPY ADULT IP CONSULT    Significant Results and Procedures   Procedures:    Needle biopsy of prostate  Appointment source: Established Patient  Patient name: Andres Sow  Urology Staff: Robert Vega MD     Subjective: This is a 85 year old year old male returning for follow up of pelvic mass anatomically consistent with prostate cancer despite PSA of 2.8 ng/mL     Objective:  Needle of the prostate was obtained using both ultrasound guidance and direct palpation in view of the distortion of the anatomy.     The ultrasound view was difficult to interpret.     Assessment:  Pelvic mass suspicious for metastatic prostate cancer.     Plan:  Continue indwelling bladder drainage and review pathology when available.    Data   Results for orders placed or performed during the hospital encounter of 09/30/19   CT Chest  Pulmonary Embolism w Contrast    Narrative    CT CHEST WITH CONTRAST  9/30/2019 2:05 PM    HISTORY: Evaluate pulmonary nodules seen on a recent CT of the abdomen  and pelvis.     COMPARISON: A CT of the abdomen and pelvis on 9/26/2019 which included  the lower chest.    TECHNIQUE: Routine transverse CT imaging of the chest was performed  following the uneventful intravenous administration of 68 mL Isovue  370 contrast. Radiation dose for this scan was reduced using automated  exposure control, adjustment of the mA and/or kV according to patient  size, or iterative reconstruction technique.    FINDINGS: The heart size is normal.No enlarged lymph node or other  abnormal mediastinal mass is seen. There is calcification within the  thoracic aorta. The vascular structures are otherwise unremarkable.  There are innumerable nodules seen diffusely throughout both lungs.  The largest is a collection of nodules in the left midlung measuring  up to 2.4 cm demonstrated on series 4 image 68. On the right there is  a 1.7 cm long nodule in the right infrahilar region on series 4 image  74. There is an additional 2.7 cm long mass just to the right of the  heart border on series 4 image 116. There are numerous smaller nodular  densities seen diffusely throughout both lungs. The lungs are  otherwise clear with no consolidation or infiltrate seen. No  pneumothorax is demonstrated. No pleural effusion is identified. There  are degenerative changes in the spine. The coronal series 6 image 94  suggests a 2.3 cm area of increased density in the right aspect of the  T12 vertebral body. This is not calcified. No other osseous  abnormality is noted. No chest wall pathology is seen. The visualized  upper abdomen is unremarkable.      Impression    IMPRESSION:   1. There are innumerable bilateral pulmonary nodules as described  above. Metastatic disease needs to be considered.  2. There is a 2.3 cm area of increased attenuation within the  right  aspect of the T12 vertebral body. This is a nonspecific finding but  metastatic involvement needs to be considered.    KRISTINE JARVIS MD       History of Present Illness   (From H&P) Andres Sow is a 85 year old male who presented to the emergency department after a syncopal episode.     He woke this morning and had completed a few morning tasks. He was feeling slightly lightheaded and generally weak. He had the morning news on TV. He was walking and without prodrome the next thing he knew he was waking on the floor. He had loss of consciousness but does not think it was for more than a few seconds based on the time on the TV. He denies any acute pain after the fall other than some left thigh pain, which he attributes possibly to laying in the hospital bed. No headache, vision changes, or cognitive changes. Because of the syncope, he presented to the emergency department.     He has been having hematuria and urinary retention for 3-4 months, has been following with urology Dr. Vega in clinic. He has had a chronic Tamez for the past few months. Until recently there was not concern for prostate cancer.      Over the past few weeks he has noted new fatigue and general weakness. He has been unable to walk as far as he used to without becoming fatigued.     On 9/26 he had a CT abdomen & pelvis showing evidence of possible metastatic cancer. He was also diagnosed with a UTI and started on ampicillin. This was going to be managed outpatient until he had his syncopal episode.     On review of systems he notes a 10-12 pound unintentional weight loss over the past few months. No abdominal pain, but notes some mild intermittent bladder discomfort in the past few weeks. He has some constipation which he occasionally struggled with for many years, but worse recently. He noted some low back pain the past few weeks as well. The remainder review of systems is negative.       Hospital Course   Andres Sow was  admitted on 9/30/2019.  The following problems were addressed during his hospitalization:    Suspected malignant neoplasm with metastasis  Two recent CT's with evidence of likely metastatic malignancy (CT abdomen & pelvis on 9/26 - presumed prostate malignancy, T12 osseous lesion, and lung nodules; CT PE study 9/30 with innumerable pulmonary nodules and area of increased attenuation of T12). Evaluated by urology (Dr. Vega) who reported prostate exam is abnormal, and who performed biopsy 10/1/19.  Pathology likely to be available in about a week.  - Await biopsy results.  - Dr. Vega requests follow-up in approx 2 weeks after discharge.    ADDENDUM 10/4/19, 1620: The histology is back from the patient's 10/1/2019 prostate biopsy.  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  I discussed this with Dr. Vega.  He feels that prostate origin is very unlikely.  Therefore, we have made a referral for the patient to see Dr. Hebert, Oncology, on 10/9/2019.  I met with the patient, his daughter, and his son-in-law, this afternoon to explain these pathology results in basic terms, and to let them know about the oncology referral.  I did my best to answer questions, and by the end of our visit, they offered no additional questions.       Syncope  Syncopal episode at home 9/30/2019. Has both severe acute on chronic anemia and severe aortic stenosis (see below) as potential contributors.  No complaints of pain following the fall. Since transfusion to Hgb 8.1, he has been up and ambulating without dizziness, pre-syncope, or syncope, arguing that the anemia is probably the more important contributor to his preadmission syncopal event.  - Address anemia as below.  - See discussion of aortic stenosis below.     Aortic stenosis, severe by echocardiogram  Echocardiogram was ordered to evaluate the preadmission syncopal event.  On the 10/1/2019 echocardiogram, he was found to have  "severe aortic stenosis, with a mean aortic gradient of 43 mmHg, and an aortic valve area of 0.7 cm .  The patient has no history of angina, no history of congestive heart failure, and the syncopal event prior to this admission, which occurred at a hemoglobin of 6.8, represents his only episode of presyncope or syncope.  Therefore, there seems to be no urgent clinical need to refer him to cardiology or cardiac surgery, though this will need to be monitored.  - Discussed the echo results with the patient, answered his questions.  - Continue to monitor for the development of associated symptoms, and consider interval echocardiography in about 6 months.     Anemia, acute on chronic, due to hematuria  Recent baseline Hgb between 9 - 10, admit Hgb 6.8 --> 8.1 after 2 unit PRBC, then 7.9 --> 1 unit(s) PRBC --> 9.0 this morning. Still has gross hematuria from probable prostate cancer, so Hgb will need to be monitored after discharge.  - Follow Hgb as outpatient, transfuse at Hgb < 7.0 or if symptomatic.     Recent urinary tract infection  Chronic urinary retention  Patient has chronic Tamez, last changed in clinic 6 days prior to admission. Had E.coli positive urine culture sensitive to ampicillin, was started on ampicillin 500 mg tid on 9/27/19.  Was afebrile, no leukocytosis (WBC 9.0) upon admission.  Repeat admit UA positive for small leukocyte esterase and hematuria (WBC > 182, RBC > 182, \"large\" blood).  Subsequent culture was no growth.    - Course of ampicillin complete as of today 10/4/19.  - Continue chronic Tamez.  - Continue prior to admission tamsulosin.  I stopped finasteride, as I don't think there's probably much benefit in what appears to be prostate cancer rather than benign hypertrophy.       CKD (chronic kidney disease) stage 3, GFR 30-59 ml/min  Baseline GFR 44 - 55 in recent months, admission GFR 48 --> 54 --> 52 --> 47 today.  Renal function at baseline.  - Monitor GFR.  - Avoid " "nephrotoxins.     Hypertension goal BP (blood pressure) < 150/90  Managed prior to admission with amlodipine 10 mg daily and losartan 100 mg daily, continued here.  BP control here has been good.  - Continue prior to admission amlodipine and losartan.      Hyperlipidemia LDL goal <100  Not on statin / lipid lowering medication prior to admission.  - Defer to outpatient management     Severe malnutrition int he context of neoplastic disease  Appreciate RD consultation 10/3/19.  High calorie/high protein supplement with meals recommended, and ice cream with evening snack.  - Supplements ordered, continue at discharge.     Anxiety  Uses lorazepam 0.5 mg BID PRN at home, though has needed no doses here.      Gout, unspecified cause, unspecified chronicity, unspecified site  No evidence of current flare. Managed prior to admission with allopurinol 100 mg daily, continue.    Palliative issues  Given likelihood that Mr. Sow has underlying cancer, he was evaluated by Clifford Chester, DONI, Palliative Care, this hospital stay.  She added methylphenidate to treat generalized fatigue associated with neoplasm, and requested the registered dietitian consultation which is discussed above.  She spoke with the patient about his resuscitative wishes.  At the time of discharge, he remains a full CODE STATUS, though would choose only brief efforts, and no prolonged mechanical ventilation.  Ms. Walsh spoke in more detail about this to him, and although he is leaning toward \"no machines\", he has not yet made a decision to be DNR.  He was provided with a blank healthcare directive form which remains blank at this time.  - Continue methylphenidate.    Pending Results   Unresulted Labs Ordered in the Past 30 Days of this Admission     No orders found from 8/31/2019 to 10/1/2019.          Physical Exam   Temp:  [96.5  F (35.8  C)-98.6  F (37  C)] 98.3  F (36.8  C)  Pulse:  [47-61] 57  Resp:  [16-18] 18  BP: (113-150)/(42-54) " 150/45  SpO2:  [95 %-97 %] 95 %  Vitals:    10/02/19 0945 10/03/19 0601 10/04/19 0601   Weight: 64.5 kg (142 lb 3.2 oz) 67 kg (147 lb 11.3 oz) 66.5 kg (146 lb 9.7 oz)       GENERAL: Pleasant, slender, pale appearing man, lying in bed, looks comfortable.  RESP: No accessory muscle use.  Symmetrical breath sounds.  Lungs clear throughout on inspiration and expiration.  Expiration not prolonged, no wheeze.  CV: Regular rate and rhythm, non-tachycardic.  Normal S1 S2, grade II/VI midsystolic murmur heard across the precordium, no extra sound.  No lower extremity edema.  ABDOMEN: Flat, soft, non-tender, no guarding.  Liver and spleen not enlarged, no masses palpable.  Bowel sounds positive.  NEURO: Alert, oriented, conversant.  Cranial nerves III - XII grossly intact.  No gross motor or sensory deficits.    : Tamez catheter noted, gross hematuria noted in urine in bag.  PSYCH: Calm, alert, conversant.  Able to articulate logical thoughts, no tangential thoughts, no hallucinations or delusions.  Affect normal.    The discharge plan was discussed with the patient who expressed agreement.    Total time on this discharge was 35 minutes.    Jose Flor MD

## 2019-10-04 NOTE — TELEPHONE ENCOUNTER
ONCOLOGY INTAKE: Records Information      APPT INFORMATION:  Referring provider: Dr. Jose Flor  Referring provider s clinic:  [facility]  Reason for visit/diagnosis: Malignant Neoplasm  Has patient been notified of appointment date and time?: yes    RECORDS INFORMATION:  Were the records received with the referral (via [employer])? no    Has patient been seen for any external appt for this diagnosis? no    If yes, where? n/a    Has patient had any imaging or procedures outside of [facility] for this condition? no      If Yes, where? n/a    ADDITIONAL INFORMATION:  Records in Epic

## 2019-10-04 NOTE — DISCHARGE INSTRUCTIONS
"As a system [facility] wants to ensure that across the care continuum that you have support through care coordination services that include nurses and social workers in the outpatient setting.  Due to your hospitalization I feel it is important you have this support when you discharge.  They will be calling you within 24-48 hours of your discharge.   A brochure describing the services was provided.  If you have questions you can reach out to your clinic directly and ask for the Care Coordinator assigned to your care.  Rocio George RN, Care Coordinator [phone]    ++++++++++++++++++++++++++++++++++++++++++++++++++++++++++++++++++++++++++    From Clifford Walsh, Palliative Care NP, Cell [phone]      The echocardiogram that you had showed you have \"severe aortic stenosis,\" which is a tightening of one of your heart valves making your heart work harder to try to push the blood through it.  This may be contributing somewhat to your overall low energy.  Call the [facility] (staffed by cardiologists from the [facility]) to schedule an appointment.  The cardiologists can explain all the treatment options for that condition.  Another reason to do this is because this condition may have contributed to your collapse at home.  To schedule your appointment with Cardiology, call [phone].  The Cardiology Clinic is on the 2nd floor of the hospital; there is an elevator near to pharmacy that will take you up to the 2nd floor close to the Cardiology Clinic.     I recommend that you avoid Ibuprofen, Advil and Naproxen while you are passing bloody urine.      For your back pain, you may take up to 3,000 mg of Tylenol (Acetominophen) per day in divided doses.  It is ABSOLUTELY safe for you to take the Oxycodone that Dr Vega prescribed for pain; if you are concerned, start with just on half tab and, if needed, take the other half tab an hour later.      Please ADD one Vanilla Boost per day to " "your diet.  You might even need to drink a couple of them each day to prevent further weight loss.      A new cream/ointment called Lidocaine was ordered which you can apply to the tip of your penis if you have pain there.    You were started on a new medicine called Methophenidate whose sole purpose is to give you some energy.  The current dose is 10 mg.  You should take it first thing in the morning and again around noon/1pm/2pm.  Avoid taking it much after 2pm as it could keep you awake at night.  This medicine requires a hand-signed prescription from your provider.  If you find it not helpful, you may stop it abruptly.  Dr Flor also thinks it has been beneficial for you though.      Talk to your girls about what you do and do not want to be put through at the end of your life.  You can find online healthcare directives by \"googling\" the phrase \"[employer]oring [employer] [place].      ++++++++++++++++++++++++++++++++++++++++++++++++++++++++++++++++++++++++++   "

## 2019-10-04 NOTE — PLAN OF CARE
Occupational Therapy Discharge Summary    Reason for therapy discharge:    Discharged to transitional care facility.    Progress towards therapy goal(s). See goals on Care Plan in [facility] electronic health record for goal details.  Goals partially met.  Barriers to achieving goals:   discharge from facility.    Therapy recommendation(s):    Continued therapy is recommended.  Rationale/Recommendations:  TCU to increase independence with ADLs and functional mobility .

## 2019-10-04 NOTE — PLAN OF CARE
Alert and oriented.   Oxycodone given for neck pain.  Bright red urine in light. Small amount of dried blood from penis.  Penis tender to touch.   Bradycardic.  Bed alarm on for safety.

## 2019-10-04 NOTE — PROGRESS NOTES
Name: Andres Sow    MRN#: [id number]    Reason for Hospitalization: Undiagnosed cardiac murmurs [R01.1]  Pelvic mass [R19.00]  Pulmonary nodules [R91.8]  Syncope, unspecified syncope type [R55]  Anemia, unspecified type [D64.9]    Discharge Date: 10/4/2019    Patient / Family response to discharge plan: Met with patient, spoke with daughter, Martha via phone.  Plan to discharge to [facility] (Phone: [phone] Fax: [phone]) today.  Martha will transport, she plans to arrive at 1530 today.      Other Providers (Care Coordinator, County Services, PCA services etc): No    CTS Hand Off Completed: Yes: completed    PAS #: [id number]    GARTH Score: average    Future Appointments:   Future Appointments   Date Time Provider Department Center   10/15/2019 10:00 AM LANEY Vega MD [facility]       Discharge Disposition: transitional care unit    Discharge Planner   Discharge Plans in progress: completed: TCU  Barriers to discharge plan: none  Follow up plan: TCU for therapies/PCP/oncology       Entered by: Chanel Good 10/04/2019 10:17 AM

## 2019-10-07 PROBLEM — C80.1 MALIGNANT NEOPLASM (H): Status: ACTIVE | Noted: 2019-01-01

## 2019-10-07 PROBLEM — R31.0 GROSS HEMATURIA: Status: ACTIVE | Noted: 2019-01-01

## 2019-10-07 PROBLEM — D63.0 ANEMIA IN NEOPLASTIC DISEASE: Status: ACTIVE | Noted: 2019-01-01

## 2019-10-07 PROBLEM — D62 ANEMIA DUE TO BLOOD LOSS, ACUTE: Status: ACTIVE | Noted: 2019-01-01

## 2019-10-07 NOTE — TELEPHONE ENCOUNTER
RECORDS STATUS - ALL OTHER DIAGNOSIS      RECORDS RECEIVED FROM: Epic/CE   DATE RECEIVED: 10/9/2019    NOTES STATUS DETAILS   OFFICE NOTE from referring provider Complete  Dr. Jose Flor   OFFICE NOTE from medical oncologist N/A    DISCHARGE SUMMARY from hospital Complete EPIC   DISCHARGE REPORT from the ER See Above     OPERATIVE REPORT N/A    MEDICATION LIST Complete [facility]   CLINICAL TRIAL TREATMENTS TO DATE N/A    LABS     PATHOLOGY REPORTS Complete [facility] - Prostate 10/1/2019    ANYTHING RELATED TO DIAGNOSIS Complete EPIC   GENONOMIC TESTING     TYPE:     IMAGING (NEED IMAGES & REPORT)     CT SCANS Complete PACS   MRI     MAMMO     ULTRASOUND     PET       Action    Action Taken 10/7/2019 4:23pm     Called pt Andres to see if he has any outside records ([facility])? Pt didn't answer phone.

## 2019-10-07 NOTE — PROGRESS NOTES
Clinic Care Coordination Contact  Care Coordination Transition Communication    Referral Source: IP Report    Clinical Data: Patient was hospitalized at [facility] from 9/30/19 to 10/4/19 with diagnosis of syncopal episode and anemia. Probable metastatic prostate malignancy     Transition to Facility:              Fac[facility] Name: [facility]               Contact name and phone number/fax: [place] [phone]    Plan: RN/SW Care Coordinator will await notification from facility staff informing RN/SW Care Coordinator of patient's discharge plans/needs. RN/SW Care Coordinator will review chart and outreach to facility staff every 4 weeks and as needed.     Namita Parr RN, CCM - Primary Care Clinic RN Coordinator  [facility]   10/7/2019    11:38 AM  [phone]

## 2019-10-07 NOTE — LETTER
"    10/7/2019        RE: Andres Sow  [address]  [place] [address]        [facility]  PRIMARY CARE PROVIDER AND CLINIC:  Eveline Shanks MD, [address] [facility] [address]  Chief Complaint   Patient presents with     Establish Care     [facility] Medical Record Number:  [id number]  Place of Service where encounter took place:  [facility] [[id number]]    Andres Sow  is a 85 year old  (11/21/1933), admitted to the above facility from  [facility]. Hospital stay 9/30/19 through 10/5/19..  Admitted to this facility for  rehab, medical management and nursing care.    HPI:    HPI information obtained from: facility chart records, facility staff, patient report and [facility] chart review.   Brief Summary of Hospital Course: Andres Sow is an 85-year-old gentleman with past medical history of HTN, HLD, CKD stage III, chronic indwelling Tamez catheter, who was admitted after syncopal episode at home.  He had been having hematuria for 3 to 4 months, with reports of worsening fatigue over the past few weeks.  Abdominal CT on 9/26 showed evidence of possible metastatic cancer.  On that same date he was also diagnosed with a UTI and treated with a course of ampicillin.  Hemoglobin in ED was 6.8.  Received 2 units of packed cells, hemoglobin improved to 8.1,, dropped again to 7.1 is transfused an additional 1 unit PRBC.  Underwent prostate biopsy on 10/1, pathology revealed high-grade carcinoma, favoring large cell neuroendocrine carcinoma.  Urology Dr. Vega felt prostate origin unlikely.  Echo on 10/1 showed severe aortic stenosis.  No previous history of heart failure.  Reports he had been diagnosed with a \"murmur\" during physical earlier this year.  Urine culture while inpatient had no growth.  Completed ampicillin on 10/4/2019.  Blood pressure stable.  Was seen by palliative care, started on methylphenidate for " fatigue.  Discussed CODE STATUS, opted to remain full code, however was considering changing to DNR.  When medically stable was discharged to TCU for further rehab and medical management.  Updates on Status Since Skilled nursing Admission:   Continues to have significant hematuria and catheter.  States did lighten briefly over the weekend, but urine has remained consistently dark red for the past 36 hours.  Hemoglobin has dropped in in TCU 9.1-8.5->7.6.  Denies any lightheaded is a dizziness or presyncopal symptoms.  Denies any pain, with some mild discomfort in back which he attributes to the bed.  Reports he's appetite is at baseline, but does admit to losing about 12 pounds in the past few months.  Did not sleep well last night, states she could not get comfortable.  Denies any chest pain or shortness of breath.  Has been afebrile.  Denies any discomfort with his catheter.  Is concerned he will not have the energy to work with therapy today.    CODE STATUS/ADVANCE DIRECTIVES DISCUSSION:   CPR/Full code   Patient's living condition: lives alone  ALLERGIES: Hydrochlorothiazide; Lisinopril; and Metoprolol  PAST MEDICAL HISTORY:  has a past medical history of Hypertension and Unspecified vertiginous syndromes and labyrinthine disorders.  PAST SURGICAL HISTORY:   has a past surgical history that includes REMOVAL OF TONSILS,<13 Y/O and colonoscopy (8/30/2007).  FAMILY HISTORY: family history includes Breast Cancer in his sister; Cerebrovascular Disease in his sister; Hypertension in his father.  SOCIAL HISTORY:   reports that he quit smoking about 41 years ago. His smoking use included cigarettes, pipe, and cigars. He quit after 25.00 years of use. He has never used smokeless tobacco. He reports current alcohol use of about 60.0 standard drinks of alcohol per week. He reports that he does not use drugs.    Post Discharge Medication Reconciliation Status: discharge medications reconciled and changed, per note/orders  (see AVS)    Current Outpatient Medications   Medication Sig Dispense Refill     acetaminophen (TYLENOL) 500 MG tablet Take 1,000 mg by mouth 3 times daily       allopurinol (ZYLOPRIM) 100 MG tablet Take 1 tablet (100 mg) by mouth daily 90 tablet 3     amLODIPine (NORVASC) 10 MG tablet Take 1 tablet (10 mg) by mouth daily 90 tablet 3     lidocaine (XYLOCAINE) 2 % external gel as needed for moderate pain Apply to Tip of penis topically as needed for pain Up to four times daily       LORazepam (ATIVAN) 0.5 MG tablet Take 1 tablet (0.5 mg) by mouth 2 times daily as needed for anxiety 20 tablet 0     losartan (COZAAR) 100 MG tablet Take 1 tablet (100 mg) by mouth daily 90 tablet 3     melatonin 3 MG tablet Take 1 mg by mouth nightly as needed for sleep       methylphenidate (RITALIN) 10 MG tablet Take 1 tablet (10 mg) by mouth 2 times daily 60 tablet 0     Multiple Vitamins-Minerals (PRESERVISION AREDS 2) CAPS Take 1 capsule by mouth 2 times daily        order for DME Equipment being ordered: Wheelchair    Dx:  Severe aortic stenosis, neoplastic fatigue 1 each 0     oxyCODONE (ROXICODONE) 5 MG tablet Take 1 tablet (5 mg) by mouth every 4 hours as needed 20 tablet 0     tamsulosin (FLOMAX) 0.4 MG capsule Take 1 capsule (0.4 mg) by mouth daily 90 capsule 3     vitamin D3 (CHOLECALCIFEROL) 2000 units (50 mcg) tablet Take 1 tablet by mouth every evening         ROS:  10 point ROS of systems including Constitutional, Eyes, Respiratory, Cardiovascular, Gastroenterology, Genitourinary, Integumentary, Musculoskeletal, Psychiatric were all negative except for pertinent positives noted in my HPI.    Vitals:  /48   Pulse 51   Temp 97.6  F (36.4  C)   Resp 18   Wt 65.8 kg (145 lb)   SpO2 95%   BMI 21.41 kg/m     Exam:  GENERAL APPEARANCE:  Alert, in no distress, oriented, thin, cooperative, pale  RESP:  respiratory effort and palpation of chest normal, lungs clear to auscultation , no respiratory distress, on RA  CV:   Palpation and auscultation of heart done , regular rate and rhythm, no murmur, rub, or gallop, no edema, +2 pedal pulses  ABDOMEN:  no guarding or rebound, bowel sounds normal, soft, non-tender  :    Tamez catheter in place, moderate amount of dark red urine with small clots in bag  M/S:   Generalized weakness, no gross joint deformities  SKIN:  Pale, no open sores or lesions noted  NEURO:   Cranial nerves 2-12 are normal tested and grossly at patient's baseline  PSYCH:  oriented X 3, normal insight, judgement and memory, affect and mood normal    Lab/Diagnostic data:  Recent labs in [facility] reviewed by me today.  and   Most Recent 3 CBC's:  Recent Labs   Lab Test 10/07/19  0734 10/06/19  1030 10/04/19  0449 10/03/19  0531 10/02/19  0515   WBC  --   --  9.5 9.1 8.9   HGB 7.6* 8.5* 9.0* 7.9* 8.1*   MCV  --   --  90 90 89   PLT  --   --  198 187 189     Most Recent 3 BMP's:  Recent Labs   Lab Test 10/04/19  0449 10/02/19  0515 10/01/19  0454    135 133   POTASSIUM 4.8 5.0 4.9   CHLORIDE 103 104 103   CO2 24 24 22   BUN 40* 33* 31*   CR 1.36* 1.25 1.20   ANIONGAP 8 7 8   NUVIA 8.4* 8.7 8.5   GLC 92 79 85       ASSESSMENT/PLAN:  (R31.0) Gross hematuria  (primary encounter diagnosis)  (D62) Anemia due to blood loss, acute  (C80.1) Malignant neoplasm (H)  (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  CT on 926 showed presume prostate malignancy, T12 osteosis lesion and lung nodules.  Continues to have significant hematuria, hemoglobin dropped approximately 1 g in last 24 hours.  Vital signs stable, but does have significant fatigue.  Recent urine culture negative. Discussed benefits and risks of blood transfusion with patient, consent form signed.  Coordinated with nursing staff who have scheduled blood transfusion.  Plan: Given active bleeding and drop in hemoglobin, will order 2 units of packed red cells with 20 mg of IV Lasix in between -transfusion scheduled for  10/9.  Monitor for symptoms of anemia, and send to ER if becomes unstable.  Plan to see oncology Dr. Hebert on 10/9 to establish care.  Recheck hemoglobin on 10/10 after transfusion.  Follow-up with neurology in 2 weeks.    (R55) Syncope, unspecified syncope type  Comment: Likely due to to anemia.  Plan: Monitor vital signs, anemia management as above.    (E43) Severe protein-calorie malnutrition (H)  Comment: Recent unintentional weight loss, BMI 21.4.  Plan: Dietitian to to follow, recommend dietary supplements    (I10) Hypertension goal BP (blood pressure) < 150/90  (E78.5) Hyperlipidemia LDL goal <100  Comment: SBP 110s to 130s in TCU.  Stable during recent hospitalization.  Plan: Continue losartan 100 mg daily, amlodipine 10 mg daily.  Will add hold parameters.  Continue to monitor.    (F41.9) Anxiety  Comment: Seen by palliative care, reports minimal use of Ativan.  Plan: Continue lorazepam 0.5 mg twice daily as needed x14 days, then will reevaluate for further need    (N18.3) CKD (chronic kidney disease) stage 3, GFR 30-59 ml/min (H)  Comment: Baseline GFR 50-55, creatinine ~2, stable during recent hospitalization  Plan: Avoid nephrotoxic medications, BMP to monitor for stability.    (N30.01) Acute cystitis with hematuria  Comment: Resolved.  Completed course of ampicillin.  Plan: Monitor for signs or symptoms of infection.    (I35.0) Aortic valve stenosis, severe  Comment: New diagnosis.  Echo 10/1 sore throat severe aortic stenosis with mean aortic gradient of 43, aortic valve area of 0.7 cm-okay2.  Not felt to have contributed to syncopal episode.  Plan: Follow-up with cardiology.    (R33.9) Urinary retention chronic Tamez catheter,  (Z96.0) Chronic indwelling Tamez catheter  Comment: Chronic, Follows with Dr. Vega.  Discussed catheter cares with patient and facility staff.   Plan: Okay to change every 30 days to avoid trauma, if if urine output stops, staff to flush catheter -if unable to flush, or  continues to have no urine output return may need to consider evaluation for obstructive clot.    (M10.9) Gout, unspecified cause, unspecified chronicity, unspecified site  Comment: No recent flareups  Plan: Continue allopurinol 100 mg daily.    (K59.01) Slow transit constipation  Comment: Did have BM yesterday, is passing flatus.  Plan: Continue to monitor.    (M54.6) Acute midline thoracic back pain  Comment: T12 malignant lesion noted on recent CT scan  Plan: Continue Tylenol 1000 mg 3 times daily, oxycodone 5 mg q 4 4-hour as needed.    (R53.0) Neoplastic malignant related fatigue  Comment: Likely multifactorial due to weight loss, poor nutrition, malignancy, and significant anemia.  Was evaluated by palliative care, does feel that methylphenidate has helped  Plan: Continue methylphenidate 10 mg twice daily, continue to monitor    (R53.81) Physical deconditioning  Comment: Significant decline noted in the setting of recent hospitalization, syncope, anemia, malnutrition.  Goal is to discharge home.  Plan: PT and OT to evaluate and treat.    transcribed by : Sarah Rueda  Orders:  1. 2 units PRBC - please call to schedule @ [facility] - orders placed in Epic for blood &  Lasix 20 mg IV between units. Dx: anemia, gross hematuria.  2. Not stat - has oncology on 10/19/19 @ 1430  3.Change catheter q 28 days. Last changed  9/24/19 - dx: obstructive urinary retention  4. Melatonin 3 mg PO q hs  PRN - dx: Insomnia  5. Hold Losartan, Amlodipine for SBP <110    Total time spent with patient visit at the skilled nursing facility was 45 min including patient visit and review of past records. Greater than 50% of total time spent with counseling and coordinating care due to coordinating care with nurse on admission orders, coordinating care with follow up labs and appointments and counseling patient/resident for 30 minutes on: the reason for their hospitalization and what the treatment is, the plan of SNF stay  and projected length of stay, options of code status and their current code status order, current medications (treatments) reconciled from the hospital, recent past lab and imaging results and subsequent treatment plan, current pain control plan and controlled substances ordered and taper plan of care and Discussion of risks and benefits of blood transfusion, review of current medications, discussion of catheter current catheter cares, coordination of catheter management with staff and coordination of care regarding blood transfusion..    Electronically signed by:  ALEXX Morris CNP     Disclaimer:  This note consists of symbols derived from keyboarding, dictation, and/or voice recognition software.  As a result, there may be errors in the script that have gone undetected.  Please consider this when interpreting information found in the chart.                      Sincerely,        ALEXX Morris CNP

## 2019-10-07 NOTE — PROGRESS NOTES
"[facility]  PRIMARY CARE PROVIDER AND CLINIC:  Eveline Shanks MD, [address] [facility] [id number]  Chief Complaint   Patient presents with     Establish Care     [facility] Medical Record Number:  [id number]  Place of Service where encounter took place:  [facility] [[id number]]    Andres Sow  is a 85 year old  (11/21/1933), admitted to the above facility from  [facility]. Hospital stay 9/30/19 through 10/5/19..  Admitted to this facility for  rehab, medical management and nursing care.    HPI:    HPI information obtained from: facility chart records, facility staff, patient report and [facility] chart review.   Brief Summary of Hospital Course: Andres Sow is an 85-year-old gentleman with past medical history of HTN, HLD, CKD stage III, chronic indwelling Tamez catheter, who was admitted after syncopal episode at home.  He had been having hematuria for 3 to 4 months, with reports of worsening fatigue over the past few weeks.  Abdominal CT on 9/26 showed evidence of possible metastatic cancer.  On that same date he was also diagnosed with a UTI and treated with a course of ampicillin.  Hemoglobin in ED was 6.8.  Received 2 units of packed cells, hemoglobin improved to 8.1,, dropped again to 7.1 is transfused an additional 1 unit PRBC.  Underwent prostate biopsy on 10/1, pathology revealed high-grade carcinoma, favoring large cell neuroendocrine carcinoma.  Urology Dr. Vega felt prostate origin unlikely.  Echo on 10/1 showed severe aortic stenosis.  No previous history of heart failure.  Reports he had been diagnosed with a \"murmur\" during physical earlier this year.  Urine culture while inpatient had no growth.  Completed ampicillin on 10/4/2019.  Blood pressure stable.  Was seen by palliative care, started on methylphenidate for fatigue.  Discussed CODE STATUS, opted to remain full code, however was considering changing to " DNR.  When medically stable was discharged to TCU for further rehab and medical management.  Updates on Status Since Skilled nursing Admission:   Continues to have significant hematuria and catheter.  States did lighten briefly over the weekend, but urine has remained consistently dark red for the past 36 hours.  Hemoglobin has dropped in in TCU 9.1-8.5->7.6.  Denies any lightheaded is a dizziness or presyncopal symptoms.  Denies any pain, with some mild discomfort in back which he attributes to the bed.  Reports he's appetite is at baseline, but does admit to losing about 12 pounds in the past few months.  Did not sleep well last night, states she could not get comfortable.  Denies any chest pain or shortness of breath.  Has been afebrile.  Denies any discomfort with his catheter.  Is concerned he will not have the energy to work with therapy today.    CODE STATUS/ADVANCE DIRECTIVES DISCUSSION:   CPR/Full code   Patient's living condition: lives alone  ALLERGIES: Hydrochlorothiazide; Lisinopril; and Metoprolol  PAST MEDICAL HISTORY:  has a past medical history of Hypertension and Unspecified vertiginous syndromes and labyrinthine disorders.  PAST SURGICAL HISTORY:   has a past surgical history that includes REMOVAL OF TONSILS,<11 Y/O and colonoscopy (8/30/2007).  FAMILY HISTORY: family history includes Breast Cancer in his sister; Cerebrovascular Disease in his sister; Hypertension in his father.  SOCIAL HISTORY:   reports that he quit smoking about 41 years ago. His smoking use included cigarettes, pipe, and cigars. He quit after 25.00 years of use. He has never used smokeless tobacco. He reports current alcohol use of about 60.0 standard drinks of alcohol per week. He reports that he does not use drugs.    Post Discharge Medication Reconciliation Status: discharge medications reconciled and changed, per note/orders (see AVS)    Current Outpatient Medications   Medication Sig Dispense Refill     acetaminophen  (TYLENOL) 500 MG tablet Take 1,000 mg by mouth 3 times daily       allopurinol (ZYLOPRIM) 100 MG tablet Take 1 tablet (100 mg) by mouth daily 90 tablet 3     amLODIPine (NORVASC) 10 MG tablet Take 1 tablet (10 mg) by mouth daily 90 tablet 3     lidocaine (XYLOCAINE) 2 % external gel as needed for moderate pain Apply to Tip of penis topically as needed for pain Up to four times daily       LORazepam (ATIVAN) 0.5 MG tablet Take 1 tablet (0.5 mg) by mouth 2 times daily as needed for anxiety 20 tablet 0     losartan (COZAAR) 100 MG tablet Take 1 tablet (100 mg) by mouth daily 90 tablet 3     melatonin 3 MG tablet Take 1 mg by mouth nightly as needed for sleep       methylphenidate (RITALIN) 10 MG tablet Take 1 tablet (10 mg) by mouth 2 times daily 60 tablet 0     Multiple Vitamins-Minerals (PRESERVISION AREDS 2) CAPS Take 1 capsule by mouth 2 times daily        order for DME Equipment being ordered: Wheelchair    Dx:  Severe aortic stenosis, neoplastic fatigue 1 each 0     oxyCODONE (ROXICODONE) 5 MG tablet Take 1 tablet (5 mg) by mouth every 4 hours as needed 20 tablet 0     tamsulosin (FLOMAX) 0.4 MG capsule Take 1 capsule (0.4 mg) by mouth daily 90 capsule 3     vitamin D3 (CHOLECALCIFEROL) 2000 units (50 mcg) tablet Take 1 tablet by mouth every evening         ROS:  10 point ROS of systems including Constitutional, Eyes, Respiratory, Cardiovascular, Gastroenterology, Genitourinary, Integumentary, Musculoskeletal, Psychiatric were all negative except for pertinent positives noted in my HPI.    Vitals:  /48   Pulse 51   Temp 97.6  F (36.4  C)   Resp 18   Wt 65.8 kg (145 lb)   SpO2 95%   BMI 21.41 kg/m    Exam:  GENERAL APPEARANCE:  Alert, in no distress, oriented, thin, cooperative, pale  RESP:  respiratory effort and palpation of chest normal, lungs clear to auscultation , no respiratory distress, on RA  CV:  Palpation and auscultation of heart done , regular rate and rhythm, no murmur, rub, or gallop, no  edema, +2 pedal pulses  ABDOMEN:  no guarding or rebound, bowel sounds normal, soft, non-tender  :    Tamez catheter in place, moderate amount of dark red urine with small clots in bag  M/S:   Generalized weakness, no gross joint deformities  SKIN:  Pale, no open sores or lesions noted  NEURO:   Cranial nerves 2-12 are normal tested and grossly at patient's baseline  PSYCH:  oriented X 3, normal insight, judgement and memory, affect and mood normal    Lab/Diagnostic data:  Recent labs in [facility] reviewed by me today.  and   Most Recent 3 CBC's:  Recent Labs   Lab Test 10/07/19  0734 10/06/19  1030 10/04/19  0449 10/03/19  0531 10/02/19  0515   WBC  --   --  9.5 9.1 8.9   HGB 7.6* 8.5* 9.0* 7.9* 8.1*   MCV  --   --  90 90 89   PLT  --   --  198 187 189     Most Recent 3 BMP's:  Recent Labs   Lab Test 10/04/19  0449 10/02/19  0515 10/01/19  0454    135 133   POTASSIUM 4.8 5.0 4.9   CHLORIDE 103 104 103   CO2 24 24 22   BUN 40* 33* 31*   CR 1.36* 1.25 1.20   ANIONGAP 8 7 8   NUVIA 8.4* 8.7 8.5   GLC 92 79 85       ASSESSMENT/PLAN:  (R31.0) Gross hematuria  (primary encounter diagnosis)  (D62) Anemia due to blood loss, acute  (C80.1) Malignant neoplasm (H)  (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  CT on 926 showed presume prostate malignancy, T12 osteosis lesion and lung nodules.  Continues to have significant hematuria, hemoglobin dropped approximately 1 g in last 24 hours.  Vital signs stable, but does have significant fatigue.  Recent urine culture negative. Discussed benefits and risks of blood transfusion with patient, consent form signed.  Coordinated with nursing staff who have scheduled blood transfusion.  Plan: Given active bleeding and drop in hemoglobin, will order 2 units of packed red cells with 20 mg of IV Lasix in between -transfusion scheduled for 10/9.  Monitor for symptoms of anemia, and send to ER if becomes unstable.  Plan to see oncology  Dr. Hebetr on 10/9 to establish care.  Recheck hemoglobin on 10/10 after transfusion.  Follow-up with neurology in 2 weeks.    (R55) Syncope, unspecified syncope type  Comment: Likely due to to anemia.  Plan: Monitor vital signs, anemia management as above.    (E43) Severe protein-calorie malnutrition (H)  Comment: Recent unintentional weight loss, BMI 21.4.  Plan: Dietitian to to follow, recommend dietary supplements    (I10) Hypertension goal BP (blood pressure) < 150/90  (E78.5) Hyperlipidemia LDL goal <100  Comment: SBP 110s to 130s in TCU.  Stable during recent hospitalization.  Plan: Continue losartan 100 mg daily, amlodipine 10 mg daily.  Will add hold parameters.  Continue to monitor.    (F41.9) Anxiety  Comment: Seen by palliative care, reports minimal use of Ativan.  Plan: Continue lorazepam 0.5 mg twice daily as needed x14 days, then will reevaluate for further need    (N18.3) CKD (chronic kidney disease) stage 3, GFR 30-59 ml/min (H)  Comment: Baseline GFR 50-55, creatinine ~2, stable during recent hospitalization  Plan: Avoid nephrotoxic medications, BMP to monitor for stability.    (N30.01) Acute cystitis with hematuria  Comment: Resolved.  Completed course of ampicillin.  Plan: Monitor for signs or symptoms of infection.    (I35.0) Aortic valve stenosis, severe  Comment: New diagnosis.  Echo 10/1 sore throat severe aortic stenosis with mean aortic gradient of 43, aortic valve area of 0.7 cm-okay2.  Not felt to have contributed to syncopal episode.  Plan: Follow-up with cardiology.    (R33.9) Urinary retention chronic Tamez catheter,  (Z96.0) Chronic indwelling Tamez catheter  Comment: Chronic, Follows with Dr. Vega.  Discussed catheter cares with patient and facility staff.   Plan: Okay to change every 30 days to avoid trauma, if if urine output stops, staff to flush catheter -if unable to flush, or continues to have no urine output return may need to consider evaluation for obstructive  clot.    (M10.9) Gout, unspecified cause, unspecified chronicity, unspecified site  Comment: No recent flareups  Plan: Continue allopurinol 100 mg daily.    (K59.01) Slow transit constipation  Comment: Did have BM yesterday, is passing flatus.  Plan: Continue to monitor.    (M54.6) Acute midline thoracic back pain  Comment: T12 malignant lesion noted on recent CT scan  Plan: Continue Tylenol 1000 mg 3 times daily, oxycodone 5 mg q 4 4-hour as needed.    (R53.0) Neoplastic malignant related fatigue  Comment: Likely multifactorial due to weight loss, poor nutrition, malignancy, and significant anemia.  Was evaluated by palliative care, does feel that methylphenidate has helped  Plan: Continue methylphenidate 10 mg twice daily, continue to monitor    (R53.81) Physical deconditioning  Comment: Significant decline noted in the setting of recent hospitalization, syncope, anemia, malnutrition.  Goal is to discharge home.  Plan: PT and OT to evaluate and treat.    transcribed by : Sarah Rueda  Orders:  1. 2 units PRBC - please call to schedule @ [facility] - orders placed in [employer] for blood &  Lasix 20 mg IV between units. Dx: anemia, gross hematuria.  2. Not stat - has oncology on 10/19/19 @ 1430  3.Change catheter q 28 days. Last changed  9/24/19 - dx: obstructive urinary retention  4. Melatonin 3 mg PO q hs  PRN - dx: Insomnia  5. Hold Losartan, Amlodipine for SBP <110    Total time spent with patient visit at the skilled nursing facility was 45 min including patient visit and review of past records. Greater than 50% of total time spent with counseling and coordinating care due to coordinating care with nurse on admission orders, coordinating care with follow up labs and appointments and counseling patient/resident for 30 minutes on: the reason for their hospitalization and what the treatment is, the plan of SNF stay and projected length of stay, options of code status and their current code status order,  current medications (treatments) reconciled from the hospital, recent past lab and imaging results and subsequent treatment plan, current pain control plan and controlled substances ordered and taper plan of care and Discussion of risks and benefits of blood transfusion, review of current medications, discussion of catheter current catheter cares, coordination of catheter management with staff and coordination of care regarding blood transfusion..    Electronically signed by:  ALEXX Morris CNP     Disclaimer:  This note consists of symbols derived from keyboarding, dictation, and/or voice recognition software.  As a result, there may be errors in the script that have gone undetected.  Please consider this when interpreting information found in the chart.

## 2019-10-08 NOTE — PROGRESS NOTES
[facility]  INITIAL VISIT NOTE  October 8, 2019    PRIMARY CARE PROVIDER AND CLINIC:  Eveline Shanks [facility] [address] [facility] [address]    Chief Complaint   Patient presents with     Hospital F/U       HPI:    Andres Sow is a 85 year old  (11/21/1933) male who [facility] for an initial visit. Medical history is notable for HTN, BPH and gross hematuria.  He has followed closely with urology as of late for the hematuria.  He has a Tamez in place.  Attempts at cystoscopy have been limited due to hematuria.  At his most recent cystoscopy, exam was notable for some enlarged lymph nodes.  A CT abdomen pelvis was ordered and was notable for a mass near the prostate with suspected invasion into the left seminal vesicle and left anterolateral rectal wall as well as protrusion up into the bladder and bladder wall.  Also notable was a right sided pubic ramus fracture that appeared subacute and multiple enlarged lymph nodes.  Plan was for outpatient evaluation of the mass; however, he had a syncopal episode and was subsequently hospitalized at [facility] from 9/30/2019 to 10/4/2019.  Labs notable for a robert Hgb of 6.8 and he received a total of 3 units PRBCs.  Also noted to have severe aortic stenosis.  He underwent a prostate biopsy which revealed a high-grade likely neuroendocrine carcinoma. CT chest notable for innumerable bilateral pulmonary nodules concerning for metastasis as well as a T12 vertebral body lesion also concerning for metastasis. He was admitted to this facility for medical management and rehab.     Today, Mr. Sow is seen in his room.  He continues to have gross hematuria.  Labs at the TCU yesterday with Hgb 7.6, which is a 1.5 g drop in 3 days.  He has a consultation with oncology tomorrow, and is scheduled to receive 2 units PRBCs prior to that appointment.  He said he feels tired and weak.  We talked about the  methylphenidate started by palliative medicine.  He does not notice much of a difference with the medication.  He thinks receiving the blood is what helps him feel the best. He has no specific questions or concerns today.  He is awaiting his appointment with oncology tomorrow, and hopes to be able to learn what his options are so he can decide next steps.  No concerns per discussion with nursing today.  He is working with therapies.    CODE STATUS:   CPR/Full code     ALLERGIES:     Allergies   Allergen Reactions     Hydrochlorothiazide Other (See Comments)     Low Potassium     Lisinopril Swelling     Neck swelling/airway     Metoprolol Other (See Comments)     He was seen in the emergency room for lightheadedness after I increased his metoprolol. Patient stopped taking it-noted 6/18/2019.        PAST MEDICAL HISTORY:   Past Medical History:   Diagnosis Date     Hypertension      Unspecified vertiginous syndromes and labyrinthine disorders     seen in ED after starting metoprolol; continued on it without adverse reaction       PAST SURGICAL HISTORY:   Past Surgical History:   Procedure Laterality Date     COLONOSCOPY  8/30/2007    Repeat colonoscopy in 3 years for surveillance     HC REMOVAL OF TONSILS,<11 Y/O         FAMILY HISTORY:   Family History   Problem Relation Age of Onset     Hypertension Father      Breast Cancer Sister      Cerebrovascular Disease Sister        SOCIAL HISTORY:   Lives alone     MEDICATIONS:  Current Outpatient Medications   Medication Sig Dispense Refill     acetaminophen (TYLENOL) 500 MG tablet Take 1,000 mg by mouth 3 times daily       allopurinol (ZYLOPRIM) 100 MG tablet Take 1 tablet (100 mg) by mouth daily 90 tablet 3     amLODIPine (NORVASC) 10 MG tablet Take 1 tablet (10 mg) by mouth daily 90 tablet 3     lidocaine (XYLOCAINE) 2 % external gel as needed for moderate pain Apply to Tip of penis topically as needed for pain Up to four times daily       LORazepam (ATIVAN) 0.5 MG  "tablet Take 1 tablet (0.5 mg) by mouth 2 times daily as needed for anxiety 20 tablet 0     losartan (COZAAR) 100 MG tablet Take 1 tablet (100 mg) by mouth daily 90 tablet 3     melatonin 3 MG tablet Take 1 mg by mouth nightly as needed for sleep       methylphenidate (RITALIN) 10 MG tablet Take 1 tablet (10 mg) by mouth 2 times daily 60 tablet 0     Multiple Vitamins-Minerals (PRESERVISION AREDS 2) CAPS Take 1 capsule by mouth 2 times daily        oxyCODONE (ROXICODONE) 5 MG tablet Take 1 tablet (5 mg) by mouth every 4 hours as needed 20 tablet 0     tamsulosin (FLOMAX) 0.4 MG capsule Take 1 capsule (0.4 mg) by mouth daily 90 capsule 3     vitamin D3 (CHOLECALCIFEROL) 2000 units (50 mcg) tablet Take 1 tablet by mouth every evening       order for DME Equipment being ordered: Wheelchair    Dx:  Severe aortic stenosis, neoplastic fatigue 1 each 0       ROS:  10 point ROS neg other than the symptoms noted above in the HPI.    PHYSICAL EXAM:  /48   Pulse 51   Temp 97.6  F (36.4  C)   Resp 16   Ht 1.753 m (5' 9\")   Wt 65.8 kg (145 lb)   SpO2 95%   BMI 21.41 kg/m     Gen: sitting in recliner, alert, cooperative and in no acute distress  HEENT: normocephalic; oropharynx kellie  Resp: breathing non labored,   no tachypnea   GI: abdomen soft, not-tender  : Tamez in place; urine + for gross hematuria, no clots  MSK: decreased muscle tone, no LE edema  Neuro: CX II-XII grossly in tact; ROM in all four extremities grossly in tact  Psych: alert and oriented x3; normal affect    LABORATORY/IMAGING DATA:  Reviewed as per Epic    ASSESSMENT/PLAN:    Metastatic Malignancy  Found to have a pelvic mass. Prostate biopsy consistent with a high grade cancer, likely neuroendocrine. Imaging with multiple pulmonary nodules and a T12 vertebral body lesion - all suspicious for metastasis.   -- sees oncology tomorrow for consultation    Gross Hematuria  Urinary Retention  Tamez in Place   Gross hematuria today in Tamez bag. " Continues to have worsening anemia (see below)  -- routine Tamez cares   -- tamsulosin 0.4 mg daily   -- follow up with urology as scheduled on 10/15    Acute Blood Loss Anemia  Secondary to above. No evidence of ongoing bleeding. Received 3 units PRBCs. Hgb 9.1 --> 7.6 on Monday  -- is scheduled for 2 units PRBCs tomorrow   -- sees heme/onc tomorrow as well    FTT   Unintentional 10 lb weight loss since this summer. New on methylphenidate during hospitalization; however, he's not sure it's helping his energy.   -- methylphenidate 10 mg BID    HTN  SBPs labile in 100s-140s, most 110s   -- continues on amlodipine 10 mg daily and losartan 100 mg daily   -- follow BPs and adjust medications as needed    Gout  By history. No signs of flare.   -- continues on allopurinol 100 mg daily    Physical Deconditioning  In setting of hospitalization and underlying medical conditions  -- ongoing PT/OT      Electronically signed by:  Sruthi Bernal MD

## 2019-10-09 NOTE — PROGRESS NOTES
"Oncology Rooming Note    October 9, 2019 11:36 AM   Andres Sow is a 85 year old male who presents for:    Chief Complaint   Patient presents with     Oncology Clinic Visit     New Patient - Suspected malignant neoplasm     Initial Vitals: BP (!) 120/36 (BP Location: Left arm, Patient Position: Sitting, Cuff Size: Adult Regular)   Pulse 54   Temp 96.1  F (35.6  C) (Tympanic)   Resp 18   Ht 1.753 m (5' 9.02\")   Wt 65.8 kg (145 lb)   SpO2 96%   BMI 21.40 kg/m   Estimated body mass index is 21.4 kg/m  as calculated from the following:    Height as of this encounter: 1.753 m (5' 9.02\").    Weight as of this encounter: 65.8 kg (145 lb). Body surface area is 1.79 meters squared.  Data Unavailable Comment: Data Unavailable   No LMP for male patient.  Allergies reviewed: Yes  Medications reviewed: Yes    Medications: Medication refills not needed today.  Pharmacy name entered into EPIC:      [id number] - [address]    Clinical concerns: New Patient - Suspected malignant neoplasm.       Jo-Ann Alexandre CMA              "

## 2019-10-09 NOTE — PROGRESS NOTES
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    CHIEF COMPLAINT:   Chief Complaint   Patient presents with     Oncology Clinic Visit     New Patient - Suspected malignant neoplasm       HISTORY OF PRESENT ILLNESS:   Andres Sow is a 85-year-old male patient who presented with 4 months history of urine retention and gross hematuria..  This was resulted in several episodes of urine retention promoted insertion of a Tamez's catheter.  Also has been complaining of lightheadedness and generalized weakness.  Was recently hospitalized because of recent fatigue and weakness.  Subsequently he went on to have a CT scan of the abdomen and pelvis on September 26, 2019 showing prostate malignancy with invasion of adjacent structures and pelvic adenopathy.  There is also osseous lesion involving T12.  There is multiple nodules of lung bases.  CT of the chest with contrast to rule out pulmonary embolism was done on September 30, 2019 showing abdominal wall bilateral pulmonary nodules.  There is 2.3 cm area of increased attenuation in the lateral aspect of T12 vertebral body was seen as well.  PSA was 2.8.  Subsequently a needle biopsy of the prostate using ultrasound guidance was done on October 1, 2019.  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  Patient is here today to discuss these findings.    REVIEW OF SYSTEMS:   Constitutional: Negative for fever, chills, and night sweats.  Weight loss of about 20 pounds over the last 4 months.  Skin: negative.  Eyes: negative.  Ears/Nose/Throat: negative.  Respiratory: No shortness of breath, dyspnea on exertion, cough, or hemoptysis.  Cardiovascular: negative.  Gastrointestinal: negative.  Genitourinary: Urine retention and hematuria  Musculoskeletal: Back pain.  Neurologic: Generalized weakness and poor balance.  Psychiatric: negative.  Hematologic/Lymphatic/Immunologic:  negative.  Endocrine: negative.    PAST MEDICAL HISTORY:   Past Medical History:   Diagnosis Date     Hypertension      Unspecified vertiginous syndromes and labyrinthine disorders     seen in ED after starting metoprolol; continued on it without adverse reaction       PAST SURGICAL HISTORY:   Past Surgical History:   Procedure Laterality Date     COLONOSCOPY  8/30/2007    Repeat colonoscopy in 3 years for surveillance     HC REMOVAL OF TONSILS,<13 Y/O         ALLERGIES:   Allergies as of 10/09/2019 - Reviewed 10/09/2019   Allergen Reaction Noted     Hydrochlorothiazide Other (See Comments) 11/13/2014     Lisinopril Swelling 07/12/2006     Metoprolol Other (See Comments) 10/20/2009       MEDICATIONS:   Current Outpatient Medications   Medication Sig Dispense Refill     acetaminophen (TYLENOL) 500 MG tablet Take 1,000 mg by mouth 3 times daily       allopurinol (ZYLOPRIM) 100 MG tablet Take 1 tablet (100 mg) by mouth daily 90 tablet 3     amLODIPine (NORVASC) 10 MG tablet Take 1 tablet (10 mg) by mouth daily 90 tablet 3     lidocaine (XYLOCAINE) 2 % external gel as needed for moderate pain Apply to Tip of penis topically as needed for pain Up to four times daily       losartan (COZAAR) 100 MG tablet Take 1 tablet (100 mg) by mouth daily 90 tablet 3     methylphenidate (RITALIN) 10 MG tablet Take 1 tablet (10 mg) by mouth 2 times daily 60 tablet 0     Multiple Vitamins-Minerals (PRESERVISION AREDS 2) CAPS Take 1 capsule by mouth 2 times daily        tamsulosin (FLOMAX) 0.4 MG capsule Take 1 capsule (0.4 mg) by mouth daily 90 capsule 3     vitamin D3 (CHOLECALCIFEROL) 2000 units (50 mcg) tablet Take 1 tablet by mouth every evening       LORazepam (ATIVAN) 0.5 MG tablet Take 1 tablet (0.5 mg) by mouth 2 times daily as needed for anxiety (Patient not taking: Reported on 10/9/2019) 20 tablet 0     order for DME Equipment being ordered: Wheelchair    Dx:  Severe aortic stenosis, neoplastic fatigue 1 each 0     oxyCODONE  (ROXICODONE) 5 MG tablet Take 1 tablet (5 mg) by mouth every 4 hours as needed (Patient not taking: Reported on 10/9/2019) 20 tablet 0        FAMILY HISTORY:   Family History   Problem Relation Age of Onset     Hypertension Father      Breast Cancer Sister      Cerebrovascular Disease Sister         SOCIAL HISTORY:   Social History     Socioeconomic History     Marital status:      Spouse name: None     Number of children: None     Years of education: None     Highest education level: None   Occupational History     None   Social Needs     Financial resource strain: None     Food insecurity:     Worry: None     Inability: None     Transportation needs:     Medical: None     Non-medical: None   Tobacco Use     Smoking status: Former Smoker     Years: 25.00     Types: Cigarettes, Pipe, Cigars     Last attempt to quit: 1978     Years since quittin.7     Smokeless tobacco: Never Used   Substance and Sexual Activity     Alcohol use: Yes     Alcohol/week: 60.0 standard drinks     Comment: drinks 2 drinks daily     Drug use: No     Sexual activity: Not Currently   Lifestyle     Physical activity:     Days per week: None     Minutes per session: None     Stress: None   Relationships     Social connections:     Talks on phone: None     Gets together: None     Attends [employer] service: None     Active member of club or organization: None     Attends meetings of clubs or organizations: None     Relationship status: None     Intimate partner violence:     Fear of current or ex partner: None     Emotionally abused: None     Physically abused: None     Forced sexual activity: None   Other Topics Concern     Parent/sibling w/ CABG, MI or angioplasty before 65F 55M? No   Social History Narrative    2019:   11 years ago; has 2 daughters who live nearby.  He quit smoking in  with a 25 pack-year history.  He rents a 2-story [employer] in[place] in [place] that [place].  He is a  "retired .  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  He identifies as Mandaeism, but drifted away from attending Yarsanism after his wife's death.  His daughters do housecleaning and grocery shopping for him.  He used to enjoy golf, but has no energy for it now.      Clifford Walsh CNP (Ann)    [facility] cell:  [phone]        PHYSICAL EXAMINATION:   BP (!) 120/36 (BP Location: Left arm, Patient Position: Sitting, Cuff Size: Adult Regular)   Pulse 54   Temp 96.1  F (35.6  C) (Tympanic)   Resp 18   Ht 1.753 m (5' 9.02\")   Wt 65.8 kg (145 lb)   SpO2 96%   BMI 21.40 kg/m    Wt Readings from Last 10 Encounters:   10/09/19 65.8 kg (145 lb)   10/07/19 65.8 kg (145 lb)   10/07/19 65.8 kg (145 lb)   10/04/19 66.5 kg (146 lb 9.7 oz)   09/24/19 67.9 kg (149 lb 12.8 oz)   07/31/19 68.1 kg (150 lb 3.2 oz)   07/17/19 71.7 kg (158 lb)   06/18/19 71.7 kg (158 lb)   06/05/19 71.7 kg (158 lb)   06/03/19 71.7 kg (158 lb)      ECOG performance status: 1  GENERAL APPEARANCE: Healthy, alert and in no acute distress.  HEENT: Sclerae anicteric. PERRLA. Oropharynx without ulcers, lesions, or thrush.  NECK: Supple. No asymmetry or masses.  LYMPHATICS: No palpable cervical, supraclavicular, axillary, or inguinal lymphadenopathy.  RESP: Lungs clear to auscultation bilaterally without rales, rhonchi or wheezes.  CARDIOVASCULAR: Regular rate and rhythm. Normal S1, S2; no S3 or S4. No murmur, gallop, or rub.  ABDOMEN: Soft, nontender. Bowel sounds normal. No palpable organomegaly or masses.  MUSCULOSKELETAL: Extremities without gross deformities noted. No edema of bilateral lower extremities.  SKIN: No suspicious lesions or rashes.  NEURO: Alert and oriented x 3. Cranial nerves II-XII grossly intact.  PSYCHIATRIC: Mentation and affect appear normal.    LABORATORY RESULTS:  Infusion Therapy Visit on 10/09/2019   Component Date Value Ref Range Status "     Units Ordered 10/07/2019 2   Final     ABO 10/07/2019 A   Final     RH(D) 10/07/2019 Pos   Final     Antibody Screen 10/07/2019 Neg   Final     Test Valid Only At 10/07/2019 [facility]      Final     Specimen Expires 10/07/2019 10/10/2019   Final     Crossmatch 10/07/2019 Red Blood Cells   Final     Unit Number 10/07/2019 [id number]   Final     Blood Component Type 10/07/2019 Red Blood Cells Leukocyte Reduced   Final     Division Number 10/07/2019 00   Final     Status of Unit 10/07/2019 Released to care unit 10/09/2019 1055   Preliminary     Blood Product Code 10/07/2019 [id number]   Final     Unit Status 10/07/2019 ISS   Preliminary     Unit Number 10/07/2019 [id number]   Final     Blood Component Type 10/07/2019 Red Blood Cells Leukocyte Reduced   Final     Division Number 10/07/2019 00   Final     Status of Unit 10/07/2019 Released to care unit 10/09/2019 1312   Preliminary     Blood Product Code 10/07/2019 [id number]   Final     Unit Status 10/07/2019 ISS   Preliminary       IMAGING RESULTS:  Recent Results (from the past 744 hour(s))   CT Abdomen Pelvis w/o & w Contrast [[id number]]    Narrative    CT ABDOMEN PELVIS W/O & W CONTRAST 9/26/2019 2:20 PM    HISTORY: Gross hematuria.    CONTRAST:  100mL Isovue-370.    TECHNIQUE: Abdomen and pelvis CT are performed without and with IV  contrast.    The non-IV enhanced the images are utilized mainly to assess for  urolithiasis or other pathological calcifications. On the delayed IV  enhanced portion of the study the kidneys, ureters, and bladder are  assessed.  The liver, spleen, pancreas, adrenal glands,  retroperitoneum, and abdominal aorta are also assessed. Finally,  pelvic anatomy is assessed on the pelvis portion of the CT.    Radiation dose for this scan is reduced using automated exposure  control, adjustment of mA and/or kV according to patient size, or  iterative reconstruction technique.    COMPARISON: None.    FINDINGS:    Abdomen: A 1 cm  hyperdense cyst is present in the right kidney. Two  simple cysts are present in the left kidney. There is no  ureterolithiasis or hydronephrosis. Numerous noncalcified nodules are  seen at the lung bases, the largest which measures 12 mm on the right  posteromedially in the lower lobe on axial image 1. There is a 2.6 cm  osseous lesion involving the T12 vertebral body. There is a cystic  lesion in the spleen that measures 1.5 cm. It is nonspecific.  Statistically solitary splenic lesions are benign.    Pelvis: The prostate gland is enlarged. There is contiguous soft  tissue abnormality with the prostate gland extending left  posterolateral. There is suspected left seminal vesicle invasion and  left anterolateral rectal wall invasion is suspected as well. The  prostate process protrudes up into the bladder and bladder wall  invasion is not excluded. There is perivesicle stranding and outer  bladder wall ill-definition. This may relate to a component of  cystitis. There is a fracture involving the right initial ramus on  axial image 85 of series 5. This is at least subacute if not chronic.  A mildly enlarged left pelvic sidewall lymph node on axial image 65  measures 1 cm. A borderline enlarged right pelvic sidewall lymph node  is seen on axial image 65 and measures 0.9 cm.  An enlarged lymph node  in the lower mesenteric fat of the left paramidline pelvis on axial  image 61 measures 0.9 cm in short axis dimension. Sigmoid  diverticulosis is present.      Impression    IMPRESSION:  1. Prostate malignancy is suspected with invasion of adjacent  structures and pelvic adenopathy.  2. There is an osseous lesion involving T12 which is nonspecific but  would have to be of concern for osseous metastatic disease, given  other findings.  3. Nodules at the lung bases are of concern for pulmonary metastatic  disease. Dedicated chest CT could further assess, if clinically  warranted.    BLANCA BAE MD   CT Chest Pulmonary  Embolism w Contrast    Narrative    CT CHEST WITH CONTRAST  9/30/2019 2:05 PM    HISTORY: Evaluate pulmonary nodules seen on a recent CT of the abdomen  and pelvis.     COMPARISON: A CT of the abdomen and pelvis on 9/26/2019 which included  the lower chest.    TECHNIQUE: Routine transverse CT imaging of the chest was performed  following the uneventful intravenous administration of 68 mL Isovue  370 contrast. Radiation dose for this scan was reduced using automated  exposure control, adjustment of the mA and/or kV according to patient  size, or iterative reconstruction technique.    FINDINGS: The heart size is normal.No enlarged lymph node or other  abnormal mediastinal mass is seen. There is calcification within the  thoracic aorta. The vascular structures are otherwise unremarkable.  There are innumerable nodules seen diffusely throughout both lungs.  The largest is a collection of nodules in the left midlung measuring  up to 2.4 cm demonstrated on series 4 image 68. On the right there is  a 1.7 cm long nodule in the right infrahilar region on series 4 image  74. There is an additional 2.7 cm long mass just to the right of the  heart border on series 4 image 116. There are numerous smaller nodular  densities seen diffusely throughout both lungs. The lungs are  otherwise clear with no consolidation or infiltrate seen. No  pneumothorax is demonstrated. No pleural effusion is identified. There  are degenerative changes in the spine. The coronal series 6 image 94  suggests a 2.3 cm area of increased density in the right aspect of the  T12 vertebral body. This is not calcified. No other osseous  abnormality is noted. No chest wall pathology is seen. The visualized  upper abdomen is unremarkable.      Impression    IMPRESSION:   1. There are innumerable bilateral pulmonary nodules as described  above. Metastatic disease needs to be considered.  2. There is a 2.3 cm area of increased attenuation within the right  aspect  of the T12 vertebral body. This is a nonspecific finding but  metastatic involvement needs to be considered.    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  I discussed with the imaging most recent CT scan.  I also reviewed with the patient the pathology report in details.  I talked about the natural history of large cell neuroendocrine tumor of the prostate.  We talked about staging.  Patient had stage IV disease.  We talked about palliative chemotherapy.  I would recommend chemotherapy with carboplatin and etoposide.  The patient will receive carboplatin with AUC of 5-day 1 and etoposide 80 mg/m  to be given daily days 3 every 21 days. I recommend carboplatin and etoposide regimen. The patient will be receiving carboplatin  AUC 5 intravenously on day 1 and etoposide 100 mg per metered square intravenously on day 1-3 of each chemotherapy cycle of 28 days. We will plan to give him 4 cycles of chemotherapy. We discussed the rationale behind using combination Carboplatin and etoposide in detail as well as major side effects including, but not limited to immediate/short term side effects of acute infusion reaction/anaphylaxis, life-threatening infection, flu-like syndrome (headache, weakness, fever, shakes, aches, pains, and sweating), nausea/vomiting, diarrhea/constipation, mucositis, edema, rare alopecia, rash, anemia, neutropenia, thrombocytopenia and bleeding, and weakness and fatigue as well as risk of long-term side effect of rare duration and dose dependent peripheral neuropathy and pulmonary toxicity. Patient instructed to call with signs or symptoms of infection including, but not limited to temperature greater than or  equal to 100.5 degrees Fahrenheit, chills, severe sore throat, ear or sinus pain, mouth sores, cough, painful urination, and/or non-healing wound. The patient was given written information about Carboplatin and etoposide, agrees to proceed with treatment, and denies any further questions at this time. We will be planning to arrange for CT scan to assess response to treatment after 2 cycles of treatment.     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  Recommend continue to monitor CBC weekly for blood transfusion if required.    (I10) Essential hypertension, benign  Blood pressures currently well controlled on Norvasc 10 mg orally daily.  Patient currently on losartan 100 mg orally daily.    (M10.9) Gout, unspecified cause, unspecified chronicity, unspecified site  Patient currently on allopurinol 100 mg orally daily.    The patient is ready to learn, no apparent learning barriers were identified, Diagnosis and treatment plans were explained to the patient. The patient expressed understanding of the content. The patient questions were answered to his satisfaction.    Virginia Hebert MD    Chart documentation with Dragon Voice recognition Software. Although reviewed after completion, some words and grammatical errors may remain.

## 2019-10-09 NOTE — PROGRESS NOTES
Infusion Nursing Note:  Andres Sow presents today for 2 unit PRBCs.    Patient seen by provider today: Yes: Dr. Hebert   present during visit today: Not Applicable.    Note: N/A.    Intravenous Access:  Peripheral IV placed.    Treatment Conditions:  Hgb 7.6; active bleed.    Post Infusion Assessment:  Patient tolerated infusion without incident.  Site patent and intact, free from redness, edema or discomfort.  No evidence of extravasations.  Access discontinued per protocol.     Discharge Plan:   Discharge instructions reviewed with: Patient.  Patient and/or family verbalized understanding of discharge instructions and all questions answered.  Copy of AVS reviewed with patient and/or family.  Patient will return as directed by MD for next appointment.  Patient discharged in stable condition accompanied by: self and neighbor.  Departure Mode: Wheelchair.    Ronda Horn RN

## 2019-10-09 NOTE — LETTER
"    10/9/2019         RE: Andres Sow  [address] [facility] [id number]        Dear Colleague,    Thank you for referring your patient, Andres Sow, to the [facility]. Please see a copy of my visit note below.    Oncology Rooming Note    October 9, 2019 11:36 AM   Andres Sow is a 85 year old male who presents for:    Chief Complaint   Patient presents with     Oncology Clinic Visit     New Patient - Suspected malignant neoplasm     Initial Vitals: BP (!) 120/36 (BP Location: Left arm, Patient Position: Sitting, Cuff Size: Adult Regular)   Pulse 54   Temp 96.1  F (35.6  C) (Tympanic)   Resp 18   Ht 1.753 m (5' 9.02\")   Wt 65.8 kg (145 lb)   SpO2 96%   BMI 21.40 kg/m    Estimated body mass index is 21.4 kg/m  as calculated from the following:    Height as of this encounter: 1.753 m (5' 9.02\").    Weight as of this encounter: 65.8 kg (145 lb). Body surface area is 1.79 meters squared.  Data Unavailable Comment: Data Unavailable   No LMP for male patient.  Allergies reviewed: Yes  Medications reviewed: Yes    Medications: Medication refills not needed today.  Pharmacy name entered into EPIC:      [id number] - [address]    Clinical concerns: New Patient - Suspected malignant neoplasm.       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    CHIEF COMPLAINT:   Chief Complaint   Patient presents with     Oncology Clinic Visit     New Patient - Suspected malignant neoplasm       HISTORY OF PRESENT ILLNESS:   Andres Sow is a 85-year-old male patient who presented with 4 months history of urine retention and gross hematuria..  This was resulted in several episodes of urine retention promoted insertion of a Tamez's catheter.  Also has been complaining of lightheadedness and generalized weakness.  Was recently hospitalized because of recent fatigue and weakness.  " Subsequently he went on to have a CT scan of the abdomen and pelvis on September 26, 2019 showing prostate malignancy with invasion of adjacent structures and pelvic adenopathy.  There is also osseous lesion involving T12.  There is multiple nodules of lung bases.  CT of the chest with contrast to rule out pulmonary embolism was done on September 30, 2019 showing abdominal wall bilateral pulmonary nodules.  There is 2.3 cm area of increased attenuation in the lateral aspect of T12 vertebral body was seen as well.  PSA was 2.8.  Subsequently a needle biopsy of the prostate using ultrasound guidance was done on October 1, 2019.  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  Patient is here today to discuss these findings.    REVIEW OF SYSTEMS:   Constitutional: Negative for fever, chills, and night sweats.  Weight loss of about 20 pounds over the last 4 months.  Skin: negative.  Eyes: negative.  Ears/Nose/Throat: negative.  Respiratory: No shortness of breath, dyspnea on exertion, cough, or hemoptysis.  Cardiovascular: negative.  Gastrointestinal: negative.  Genitourinary: Urine retention and hematuria  Musculoskeletal: Back pain.  Neurologic: Generalized weakness and poor balance.  Psychiatric: negative.  Hematologic/Lymphatic/Immunologic: negative.  Endocrine: negative.    PAST MEDICAL HISTORY:   Past Medical History:   Diagnosis Date     Hypertension      Unspecified vertiginous syndromes and labyrinthine disorders     seen in ED after starting metoprolol; continued on it without adverse reaction       PAST SURGICAL HISTORY:   Past Surgical History:   Procedure Laterality Date     COLONOSCOPY  8/30/2007    Repeat colonoscopy in 3 years for surveillance     HC REMOVAL OF TONSILS,<11 Y/O         ALLERGIES:   Allergies as of 10/09/2019 - Reviewed 10/09/2019   Allergen Reaction Noted     Hydrochlorothiazide Other (See Comments) 11/13/2014     Lisinopril  Swelling 07/12/2006     Metoprolol Other (See Comments) 10/20/2009       MEDICATIONS:   Current Outpatient Medications   Medication Sig Dispense Refill     acetaminophen (TYLENOL) 500 MG tablet Take 1,000 mg by mouth 3 times daily       allopurinol (ZYLOPRIM) 100 MG tablet Take 1 tablet (100 mg) by mouth daily 90 tablet 3     amLODIPine (NORVASC) 10 MG tablet Take 1 tablet (10 mg) by mouth daily 90 tablet 3     lidocaine (XYLOCAINE) 2 % external gel as needed for moderate pain Apply to Tip of penis topically as needed for pain Up to four times daily       losartan (COZAAR) 100 MG tablet Take 1 tablet (100 mg) by mouth daily 90 tablet 3     methylphenidate (RITALIN) 10 MG tablet Take 1 tablet (10 mg) by mouth 2 times daily 60 tablet 0     Multiple Vitamins-Minerals (PRESERVISION AREDS 2) CAPS Take 1 capsule by mouth 2 times daily        tamsulosin (FLOMAX) 0.4 MG capsule Take 1 capsule (0.4 mg) by mouth daily 90 capsule 3     vitamin D3 (CHOLECALCIFEROL) 2000 units (50 mcg) tablet Take 1 tablet by mouth every evening       LORazepam (ATIVAN) 0.5 MG tablet Take 1 tablet (0.5 mg) by mouth 2 times daily as needed for anxiety (Patient not taking: Reported on 10/9/2019) 20 tablet 0     order for DME Equipment being ordered: Wheelchair    Dx:  Severe aortic stenosis, neoplastic fatigue 1 each 0     oxyCODONE (ROXICODONE) 5 MG tablet Take 1 tablet (5 mg) by mouth every 4 hours as needed (Patient not taking: Reported on 10/9/2019) 20 tablet 0        FAMILY HISTORY:   Family History   Problem Relation Age of Onset     Hypertension Father      Breast Cancer Sister      Cerebrovascular Disease Sister         SOCIAL HISTORY:   Social History     Socioeconomic History     Marital status:      Spouse name: None     Number of children: None     Years of education: None     Highest education level: None   Occupational History     None   Social Needs     Financial resource strain: None     Food insecurity:     Worry: None      "Inability: None     Transportation needs:     Medical: None     Non-medical: None   Tobacco Use     Smoking status: Former Smoker     Years: 25.00     Types: Cigarettes, Pipe, Cigars     Last attempt to quit: 1978     Years since quittin.7     Smokeless tobacco: Never Used   Substance and Sexual Activity     Alcohol use: Yes     Alcohol/week: 60.0 standard drinks     Comment: drinks 2 drinks daily     Drug use: No     Sexual activity: Not Currently   Lifestyle     Physical activity:     Days per week: None     Minutes per session: None     Stress: None   Relationships     Social connections:     Talks on phone: None     Gets together: None     Attends Gnosticism service: None     Active member of club or organization: None     Attends meetings of clubs or organizations: None     Relationship status: None     Intimate partner violence:     Fear of current or ex partner: None     Emotionally abused: None     Physically abused: None     Forced sexual activity: None   Other Topics Concern     Parent/sibling w/ CABG, MI or angioplasty before 65F 55M? No   Social History Narrative    2019:   11 years ago; has 2 daughters who live nearby.  He quit smoking in  with a 25 pack-year history.  He rents a [employer] in [place] that is near a [employer] course.  He is a retired .  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  He identifies as Denominational, but drifted away from attending [employer] after his wife's death.  His daughters do housecleaning and grocery shopping for him.  He used to enjoy golf, but has no energy for it now.      Clifford Walsh (Ann), CNP    [facility]    [facility] cell:  [phone]        PHYSICAL EXAMINATION:   BP (!) 120/36 (BP Location: Left arm, Patient Position: Sitting, Cuff Size: Adult Regular)   Pulse 54   Temp 96.1  F (35.6  C) (Tympanic)   Resp 18   Ht 1.753 m (5' 9.02\")   Wt 65.8 " kg (145 lb)   SpO2 96%   BMI 21.40 kg/m     Wt Readings from Last 10 Encounters:   10/09/19 65.8 kg (145 lb)   10/07/19 65.8 kg (145 lb)   10/07/19 65.8 kg (145 lb)   10/04/19 66.5 kg (146 lb 9.7 oz)   09/24/19 67.9 kg (149 lb 12.8 oz)   07/31/19 68.1 kg (150 lb 3.2 oz)   07/17/19 71.7 kg (158 lb)   06/18/19 71.7 kg (158 lb)   06/05/19 71.7 kg (158 lb)   06/03/19 71.7 kg (158 lb)      ECOG performance status: 1  GENERAL APPEARANCE: Healthy, alert and in no acute distress.  HEENT: Sclerae anicteric. PERRLA. Oropharynx without ulcers, lesions, or thrush.  NECK: Supple. No asymmetry or masses.  LYMPHATICS: No palpable cervical, supraclavicular, axillary, or inguinal lymphadenopathy.  RESP: Lungs clear to auscultation bilaterally without rales, rhonchi or wheezes.  CARDIOVASCULAR: Regular rate and rhythm. Normal S1, S2; no S3 or S4. No murmur, gallop, or rub.  ABDOMEN: Soft, nontender. Bowel sounds normal. No palpable organomegaly or masses.  MUSCULOSKELETAL: Extremities without gross deformities noted. No edema of bilateral lower extremities.  SKIN: No suspicious lesions or rashes.  NEURO: Alert and oriented x 3. Cranial nerves II-XII grossly intact.  PSYCHIATRIC: Mentation and affect appear normal.    LABORATORY RESULTS:  Infusion Therapy Visit on 10/09/2019   Component Date Value Ref Range Status     Units Ordered 10/07/2019 2   Final     ABO 10/07/2019 A   Final     RH(D) 10/07/2019 Pos   Final     Antibody Screen 10/07/2019 Neg   Final     Test Valid Only At 10/07/2019 [facility]      Final     Specimen Expires 10/07/2019 10/10/2019   Final     Crossmatch 10/07/2019 Red Blood Cells   Final     Unit Number 10/07/2019 [id number]   Final     Blood Component Type 10/07/2019 Red Blood Cells Leukocyte Reduced   Final     Division Number 10/07/2019 00   Final     Status of Unit 10/07/2019 Released to care unit 10/09/2019 1055   Preliminary     Blood Product Code 10/07/2019 [id number]   Final     Unit  Status 10/07/2019 [facility]   Preliminary     Unit Number 10/07/2019 [id number]   Final     Blood Component Type 10/07/2019 Red Blood Cells Leukocyte Reduced   Final     Division Number 10/07/2019 00   Final     Status of Unit 10/07/2019 Released to care unit 10/09/2019 1312   Preliminary     Blood Product Code 10/07/2019 [id number]   Final     Unit Status 10/07/2019 [facility]   Preliminary       IMAGING RESULTS:  Recent Results (from the past 744 hour(s))   CT Abdomen Pelvis w/o & w Contrast [[id number]]    Narrative    CT ABDOMEN PELVIS W/O & W CONTRAST 9/26/2019 2:20 PM    HISTORY: Gross hematuria.    CONTRAST:  100mL Isovue-370.    TECHNIQUE: Abdomen and pelvis CT are performed without and with IV  contrast.    The non-IV enhanced the images are utilized mainly to assess for  urolithiasis or other pathological calcifications. On the delayed IV  enhanced portion of the study the kidneys, ureters, and bladder are  assessed.  The liver, spleen, pancreas, adrenal glands,  retroperitoneum, and abdominal aorta are also assessed. Finally,  pelvic anatomy is assessed on the pelvis portion of the CT.    Radiation dose for this scan is reduced using automated exposure  control, adjustment of mA and/or kV according to patient size, or  iterative reconstruction technique.    COMPARISON: None.    FINDINGS:    Abdomen: A 1 cm hyperdense cyst is present in the right kidney. Two  simple cysts are present in the left kidney. There is no  ureterolithiasis or hydronephrosis. Numerous noncalcified nodules are  seen at the lung bases, the largest which measures 12 mm on the right  posteromedially in the lower lobe on axial image 1. There is a 2.6 cm  osseous lesion involving the T12 vertebral body. There is a cystic  lesion in the spleen that measures 1.5 cm. It is nonspecific.  Statistically solitary splenic lesions are benign.    Pelvis: The prostate gland is enlarged. There is contiguous soft  tissue abnormality with the prostate gland  extending left  posterolateral. There is suspected left seminal vesicle invasion and  left anterolateral rectal wall invasion is suspected as well. The  prostate process protrudes up into the bladder and bladder wall  invasion is not excluded. There is perivesicle stranding and outer  bladder wall ill-definition. This may relate to a component of  cystitis. There is a fracture involving the right initial ramus on  axial image 85 of series 5. This is at least subacute if not chronic.  A mildly enlarged left pelvic sidewall lymph node on axial image 65  measures 1 cm. A borderline enlarged right pelvic sidewall lymph node  is seen on axial image 65 and measures 0.9 cm.  An enlarged lymph node  in the lower mesenteric fat of the left paramidline pelvis on axial  image 61 measures 0.9 cm in short axis dimension. Sigmoid  diverticulosis is present.      Impression    IMPRESSION:  1. Prostate malignancy is suspected with invasion of adjacent  structures and pelvic adenopathy.  2. There is an osseous lesion involving T12 which is nonspecific but  would have to be of concern for osseous metastatic disease, given  other findings.  3. Nodules at the lung bases are of concern for pulmonary metastatic  disease. Dedicated chest CT could further assess, if clinically  warranted.    BLANCA BAE MD   CT Chest Pulmonary Embolism w Contrast    Narrative    CT CHEST WITH CONTRAST  9/30/2019 2:05 PM    HISTORY: Evaluate pulmonary nodules seen on a recent CT of the abdomen  and pelvis.     COMPARISON: A CT of the abdomen and pelvis on 9/26/2019 which included  the lower chest.    TECHNIQUE: Routine transverse CT imaging of the chest was performed  following the uneventful intravenous administration of 68 mL Isovue  370 contrast. Radiation dose for this scan was reduced using automated  exposure control, adjustment of the mA and/or kV according to patient  size, or iterative reconstruction technique.    FINDINGS: The heart size is  normal.No enlarged lymph node or other  abnormal mediastinal mass is seen. There is calcification within the  thoracic aorta. The vascular structures are otherwise unremarkable.  There are innumerable nodules seen diffusely throughout both lungs.  The largest is a collection of nodules in the left midlung measuring  up to 2.4 cm demonstrated on series 4 image 68. On the right there is  a 1.7 cm long nodule in the right infrahilar region on series 4 image  74. There is an additional 2.7 cm long mass just to the right of the  heart border on series 4 image 116. There are numerous smaller nodular  densities seen diffusely throughout both lungs. The lungs are  otherwise clear with no consolidation or infiltrate seen. No  pneumothorax is demonstrated. No pleural effusion is identified. There  are degenerative changes in the spine. The coronal series 6 image 94  suggests a 2.3 cm area of increased density in the right aspect of the  T12 vertebral body. This is not calcified. No other osseous  abnormality is noted. No chest wall pathology is seen. The visualized  upper abdomen is unremarkable.      Impression    IMPRESSION:   1. There are innumerable bilateral pulmonary nodules as described  above. Metastatic disease needs to be considered.  2. There is a 2.3 cm area of increased attenuation within the right  aspect of the T12 vertebral body. This is a nonspecific finding but  metastatic involvement needs to be considered.    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  I discussed with the imaging most recent CT scan.  I also reviewed with the patient the pathology report in details.  I talked about the natural history of large  cell neuroendocrine tumor of the prostate.  We talked about staging.  Patient had stage IV disease.  We talked about palliative chemotherapy.  I would recommend chemotherapy with carboplatin and etoposide.  The patient will receive carboplatin with AUC of 5-day 1 and etoposide 80 mg/m  to be given daily days 3 every 21 days. I recommend carboplatin and etoposide regimen. The patient will be receiving carboplatin  AUC 5 intravenously on day 1 and etoposide 100 mg per metered square intravenously on day 1-3 of each chemotherapy cycle of 28 days. We will plan to give him 4 cycles of chemotherapy. We discussed the rationale behind using combination Carboplatin and etoposide in detail as well as major side effects including, but not limited to immediate/short term side effects of acute infusion reaction/anaphylaxis, life-threatening infection, flu-like syndrome (headache, weakness, fever, shakes, aches, pains, and sweating), nausea/vomiting, diarrhea/constipation, mucositis, edema, rare alopecia, rash, anemia, neutropenia, thrombocytopenia and bleeding, and weakness and fatigue as well as risk of long-term side effect of rare duration and dose dependent peripheral neuropathy and pulmonary toxicity. Patient instructed to call with signs or symptoms of infection including, but not limited to temperature greater than or equal to 100.5 degrees Fahrenheit, chills, severe sore throat, ear or sinus pain, mouth sores, cough, painful urination, and/or non-healing wound. The patient was given written information about Carboplatin and etoposide, agrees to proceed with treatment, and denies any further questions at this time. We will be planning to arrange for CT scan to assess response to treatment after 2 cycles of treatment.     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  Recommend continue to monitor CBC weekly for blood transfusion if required.    (I10) Essential hypertension, benign  Blood  pressures currently well controlled on Norvasc 10 mg orally daily.  Patient currently on losartan 100 mg orally daily.    (M10.9) Gout, unspecified cause, unspecified chronicity, unspecified site  Patient currently on allopurinol 100 mg orally daily.    The patient is ready to learn, no apparent learning barriers were identified, Diagnosis and treatment plans were explained to the patient. The patient expressed understanding of the content. The patient questions were answered to his satisfaction.    Virginia Hebert MD    Chart documentation with Dragon Voice recognition Software. Although reviewed after completion, some words and grammatical errors may remain.    Again, thank you for allowing me to participate in the care of your patient.        Sincerely,        Virginia Hebert MD

## 2019-10-09 NOTE — PATIENT INSTRUCTIONS
Please arrange for chemotherapy with carboplatin and etoposide.  Port-A-Cath insertion.  Patient will call us to start chemotherapy.  I will see the patient today of chemotherapy.

## 2019-10-10 PROBLEM — C7A.8: Status: ACTIVE | Noted: 2019-01-01

## 2019-10-10 PROBLEM — C7B.8: Status: ACTIVE | Noted: 2019-01-01

## 2019-10-10 NOTE — TELEPHONE ENCOUNTER
Daughter Latrice is calling regarding Andres and chemo.  MD Hebert had a conference call with family regarding chemo.  Jeremias Pollard is calling today to cancel appointment with MD Vega on 10/15/2019.  FNA advised to contact urology direct.

## 2019-10-14 PROBLEM — E46 PROTEIN-CALORIE MALNUTRITION (H): Status: ACTIVE | Noted: 2019-01-01

## 2019-10-14 NOTE — LETTER
10/14/2019        RE: Andres Sow  [address]  [facility] [address]        [place] [facility]  [place] [facility] Record Number:  [id number]  [facility] of [facility] where encounter took place[facility] ([facility]) [[id number]]  Chief Complaint   Patient presents with     RECHECK       HPI:    Andres Sow  is a 85 year old (11/21/1933), who is being seen today for an episodic care visit.  HPI information obtained from: facility chart records, facility staff, patient report and [facility] chart review. Today's concern is:     Severe protein-calorie malnutrition (H)  Metastatic cancer (H)  Gross hematuria  Anemia due to blood loss, acute  Slow transit constipation  Essential hypertension   Patient with history of chronic Tamez catheter and gross hematuria over the past 3 months.  Recently diagnosed with multiple large lymph nodes, and prostate mass  recently hospitalized for syncopal episode due to significant anemia.  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       In TCU on 10/7  he was noted to have hemoglobin of 7.6, which was 1.5 g drop in 3 days -received 2 units of PRBC on 10/9.  Establish care with Dr. Aponte from oncology also on 10/9.  He recommended palliative chemotherapy at that time.    Patient reports she is feeling better since his blood transfusion.  Had significant gross hematuria over the weekend, hemoglobin checked and was    Past Medical and Surgical History reviewed in Epic today.    MEDICATIONS:  Current Outpatient Medications   Medication Sig Dispense Refill     acetaminophen (TYLENOL) 500 MG tablet Take 1,000 mg by mouth 3 times daily       allopurinol (ZYLOPRIM) 100 MG tablet Take 1 tablet (100 mg) by mouth daily 90 tablet 3     amLODIPine (NORVASC) 10 MG tablet Take 1 tablet (10 mg) by mouth daily 90 tablet 3     lidocaine (XYLOCAINE) 2 % external gel as needed for moderate pain Apply to Tip of penis topically as needed  "for pain Up to four times daily       LORazepam (ATIVAN) 0.5 MG tablet Take 1 tablet (0.5 mg) by mouth 2 times daily as needed for anxiety (Patient not taking: Reported on 10/9/2019) 20 tablet 0     losartan (COZAAR) 100 MG tablet Take 1 tablet (100 mg) by mouth daily 90 tablet 3     methylphenidate (RITALIN) 10 MG tablet Take 1 tablet (10 mg) by mouth 2 times daily 60 tablet 0     Multiple Vitamins-Minerals (PRESERVISION AREDS 2) CAPS Take 1 capsule by mouth 2 times daily        order for DME Equipment being ordered: Wheelchair    Dx:  Severe aortic stenosis, neoplastic fatigue 1 each 0     oxyCODONE (ROXICODONE) 5 MG tablet Take 1 tablet (5 mg) by mouth every 4 hours as needed (Patient not taking: Reported on 10/9/2019) 20 tablet 0     tamsulosin (FLOMAX) 0.4 MG capsule Take 1 capsule (0.4 mg) by mouth daily 90 capsule 3     vitamin D3 (CHOLECALCIFEROL) 2000 units (50 mcg) tablet Take 1 tablet by mouth every evening           REVIEW OF SYSTEMS:  4 point ROS including Respiratory, CV, GI and , other than that noted in the HPI,  is negative    Objective:  /49   Pulse 50   Temp 98.4  F (36.9  C)   Resp 20   Ht 1.753 m (5' 9\")   Wt 63.5 kg (140 lb)   SpO2 95%   BMI 20.67 kg/m     Exam:  GENERAL APPEARANCE:  Alert, in no distress, oriented, thin, cooperative  RESP:  respiratory effort and palpation of chest normal, lungs clear to auscultation , no respiratory distress  CV:  Palpation and auscultation of heart done , regular rate and rhythm, no murmur, rub, or gallop, no edema, +2 pedal pulses  ABDOMEN:  no guarding or rebound, bowel sounds normal  :    light catheter - slightly pink tinged yellow urine  M/S:   generalized weakness, no gross joint deformities  SKIN:  Inspection of skin and subcutaneous tissue baseline  NEURO:   Cranial nerves 2-12 are normal tested and grossly at patient's baseline  PSYCH:  oriented X 3, normal insight, judgement and memory, affect and mood normal    Labs:   Recent labs " in EPIC reviewed by me today.  and   Most Recent 3 CBC's:  Recent Labs   Lab Test 10/14/19  0731 10/12/19  1119 10/07/19  0734  10/04/19  0449 10/03/19  0531   WBC 8.3  --   --   --  9.5 9.1   HGB 8.8* 9.7* 7.6*   < > 9.0* 7.9*   MCV 92  --   --   --  90 90     --   --   --  198 187    < > = values in this interval not displayed.       ASSESSMENT/PLAN:  (C79.9) Metastatic cancer (H)  (primary encounter diagnosis)  Comment: New diagnosis, following with Dr Hebert with oncology.   Plan: Has apt for consult on port-a-cath on 10/15, plans to start chemo, but is hoping to complete therapy and discontinue from TCU first.     (E43) Severe protein-calorie malnutrition (H)  Comment: BMI 20.7, report weight loss, poor appetite.   Plan: Diet at tolerating, dietician following in TCU and managing supplement.     (R31.0) Gross hematuria  (D62) Anemia due to blood loss, acute  Comment: Received 2 units PRBC last week. Hgb stable - see labs. Bleeding significant decreased today. Had f/u with Dr Vega scheduled this week, but family cancelled apt.   Plan: Weekly CBC and PRN for s/s of anemia.     (K59.01) Slow transit constipation  Comment: Needed enema over the weekend, baseline is BM q3-4 days.   Plan: Will add Senna-s 1 tab at bedtime and miralax daily PRN. Encourage appropriate fluid intake. Continue to monitor and adjust.     (I10) Essential hypertension  Comment: 's-130's  Plan: Continue amlodipine 10mg daily, losartan 100mg daily. Continue to monitor - may need dose reduction if consistently <120 give age and overall frailty.     transcribed by : Sarah Rueda  Orders:  1. CBC every Monday - Dx: aneamia  2. Lotion to BLE BID  For itching , dry skin,  3. Senna - S (8.6/50mg) - 1 tab PO at bedtime - dx: constipation. Hold for loose stools  4. Miralax - 17 gram PO every day PRN - give if 0 BM >48 hrs - dx: constipation      Electronically signed by:  ALEXX Morris CNP     Disclaimer:   This note consists of symbols derived from keyboarding, dictation, and/or voice recognition software.  As a result, there may be errors in the script that have gone undetected.  Please consider this when interpreting information found in the chart.            Sincerely,        ALEXX Morris CNP

## 2019-10-14 NOTE — TELEPHONE ENCOUNTER
Spoke with Andres today.  He is due for a catheter exchange around the 24th.  I told him that there should be staff available at [facility] to exchange his catheter if he didn't want to come to clinic but he is concerned with the pain associated with the exchange and would like to have it done here.  I told him that a nurse visit would be appropriate on the 28th or 29th because Dr. Vega will also be in clinic and available if needed.  He was going to speak with his daughter about transportation and call the clinic back to let us know how to proceed.    Lilly MADRID CMA

## 2019-10-14 NOTE — TELEPHONE ENCOUNTER
Reason for Call:  Other appointment    Detailed comments: Pt daughter states pt needs to have cath change and appt ,cx appt previously but now needs to schedule again, no consent to communicate on file so please call pt to discuss  Phone Number Patient can be reached at: [phone] pt cell    Best Time: today    Can we leave a detailed message on this number? YES    Call taken on 10/14/2019 at 9:09 AM by Annalee Golden

## 2019-10-14 NOTE — PROGRESS NOTES
[place] [facility]  [place] [facility] Record Number:  [id number]  Place of Service where encounter took place:  [facility] ([facility]) [[id number]]  Chief Complaint   Patient presents with     RECHECK       HPI:    Andres Sow  is a 85 year old (11/21/1933), who is being seen today for an episodic care visit.  HPI information obtained from: facility chart records, facility staff, patient report and [facility] chart review. Today's concern is:  {FGS DX:543005}    Past Medical and Surgical History reviewed in Epic today.    MEDICATIONS:  Current Outpatient Medications   Medication Sig Dispense Refill     acetaminophen (TYLENOL) 500 MG tablet Take 1,000 mg by mouth 3 times daily       allopurinol (ZYLOPRIM) 100 MG tablet Take 1 tablet (100 mg) by mouth daily 90 tablet 3     amLODIPine (NORVASC) 10 MG tablet Take 1 tablet (10 mg) by mouth daily 90 tablet 3     lidocaine (XYLOCAINE) 2 % external gel as needed for moderate pain Apply to Tip of penis topically as needed for pain Up to four times daily       LORazepam (ATIVAN) 0.5 MG tablet Take 1 tablet (0.5 mg) by mouth 2 times daily as needed for anxiety (Patient not taking: Reported on 10/9/2019) 20 tablet 0     losartan (COZAAR) 100 MG tablet Take 1 tablet (100 mg) by mouth daily 90 tablet 3     methylphenidate (RITALIN) 10 MG tablet Take 1 tablet (10 mg) by mouth 2 times daily 60 tablet 0     Multiple Vitamins-Minerals (PRESERVISION AREDS 2) CAPS Take 1 capsule by mouth 2 times daily        order for DME Equipment being ordered: Wheelchair    Dx:  Severe aortic stenosis, neoplastic fatigue 1 each 0     oxyCODONE (ROXICODONE) 5 MG tablet Take 1 tablet (5 mg) by mouth every 4 hours as needed (Patient not taking: Reported on 10/9/2019) 20 tablet 0     tamsulosin (FLOMAX) 0.4 MG capsule Take 1 capsule (0.4 mg) by mouth daily 90 capsule 3     vitamin D3 (CHOLECALCIFEROL) 2000 units (50 mcg) tablet Take 1 tablet by mouth every evening    "        REVIEW OF SYSTEMS:  4 point ROS including Respiratory, CV, GI and , other than that noted in the HPI,  is negative    Objective:  /49   Pulse 50   Temp 98.4  F (36.9  C)   Resp 20   Ht 1.753 m (5' 9\")   Wt 63.5 kg (140 lb)   SpO2 95%   BMI 20.67 kg/m    Exam:  GENERAL APPEARANCE:  Alert, in no distress, oriented, thin, cooperative  RESP:  respiratory effort and palpation of chest normal, lungs clear to auscultation , no respiratory distress  CV:  Palpation and auscultation of heart done , regular rate and rhythm, no murmur, rub, or gallop, no edema, +2 pedal pulses  ABDOMEN:  no guarding or rebound, bowel sounds normal  :    light catheter - slightly pink tinged yellow urine  M/S:   generalized weakness, no gross joint deformities  SKIN:  Inspection of skin and subcutaneous tissue baseline  NEURO:   Cranial nerves 2-12 are normal tested and grossly at patient's baseline  PSYCH:  oriented X 3, normal insight, judgement and memory, affect and mood normal    Labs:   Recent labs in [facility] reviewed by me today.  and   Most Recent 3 CBC's:  Recent Labs   Lab Test 10/14/19  0731 10/12/19  1119 10/07/19  0734  10/04/19  0449 10/03/19  0531   WBC 8.3  --   --   --  9.5 9.1   HGB 8.8* 9.7* 7.6*   < > 9.0* 7.9*   MCV 92  --   --   --  90 90     --   --   --  198 187    < > = values in this interval not displayed.       ASSESSMENT/PLAN:  {FGS DX:950572}    transcribed by : Sarah Rueda  Orders:  1. CBC every Monday - Dx: aneamia  2. Lotion to BLE BID  For itching , dry skin,  3. Senna - S (8.6/50mg) - 1 tab PO at bedtime - dx: constipation. Hold for loose stools  4. Miralax - 17 gram PO every day PRN - give if 0 BM >48 hrs - dx: constipation    {fgstime1:074963}  Electronically signed by:  Ella Silva, ALEXX CNP ***  {Providers Please double check the med list (in the plan section >> meds & orders tab) and Discontinue any of the meds flagged by the TC to be " discontinued}

## 2019-10-15 PROBLEM — R40.20 LOSS OF CONSCIOUSNESS (H): Status: ACTIVE | Noted: 2019-01-01

## 2019-10-15 NOTE — H&P
[facility]    History and Physical - Hospitalist Service       Date of Admission:  10/15/2019    Assessment & Plan   Andres Sow is a 85 year old male admitted on 10/15/2019. He presents after a syncopal episode.    Syncope  Syncopal episode lasting less than 30 seconds, occurred while seated.  He did experience a prodrome.  No associated chest pain, palpitations, or dyspnea.  No postictal period. Acknowledges feeling weak and lightheaded frequently over the last few weeks.   Recent admission for syncopal episode on 9/30/2019, found to be anemic at 6.8 and received 2 units PRBCs.  Today his hemoglobin is consistent with his baseline at 9.3. Will monitor on telemetry and rehydrate overnight. Echo performed 9/30/19, no indication to repeat.  Likely due to multiple factors including anemia from malignancy and chronic blood loss, dehydration from poor p.o. intake, and severe aortic stenosis.  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 Biopsy identified as neuroendocrine. Per Dr. Vega, unlikely to be prostatic in origin. Follows with Dr. Hebert who has proposed palliative chemotherapy. He was in the clinic today to discuss port-a-cath placement when he experienced his syncopal episode.  - Palliative consult to discuss goals of care    Anemia in neoplastic disease  Anemia due to hematuria  Chronic hematuria  Hgb 9.3 today, consistent with recent baseline. Has been anemic since 5/2019. Likely due both active malignancy and chronic hematuria from prostate cancer.   - Daily hgb    Hyperlipidemia   Noted on chart, not currently on statin.  - F/U outpatient    Bradycardia  Chronic, noted at least as far back as 2012. Runs as low as 40s without becoming symptomatic.    Hypertension  Normotensive since admission.   "Managed with amlodipine 10 mg and losartan 100 mg.  - Continue amlodipine and losartan.    CKD stage 3  Creatinine 1.74 on admission, up from his recent baseline of 1.20-1.44. Likely due to dehydration from poor PO intake.  - LR bolus in ED  - LR @ 125 mL/hr  - Recheck in AM    Aortic valve stenosis, severe  Grade 3/6 systolic murmur, loudest at RSB. Echo 9/30/19, following previous syncopal episode, showed severe aortic stenosis, with a mean aortic gradient of 43 mmHg, and an aortic valve area of 0.7 cm . While this is now his second episode of syncope, he has not experienced chest pain or symptoms concerning for HF. His aortic stenosis has increased significantly since previous on 7/27/2012.  - Consider cardiology f/u, although patient seems to be leaning in the direction of focus on palliative care and may not be interested in pursuing aggressive management.    Gout  No active flares.   - Continue allopurinol.          Diet: Regular  DVT Prophylaxis: Pneumatic Compression Devices  Tamez Catheter: in place, indication:    Code Status: DNR/DNI    Disposition Plan   Expected discharge: Tomorrow, recommended to back to TCU once syncope evaluation complete.  Entered: Ramez Regalado PA-C 10/15/2019, 2:03 PM     The patient's care was discussed with the Attending Physician, Dr. Terri Rodriguez, Patient and Patient's Family.    Ramez Regalado PA-C  [facility]    ______________________________________________________________________    Chief Complaint   Syncope    History is obtained from the patient and patient's daughter    History of Present Illness   Andres Sow is a 85 year old male with a history of neuroendocrine prostate cancer with mets to lungs and bone, hypertension, gout, severe aortic stenosis, stage III CKD who presents following a syncopal episode.  He was at an appointment in the [place] clinic when he started to feel lightheaded and \"off.\"  He then slumped forward " in his chair and became unresponsive for less than 30 seconds.  His daughter called for a nurse by the time she arrived he was already waking up.  There was no postictal period.  He denies experiencing any chest pain, dyspnea, or palpitations before or after the event.  A rapid response team was called but he was already back to his baseline mental status by the time they arrived.  He has numerous issues predisposing him to syncope including anemia, chronic bradycardia, severe aortic stenosis, and likely dehydration.    He had a similar syncopal episode on 9/30/2019 while ambulatory in his own home, although it is unclear whether he experienced a prodrome with that episode.  He was seen at [facility] where he was found to be anemic at 6.8 and received 2 units PRBCs.  During his work-up, a prostatic mass was identified and found to be a neuroendocrine tumor with mets to bone and lung. He is following with Dr. Hebert who has proposed palliative chemotherapy.    Review of Systems    The 10 point Review of Systems is negative other than noted in the HPI or here.     Past Medical History    I have reviewed this patient's medical history and updated it with pertinent information if needed.   Past Medical History:   Diagnosis Date     Hypertension      Unspecified vertiginous syndromes and labyrinthine disorders     seen in ED after starting metoprolol; continued on it without adverse reaction       Past Surgical History   I have reviewed this patient's surgical history and updated it with pertinent information if needed.  Past Surgical History:   Procedure Laterality Date     COLONOSCOPY  8/30/2007    Repeat colonoscopy in 3 years for surveillance     HC REMOVAL OF TONSILS,<13 Y/O         Social History   I have reviewed this patient's social history and updated it with pertinent information if needed.  Social History     Tobacco Use     Smoking status: Former Smoker     Years: 25.00     Types: Cigarettes, Pipe,  Cigars     Last attempt to quit: 1978     Years since quittin.8     Smokeless tobacco: Never Used   Substance Use Topics     Alcohol use: Yes     Alcohol/week: 60.0 standard drinks     Comment: drinks 2 drinks daily     Drug use: No       Family History   I have reviewed this patient's family history and updated it with pertinent information if needed.   Family History   Problem Relation Age of Onset     Hypertension Father      Breast Cancer Sister      Cerebrovascular Disease Sister        Prior to Admission Medications   Prior to Admission Medications   Prescriptions Last Dose Informant Patient Reported? Taking?   LORazepam (ATIVAN) 0.5 MG tablet   No No   Sig: Take 1 tablet (0.5 mg) by mouth 2 times daily as needed for anxiety   Patient not taking: Reported on 10/9/2019   Multiple Vitamins-Minerals (PRESERVISION AREDS 2) CAPS  Self Yes No   Sig: Take 1 capsule by mouth 2 times daily    acetaminophen (TYLENOL) 500 MG tablet 10/14/2019 at pm  Yes Yes   Sig: Take 1,000 mg by mouth 3 times daily   allopurinol (ZYLOPRIM) 100 MG tablet 10/14/2019 at Unknown time Self No Yes   Sig: Take 1 tablet (100 mg) by mouth daily   amLODIPine (NORVASC) 10 MG tablet  Self No No   Sig: Take 1 tablet (10 mg) by mouth daily   ampicillin (PRINCIPEN) 500 MG capsule   Yes No   Sig: Take 500 mg by mouth 3 times daily   finasteride (PROSCAR) 5 MG tablet   Yes No   Sig: Take 5 mg by mouth daily   lidocaine (XYLOCAINE) 2 % external gel   Yes No   Sig: as needed for moderate pain Apply to Tip of penis topically as needed for pain Up to four times daily   losartan (COZAAR) 100 MG tablet 10/14/2019 at Unknown time Self No Yes   Sig: Take 1 tablet (100 mg) by mouth daily   melatonin 3 MG tablet   Yes No   Sig: Take 3 mg by mouth nightly as needed for sleep   methylphenidate (RITALIN) 10 MG tablet 10/14/2019 at Unknown time  No Yes   Sig: Take 1 tablet (10 mg) by mouth 2 times daily   order for DME   No No   Sig: Equipment being  ordered: Wheelchair    Dx:  Severe aortic stenosis, neoplastic fatigue   oxyCODONE (ROXICODONE) 5 MG tablet 10/14/2019 at Unknown time  No Yes   Sig: Take 1 tablet (5 mg) by mouth every 4 hours as needed   polyethylene glycol (MIRALAX/GLYCOLAX) packet   Yes No   Sig: Take 1 packet by mouth daily as needed for constipation   senna-docusate (SENOKOT-S/PERICOLACE) 8.6-50 MG tablet   Yes No   Sig: Take 1 tablet by mouth At Bedtime   tamsulosin (FLOMAX) 0.4 MG capsule 10/14/2019 at Unknown time Self No Yes   Sig: Take 1 capsule (0.4 mg) by mouth daily   vitamin D3 (CHOLECALCIFEROL) 2000 units (50 mcg) tablet 10/14/2019 at Unknown time Self Yes Yes   Sig: Take 1 tablet by mouth every evening      Facility-Administered Medications: None     Allergies   Allergies   Allergen Reactions     Hydrochlorothiazide Other (See Comments)     Low Potassium     Lisinopril Swelling     Neck swelling/airway     Metoprolol Other (See Comments)     He was seen in the emergency room for lightheadedness after I increased his metoprolol. Patient stopped taking it-noted 6/18/2019.        Physical Exam   Vital Signs: Temp: 97.7  F (36.5  C) Temp src: Oral BP: (!) 152/53 Pulse: (!) 49 Heart Rate: (!) 49 Resp: 8 SpO2: 97 %      Weight: 143 lbs 0 oz    General: Appears calm, comfortable, nontoxic. Answers questions quickly and appropriately with clear speech. No apparent distress.  Skin: Pink, warm, dry.  Eyes: PERRL. Sclera are white.  HENT:  Normocephalic, atraumatic. Oropharynx is moist, without lesions or exudate.  Lymph/Hematologic: No occipital, anterior/posterior cervical, supraclavicular, or axillary lymphadenopathy.  CV: RRR, 3/6 systolic murmur without discernible S1/S2. Radial pulses equal bilaterally. No lower extremity edema.  Respiratory: CTA and equal bilaterally. Normal respiratory effort.  GI: Soft, nontender. Normal bowel sounds.  : Catheter in place. Gross hematuria.  Musculoskeletal: Moving all extremities well. Normal muscle  bulk and tone. Large olecranon bursitis on left elbow, chronic.  Neuro: Memory and cognition appear normal. CN II - XII grossly intact. Symmetrical extremity strength.  Psych: Normal mood and affect.    Data   Data reviewed today: I reviewed all medications, new labs and imaging results over the last 24 hours. I personally reviewed the EKG tracing showing sinus bradycardia with LBBB and without ischemic changes.    Recent Labs   Lab 10/15/19  1022 10/14/19  0731 10/12/19  1119   WBC 8.6 8.3  --    HGB 9.3* 8.8* 9.7*   MCV 92 92  --     221  --    INR 0.95  --   --      --   --    POTASSIUM 4.5  --   --    CHLORIDE 101  --   --    CO2 26  --   --    BUN 60*  --   --    CR 1.74*  --   --    ANIONGAP 7  --   --    NUVIA 9.7  --   --    *  --   --    ALBUMIN 2.8*  --   --    PROTTOTAL 6.9  --   --    BILITOTAL 0.4  --   --    ALKPHOS 72  --   --    ALT 21  --   --    AST 28  --   --    TROPI <0.015  --   --      No results found for this or any previous visit (from the past 24 hour(s)).

## 2019-10-15 NOTE — LETTER
10/15/2019         RE: Andres Sow  [address]  [facility] [address]        Dear Colleague,    Thank you for referring your patient, Andres Sow, to the [facility]. Please see a copy of my visit note below.    Surgical Consultation/History and Physical  [facility] General Surgery    Andres is seen in consultation for neuroendocrine carcinoma, at the request of Eveline Shanks MD.    Chief Complaint:  Neuroendocrine carcinoma    HPI:  Andres Sow presents in consultation today for evaluation of infusion port placement. He has a history of neuroendocrine carcinoma. The patient is right hand dominant, has never had central venous access, pneumothorax, lung surgery, history of dialysis or renal failure, and denies repetitive movements with the upper extremity (such as pitching, chopping wood, bowling, or hunting).       Patient feels very weak.  He is uncertain if he would like to go through with chemotherapy.      Patient Active Problem List   Diagnosis     Hyperlipidemia LDL goal <100     Bradycardia     Colon polyps     Anxiety     Hypertension goal BP (blood pressure) < 150/90     Advanced directives, counseling/discussion     CKD (chronic kidney disease) stage 3, GFR 30-59 ml/min (H)     Aortic valve stenosis, severe     Gout, unspecified cause, unspecified chronicity, unspecified site     Essential hypertension, benign     Syncope     Anemia due to hematuria     Urinary tract infection     Suspected malignant neoplasm     History of gout     Urinary retention     Gross hematuria     Anemia due to blood loss, acute     Malignant neoplasm (H)     Anemia in neoplastic disease     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)     Protein-calorie malnutrition (H)     Past Medical History:   Diagnosis Date     Hypertension      Unspecified vertiginous syndromes and labyrinthine disorders     seen in ED after starting metoprolol; continued on it without adverse  reaction     Past Surgical History:   Procedure Laterality Date     COLONOSCOPY  2007    Repeat colonoscopy in 3 years for surveillance     HC REMOVAL OF TONSILS,<11 Y/O       Family History   Problem Relation Age of Onset     Hypertension Father      Breast Cancer Sister      Cerebrovascular Disease Sister      Social History     Tobacco Use     Smoking status: Former Smoker     Years: 25.00     Types: Cigarettes, Pipe, Cigars     Last attempt to quit: 1978     Years since quittin.8     Smokeless tobacco: Never Used   Substance Use Topics     Alcohol use: Yes     Alcohol/week: 60.0 standard drinks     Comment: drinks 2 drinks daily      History   Drug Use No     Current Outpatient Medications   Medication Sig Dispense Refill     acetaminophen (TYLENOL) 500 MG tablet Take 1,000 mg by mouth 3 times daily       allopurinol (ZYLOPRIM) 100 MG tablet Take 1 tablet (100 mg) by mouth daily 90 tablet 3     amLODIPine (NORVASC) 10 MG tablet Take 1 tablet (10 mg) by mouth daily 90 tablet 3     lidocaine (XYLOCAINE) 2 % external gel as needed for moderate pain Apply to Tip of penis topically as needed for pain Up to four times daily       LORazepam (ATIVAN) 0.5 MG tablet Take 1 tablet (0.5 mg) by mouth 2 times daily as needed for anxiety (Patient not taking: Reported on 10/9/2019) 20 tablet 0     losartan (COZAAR) 100 MG tablet Take 1 tablet (100 mg) by mouth daily 90 tablet 3     melatonin 3 MG tablet Take 3 mg by mouth nightly as needed for sleep       methylphenidate (RITALIN) 10 MG tablet Take 1 tablet (10 mg) by mouth 2 times daily 60 tablet 0     Multiple Vitamins-Minerals (PRESERVISION AREDS 2) CAPS Take 1 capsule by mouth 2 times daily        order for DME Equipment being ordered: Wheelchair    Dx:  Severe aortic stenosis, neoplastic fatigue 1 each 0     oxyCODONE (ROXICODONE) 5 MG tablet Take 1 tablet (5 mg) by mouth every 4 hours as needed (Patient not taking: Reported on 10/9/2019) 20 tablet 0      "polyethylene glycol (MIRALAX/GLYCOLAX) packet Take 1 packet by mouth daily as needed for constipation       senna-docusate (SENOKOT-S/PERICOLACE) 8.6-50 MG tablet Take 1 tablet by mouth At Bedtime       tamsulosin (FLOMAX) 0.4 MG capsule Take 1 capsule (0.4 mg) by mouth daily 90 capsule 3     vitamin D3 (CHOLECALCIFEROL) 2000 units (50 mcg) tablet Take 1 tablet by mouth every evening       Allergies   Allergen Reactions     Hydrochlorothiazide Other (See Comments)     Low Potassium     Lisinopril Swelling     Neck swelling/airway     Metoprolol Other (See Comments)     He was seen in the emergency room for lightheadedness after I increased his metoprolol. Patient stopped taking it-noted 6/18/2019.      Review of Systems:   20 point ROS otherwise negative    Physical Exam:  BP (!) 85/37 (BP Location: Right arm, Patient Position: Sitting, Cuff Size: Adult Regular)   Pulse 60   Temp 97.5  F (36.4  C) (Oral)   Ht 1.753 m (5' 9\")   Wt 65 kg (143 lb 6.4 oz)   BMI 21.18 kg/m       Constitutional- Older than stated age, chronically ill appearing  Eyes: Anicteric, no injection.  PERRL  ENT:  Normocephalic, atraumatic, Nose midline, moist mucus membranes  Neck - supple, no LAD  Respiratory- Clear to auscultation bilaterally, good inspiratory effort  Cardiovascular - Heart RRR, no lift's, thrills, murmurs, rubs, or gallop.    Neuro - No focal neuro deficits, Alert and oriented x 3  Psych: Appropriate mood and affect  Musculoskeletal: In wheelchair  Skin: Warm, Dry    LABS:     Lab Results   Component Value Date    WBC 8.3 10/14/2019    HGB 8.8 (L) 10/14/2019    HCT 26.9 (L) 10/14/2019    MCV 92 10/14/2019     10/14/2019      No results found for: INR       INFORMED CONSENT:     A discussion of the indications, risks, benefits and alternatives to surgery was held with the patient, and the risks discussed include beeding, infection, thrombosis, stenosis, port malfunction or malposition, air embolism, pneumothorax, " hemothorax, or cardiac arrythmia . The patient understood the information given, questions addressed, and she wishes to proceed. She understands the need for maintenance of the infusion port at least once a month while in place.    ASSESSMENT AND PLAN:     Mr. Andres Sow is in need of an infusion port for prostate cancer.  During visit, patient passed out requiring transfer to the ED.  Prior to this occurring, I had a lengthy discussion with the patient regarding chemotherapy and he is uncertain if he would like to go through with chemotherapy.  He is to have a discussion with oncology with his daughter present and should he decide to undergo chemotherapy, I will be happy to facilitate port placement.  Given his current condition, I feel this is less likely.  Alternatively, patient could have a PICC placed to see how he will fair with chemotherapy initially and port could be placed at a later date.  Patient to ED for evaluation.    Doni Pierre DO on 10/15/2019 at 6:33 AM              Again, thank you for allowing me to participate in the care of your patient.        Sincerely,        Doni Pierre DO

## 2019-10-15 NOTE — ED NOTES
Pt presents to ER after having a syncopal episode in clinic PTA.     Pt has complex medical history including metastatic neuroendocrine cancer, anemia and malnutrition. Pt was recently admitted to hospital for anemia and received a blood transfusion.      Pt c/o weakness, lightheadedness.  He is alert and oriented.  Daughter is at bedside.

## 2019-10-15 NOTE — ED TRIAGE NOTES
This RN responded to a RRT called in the clinic, this patient was found in a wheelchair, awake and alert. The patient is an oncology patient and recently had a blood transfusion. The staff at the clinic state that he had a syncopal episode while sitting, never fell to the ground. The patient was in to see Dr. Vega for prostate cancer.

## 2019-10-15 NOTE — PLAN OF CARE
"Vitals stable. Patient c/o pain in lower back and neck, administered scheduled tylenol. Patient aware that PRN oxycodone available if needed but declines at this time.  Lung sounds diminished. Oxygen sats 97% on room air.  Tamez is intact, draining red/bloody urine.  Per patient this is normal for him and it comes and goes.  Hemoglobin recheck this evening was 8.7, MD notified, recheck ordered for tomorrow morning.  Patient has decreased appetite, ate minimal amount for dinner tonight, but was able to drink entire ensure supplement.  Patient declined skin assessment, denies any concerns. Noticed scratches on bilateral feet and patient has \"tennis elbow\" on left arm, denies any pain or discomfort.  Patient up with assist of 1.  IVF infusing.    Patient c/o sudden pain in bladder after repositioning.  Stated it felt like bladder was \"full.\"  Bladder scanned for 0ml.  Updated MD.  UA sample sent per orders. After 15 min patient stated pain was better, declined B&O suppository.  "

## 2019-10-15 NOTE — PLAN OF CARE
"[place] [facility] [place]    Patient admitted to room 2211 at approximately 1450 via wheel chair from emergency room. Patient was accompanied by transport tech.     Verbal SBAR report received from Poppy YUAN prior to patient arrival.     Patient ambulated to bed with stand-by assist. Patient alert and oriented X 3. Pain is controlled without any medications.  . Admission vital signs: Blood pressure (!) 142/59, pulse 51, temperature 97.7  F (36.5  C), temperature source Oral, resp. rate 15, height 1.753 m (5' 9\"), weight 64.9 kg (143 lb), SpO2 97 %. Patient was oriented to plan of care, call light, bed controls, tv, telephone, bathroom, and visiting hours.     Risk Assessment    The following safety risks were identified during admission: fall. Yellow risk band applied: YES.     Skin Initial Assessment    Unable to do skin assessment. Oncoming RN aware.         Vanessa Walsh RN      "

## 2019-10-15 NOTE — PROGRESS NOTES
Surgical Consultation/History and Physical  [facility] General Surgery    Andres is seen in consultation for neuroendocrine carcinoma, at the request of Eveline Shanks MD.    Chief Complaint:  Neuroendocrine carcinoma    HPI:  Andres Sow presents in consultation today for evaluation of infusion port placement. He has a history of neuroendocrine carcinoma. The patient is right hand dominant, has never had central venous access, pneumothorax, lung surgery, history of dialysis or renal failure, and denies repetitive movements with the upper extremity (such as pitching, chopping wood, bowling, or hunting).       Patient feels very weak.  He is uncertain if he would like to go through with chemotherapy.      Patient Active Problem List   Diagnosis     Hyperlipidemia LDL goal <100     Bradycardia     Colon polyps     Anxiety     Hypertension goal BP (blood pressure) < 150/90     Advanced directives, counseling/discussion     CKD (chronic kidney disease) stage 3, GFR 30-59 ml/min (H)     Aortic valve stenosis, severe     Gout, unspecified cause, unspecified chronicity, unspecified site     Essential hypertension, benign     Syncope     Anemia due to hematuria     Urinary tract infection     Suspected malignant neoplasm     History of gout     Urinary retention     Gross hematuria     Anemia due to blood loss, acute     Malignant neoplasm (H)     Anemia in neoplastic disease     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)     Protein-calorie malnutrition (H)     Past Medical History:   Diagnosis Date     Hypertension      Unspecified vertiginous syndromes and labyrinthine disorders     seen in ED after starting metoprolol; continued on it without adverse reaction     Past Surgical History:   Procedure Laterality Date     COLONOSCOPY  8/30/2007    Repeat colonoscopy in 3 years for surveillance     HC REMOVAL OF TONSILS,<11 Y/O       Family History   Problem Relation Age of Onset     Hypertension Father       Breast Cancer Sister      Cerebrovascular Disease Sister      Social History     Tobacco Use     Smoking status: Former Smoker     Years: 25.00     Types: Cigarettes, Pipe, Cigars     Last attempt to quit: 1978     Years since quittin.8     Smokeless tobacco: Never Used   Substance Use Topics     Alcohol use: Yes     Alcohol/week: 60.0 standard drinks     Comment: drinks 2 drinks daily      History   Drug Use No     Current Outpatient Medications   Medication Sig Dispense Refill     acetaminophen (TYLENOL) 500 MG tablet Take 1,000 mg by mouth 3 times daily       allopurinol (ZYLOPRIM) 100 MG tablet Take 1 tablet (100 mg) by mouth daily 90 tablet 3     amLODIPine (NORVASC) 10 MG tablet Take 1 tablet (10 mg) by mouth daily 90 tablet 3     lidocaine (XYLOCAINE) 2 % external gel as needed for moderate pain Apply to Tip of penis topically as needed for pain Up to four times daily       LORazepam (ATIVAN) 0.5 MG tablet Take 1 tablet (0.5 mg) by mouth 2 times daily as needed for anxiety (Patient not taking: Reported on 10/9/2019) 20 tablet 0     losartan (COZAAR) 100 MG tablet Take 1 tablet (100 mg) by mouth daily 90 tablet 3     melatonin 3 MG tablet Take 3 mg by mouth nightly as needed for sleep       methylphenidate (RITALIN) 10 MG tablet Take 1 tablet (10 mg) by mouth 2 times daily 60 tablet 0     Multiple Vitamins-Minerals (PRESERVISION AREDS 2) CAPS Take 1 capsule by mouth 2 times daily        order for DME Equipment being ordered: Wheelchair    Dx:  Severe aortic stenosis, neoplastic fatigue 1 each 0     oxyCODONE (ROXICODONE) 5 MG tablet Take 1 tablet (5 mg) by mouth every 4 hours as needed (Patient not taking: Reported on 10/9/2019) 20 tablet 0     polyethylene glycol (MIRALAX/GLYCOLAX) packet Take 1 packet by mouth daily as needed for constipation       senna-docusate (SENOKOT-S/PERICOLACE) 8.6-50 MG tablet Take 1 tablet by mouth At Bedtime       tamsulosin (FLOMAX) 0.4 MG capsule Take 1 capsule  "(0.4 mg) by mouth daily 90 capsule 3     vitamin D3 (CHOLECALCIFEROL) 2000 units (50 mcg) tablet Take 1 tablet by mouth every evening       Allergies   Allergen Reactions     Hydrochlorothiazide Other (See Comments)     Low Potassium     Lisinopril Swelling     Neck swelling/airway     Metoprolol Other (See Comments)     He was seen in the emergency room for lightheadedness after I increased his metoprolol. Patient stopped taking it-noted 6/18/2019.      Review of Systems:   20 point ROS otherwise negative    Physical Exam:  BP (!) 85/37 (BP Location: Right arm, Patient Position: Sitting, Cuff Size: Adult Regular)   Pulse 60   Temp 97.5  F (36.4  C) (Oral)   Ht 1.753 m (5' 9\")   Wt 65 kg (143 lb 6.4 oz)   BMI 21.18 kg/m      Constitutional- Older than stated age, chronically ill appearing  Eyes: Anicteric, no injection.  PERRL  ENT:  Normocephalic, atraumatic, Nose midline, moist mucus membranes  Neck - supple, no LAD  Respiratory- Clear to auscultation bilaterally, good inspiratory effort  Cardiovascular - Heart RRR, no lift's, thrills, murmurs, rubs, or gallop.    Neuro - No focal neuro deficits, Alert and oriented x 3  Psych: Appropriate mood and affect  Musculoskeletal: In wheelchair  Skin: Warm, Dry    LABS:     Lab Results   Component Value Date    WBC 8.3 10/14/2019    HGB 8.8 (L) 10/14/2019    HCT 26.9 (L) 10/14/2019    MCV 92 10/14/2019     10/14/2019      No results found for: INR       INFORMED CONSENT:     A discussion of the indications, risks, benefits and alternatives to surgery was held with the patient, and the risks discussed include beeding, infection, thrombosis, stenosis, port malfunction or malposition, air embolism, pneumothorax, hemothorax, or cardiac arrythmia . The patient understood the information given, questions addressed, and she wishes to proceed. She understands the need for maintenance of the infusion port at least once a month while in place.    ASSESSMENT AND PLAN: "     Mr. Andres Sow is in need of an infusion port for prostate cancer.  During visit, patient passed out requiring transfer to the ED.  Prior to this occurring, I had a lengthy discussion with the patient regarding chemotherapy and he is uncertain if he would like to go through with chemotherapy.  He is to have a discussion with oncology with his daughter present and should he decide to undergo chemotherapy, I will be happy to facilitate port placement.  Given his current condition, I feel this is less likely.  Alternatively, patient could have a PICC placed to see how he will fair with chemotherapy initially and port could be placed at a later date.  Patient to ED for evaluation.    Doni Pierre,  on 10/15/2019 at 6:33 AM

## 2019-10-15 NOTE — NURSING NOTE
"Initial BP (!) 85/37 (BP Location: Right arm, Patient Position: Sitting, Cuff Size: Adult Regular)   Pulse 60   Temp 97.5  F (36.4  C) (Oral)   Ht 1.753 m (5' 9\")   Wt 65 kg (143 lb 6.4 oz)   BMI 21.18 kg/m   Estimated body mass index is 21.18 kg/m  as calculated from the following:    Height as of this encounter: 1.753 m (5' 9\").    Weight as of this encounter: 65 kg (143 lb 6.4 oz). .    Tata Barahona MA    "

## 2019-10-15 NOTE — PROGRESS NOTES
[facility]  [place] Medical Record Number:  [id number]  Place of Service where encounter took place[facility] ([facility]) [[id number]]  Chief Complaint   Patient presents with     RECHECK       HPI:    Andres Sow  is a 85 year old (11/21/1933), who is being seen today for an episodic care visit.  HPI information obtained from: facility chart records, facility staff, patient report and [place] Epic chart review. Today's concern is:     Severe protein-calorie malnutrition (H)  Metastatic cancer (H)  Gross hematuria  Anemia due to blood loss, acute  Slow transit constipation  Essential hypertension   Patient with history of chronic Tamez catheter and gross hematuria over the past 3 months.  Recently diagnosed with multiple large lymph nodes, and prostate mass  recently hospitalized for syncopal episode due to significant anemia.  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       In TCU on 10/7  he was noted to have hemoglobin of 7.6, which was 1.5 g drop in 3 days -received 2 units of PRBC on 10/9.  Establish care with Dr. Aponte from oncology also on 10/9.  He recommended palliative chemotherapy at that time.    Patient reports she is feeling better since his blood transfusion.  Had significant gross hematuria over the weekend, hemoglobin checked and was    Past Medical and Surgical History reviewed in Epic today.    MEDICATIONS:  Current Outpatient Medications   Medication Sig Dispense Refill     acetaminophen (TYLENOL) 500 MG tablet Take 1,000 mg by mouth 3 times daily       allopurinol (ZYLOPRIM) 100 MG tablet Take 1 tablet (100 mg) by mouth daily 90 tablet 3     amLODIPine (NORVASC) 10 MG tablet Take 1 tablet (10 mg) by mouth daily 90 tablet 3     lidocaine (XYLOCAINE) 2 % external gel as needed for moderate pain Apply to Tip of penis topically as needed for pain Up to four times daily       LORazepam (ATIVAN) 0.5 MG tablet Take 1 tablet (0.5 mg)  "by mouth 2 times daily as needed for anxiety (Patient not taking: Reported on 10/9/2019) 20 tablet 0     losartan (COZAAR) 100 MG tablet Take 1 tablet (100 mg) by mouth daily 90 tablet 3     methylphenidate (RITALIN) 10 MG tablet Take 1 tablet (10 mg) by mouth 2 times daily 60 tablet 0     Multiple Vitamins-Minerals (PRESERVISION AREDS 2) CAPS Take 1 capsule by mouth 2 times daily        order for DME Equipment being ordered: Wheelchair    Dx:  Severe aortic stenosis, neoplastic fatigue 1 each 0     oxyCODONE (ROXICODONE) 5 MG tablet Take 1 tablet (5 mg) by mouth every 4 hours as needed (Patient not taking: Reported on 10/9/2019) 20 tablet 0     tamsulosin (FLOMAX) 0.4 MG capsule Take 1 capsule (0.4 mg) by mouth daily 90 capsule 3     vitamin D3 (CHOLECALCIFEROL) 2000 units (50 mcg) tablet Take 1 tablet by mouth every evening           REVIEW OF SYSTEMS:  4 point ROS including Respiratory, CV, GI and , other than that noted in the HPI,  is negative    Objective:  /49   Pulse 50   Temp 98.4  F (36.9  C)   Resp 20   Ht 1.753 m (5' 9\")   Wt 63.5 kg (140 lb)   SpO2 95%   BMI 20.67 kg/m    Exam:  GENERAL APPEARANCE:  Alert, in no distress, oriented, thin, cooperative  RESP:  respiratory effort and palpation of chest normal, lungs clear to auscultation , no respiratory distress  CV:  Palpation and auscultation of heart done , regular rate and rhythm, no murmur, rub, or gallop, no edema, +2 pedal pulses  ABDOMEN:  no guarding or rebound, bowel sounds normal  :    light catheter - slightly pink tinged yellow urine  M/S:   generalized weakness, no gross joint deformities  SKIN:  Inspection of skin and subcutaneous tissue baseline  NEURO:   Cranial nerves 2-12 are normal tested and grossly at patient's baseline  PSYCH:  oriented X 3, normal insight, judgement and memory, affect and mood normal    Labs:   Recent labs in [facility] reviewed by me today.  and   Most Recent 3 CBC's:  Recent Labs   Lab Test " 10/14/19  0731 10/12/19  1119 10/07/19  0734  10/04/19  0449 10/03/19  0531   WBC 8.3  --   --   --  9.5 9.1   HGB 8.8* 9.7* 7.6*   < > 9.0* 7.9*   MCV 92  --   --   --  90 90     --   --   --  198 187    < > = values in this interval not displayed.       ASSESSMENT/PLAN:  (C79.9) Metastatic cancer (H)  (primary encounter diagnosis)  Comment: New diagnosis, following with Dr Hebert with oncology.   Plan: Has apt for consult on port-a-cath on 10/15, plans to start chemo, but is hoping to complete therapy and discontinue from TCU first.     (E43) Severe protein-calorie malnutrition (H)  Comment: BMI 20.7, report weight loss, poor appetite.   Plan: Diet at tolerating, dietician following in TCU and managing supplement.     (R31.0) Gross hematuria  (D62) Anemia due to blood loss, acute  Comment: Received 2 units PRBC last week. Hgb stable - see labs. Bleeding significant decreased today. Had f/u with Dr Vega scheduled this week, but family cancelled apt.   Plan: Weekly CBC and PRN for s/s of anemia.     (K59.01) Slow transit constipation  Comment: Needed enema over the weekend, baseline is BM q3-4 days.   Plan: Will add Senna-s 1 tab at bedtime and miralax daily PRN. Encourage appropriate fluid intake. Continue to monitor and adjust.     (I10) Essential hypertension  Comment: 's-130's  Plan: Continue amlodipine 10mg daily, losartan 100mg daily. Continue to monitor - may need dose reduction if consistently <120 give age and overall frailty.     transcribed by : Sarah Rueda  Orders:  1. CBC every Monday - Dx: aneamia  2. Lotion to BLE BID  For itching , dry skin,  3. Senna - S (8.6/50mg) - 1 tab PO at bedtime - dx: constipation. Hold for loose stools  4. Miralax - 17 gram PO every day PRN - give if 0 BM >48 hrs - dx: constipation      Electronically signed by:  Ella Silva, ALEXX CNP     Disclaimer:  This note consists of symbols derived from keyboarding, dictation, and/or voice  recognition software.  As a result, there may be errors in the script that have gone undetected.  Please consider this when interpreting information found in the chart.

## 2019-10-16 NOTE — PLAN OF CARE
"  Problem: Adult Inpatient Plan of Care  Goal: Optimal Comfort and Wellbeing  10/16/2019 0505 by Sruthi Cruz, RN  Outcome: Improving     Problem: Syncope  Goal: Absence of Syncopal Symptoms  10/16/2019 0505 by Sruthi Cruz, RN  Outcome: Improving   Pt has denied feeling dizzy this shift and no witness episodes of syncope noted.  Vitals remain stable.  Pt remains bradycardic in the 40-50;sliding scale ( this is know for pt).    Tamez put out good amounts of urin that is dark red in color, but no clots.  Pt has denies any bladder spasm this shift.    Pt given melatonin to help sleep after reassessing pt an hour later pt was sleeping.  Pt asked this am if he felt he slept with the melatonin and pt reported, \"about an hour and half, I think.\"  As staff left room pt noted to be snoring again.  "

## 2019-10-16 NOTE — PROGRESS NOTES
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  Call placed for status check and to review Dr. Hebert's recommendations.    Primary care provider is: Eveline Shanks    Diagnosis:   Encounter Diagnoses   Name Primary?     Large cell neuroendocrine carcinoma (H) Yes     Malignant neoplasm metastatic to both lungs (H)      Oncology provider is:  Dr. SIL Chiang    How are you doing/feeling?: unknown.  Any further issues?: unknown  Next Follow up/Recommendations: Message left for patient and/or daughter Latrice to return call to clinic. Dr. Hebert would like to see patient in clinic post hospital to discuss plan of care and options. Direct line provided.    Patient instructed to call with any questions or concerns.  Patient states understanding and is in agreement with this plan.    Approximately 0 minutes spent on telephone with patient reviewing assessment and symptom(s) and providing symptom management.    Leslie Hein RN, BSN, OCN  Oncology Hematology   Breast Cancer Navigator  [facility]  [email or web address]  [phone]

## 2019-10-16 NOTE — DISCHARGE SUMMARY
[facility]  Hospitalist Discharge Summary       Date of Admission:  10/15/2019  Date of Discharge:  10/16/2019  Discharging Provider: Terri Rodriguez DO      Discharge Diagnoses   Principal Problem:    Syncope (9/30/2019)  Active Problems:    Hyperlipidemia LDL goal <100 (10/31/2010)    Bradycardia (7/27/2012)    Hypertension goal BP (blood pressure) < 150/90 (3/29/2017)    CKD (chronic kidney disease) stage 3, GFR 30-59 ml/min (H) (1/1/2012)    Aortic valve stenosis, severe (5/9/2019)    Anemia due to hematuria (9/30/2019)    History of gout (9/30/2019)    Anemia in neoplastic disease (10/7/2019)    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) (10/10/2019)    Loss of consciousness (H) (10/15/2019)        Follow-ups Needed After Discharge   --follow-up urine culture    Unresulted Labs Ordered in the Past 30 Days of this Admission     Date and Time Order Name Status Description    10/15/2019 2018 Urine Culture Aerobic Bacterial In process       These results will be followed up by TCU provider    Discharge Disposition   Discharged to short-term care facility  Condition at discharge: Stable    Hospital Course   Andres Sow is a 85 year old male admitted on 10/15/2019. He presents after a syncopal episode.    Syncope due to dehydration-   Syncopal episode lasting less than 30 seconds, occurred while seated.  He did experience a prodrome.  No associated chest pain, palpitations, or dyspnea.  No postictal period. Acknowledges feeling weak and lightheaded frequently over the last few weeks.   Recent admission for syncopal episode on 9/30/2019, found to be anemic at 6.8 and received 2 units PRBCs.  Today his hemoglobin is consistent with his baseline at 9.3.  He was hydrated with IV fluids overnight.  Telemetry was negative for arrhythmia.    Likely due to multiple factors including anemia from malignancy and chronic blood loss, dehydration from poor p.o. intake, and severe aortic  stenosis.  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 Biopsy identified as neuroendocrine. Per Dr. Vega, unlikely to be prostatic in origin. Follows with Dr. Hebert who has proposed palliative chemotherapy. He was in the clinic today to discuss port-a-cath placement when he experienced his syncopal episode.  -  discussion with patient and daughter Amanda.  They are unsure if chemotherapy is the right choice for him.  They are feeling that it is not likely an good option for him.  Encouraged patient and family to make a follow-up appointment with his oncologist to discuss further.    Anemia in neoplastic disease  Anemia due to hematuria  Chronic hematuria  Hgb 9.3  on admission consistent with recent baseline. Has been anemic since 5/2019. Likely due both active malignancy and chronic hematuria from prostate cancer.   - repeat CBC in 2 days    Hyperlipidemia   Noted on chart, not currently on statin.  - F/U outpatient    Bradycardia  Chronic, noted at least as far back as 2012. Runs as low as 40s without becoming symptomatic.    Hypertension  Normotensive since admission.  Managed with amlodipine 10 mg and losartan 100 mg.  - Continue amlodipine and losartan.    Acute on CKD stage 3 - resolved  Creatinine 1.74 on admission, up from his recent baseline of 1.20-1.44. Likely due to dehydration from poor PO intake.  - Recheck in in 2 days    Aortic valve stenosis, severe  Grade 3/6 systolic murmur, loudest at RSB. Echo 9/30/19, following previous syncopal episode, showed severe aortic stenosis, with a mean aortic gradient of 43 mmHg, and an aortic valve area of 0.7 cm .  While this is now his second episode of syncope, he has not experienced chest pain or symptoms concerning for HF. His aortic stenosis has increased  significantly since previous on 7/27/2012.  - Consider cardiology f/u, although patient seems to be leaning in the direction of focus on palliative care and may not be interested in pursuing aggressive management.    Gout  No active flares.   - Continue allopurinol.        Consultations This Hospital Stay   PALLIATIVE CARE ADULT IP CONSULT  CARE TRANSITION RN/SW IP CONSULT  PHYSICAL THERAPY ADULT IP CONSULT  OCCUPATIONAL THERAPY ADULT IP CONSULT    Code Status   DNR/DNI    Time Spent on this Encounter   I, Terri Rodriguez DO, personally saw the patient today and spent greater than 30 minutes discharging this patient.       Terri Rodriguez DO  [facility]  ______________________________________________________________________    Physical Exam   Vital Signs: Temp: 98.8  F (37.1  C) Temp src: Oral BP: (!) 150/48 Pulse: 51 Heart Rate: 54 Resp: 16 SpO2: 94 % O2 Device: None (Room air)    Weight: 143 lbs 0 oz  Constitutional: awake, alert, cooperative and appears older than stated age  ENT: MMM  Respiratory: No increased work of breathing, good air exchange, clear to auscultation bilaterally, no crackles or wheezing  Cardiovascular: RRR, loud harsh holosystolic murmur heard throughout the precordium  GI: normal bowel sounds, non-distended and non-tender  Neurologic: Awake, alert, oriented to name, place and time.  Cranial nerves II-XII are grossly intact.  Motor is 4 out of 5 bilaterally.  l.       Primary Care Physician   Eveline Shanks    Discharge Orders      General info for SNF    Length of Stay Estimate: Short Term Care: Estimated # of Days <30  Condition at Discharge: Improving  Level of care:skilled   Rehabilitation Potential: Good  Admission H&P remains valid and up-to-date: Yes  Recent Chemotherapy: N/A  Use Nursing Home Standing Orders: Yes     Reason for your hospital stay    Syncope due to dehydration.  You were given IV fluids with improvement in dizziness.  No  changes to regular medications were made     Tamez catheter    To straight gravity drainage. Change catheter every 2 weeks and PRN for leaking or decreased uring output with signs of bladder distention. DO NOT change catheter without a specific MD order IF diagnosis of benign prostatic hypertrophy (BPH), neurogenic bladder, or other urological conditions     Activity - Up with assistive device     Follow Up and recommended labs and tests    Follow up with [facility] physician.  The following labs/tests are recommended: BMP and CBC.     Physical Therapy Adult Consult    Evaluate and treat as clinically indicated.    Reason:  Weakness     Occupational Therapy Adult Consult    Evaluate and treat as clinically indicated.    Reason:  weakness     Advance Diet as Tolerated    Follow this diet upon discharge: Orders Placed This Encounter      Regular Diet Adult       Significant Results and Procedures   Most Recent 3 CBC's:  Recent Labs   Lab Test 10/16/19  0524 10/15/19  1651 10/15/19  1022 10/14/19  0731   WBC 8.4  --  8.6 8.3   HGB 7.8* 8.7* 9.3* 8.8*   MCV 92  --  92 92     --  245 221     Most Recent 3 BMP's:  Recent Labs   Lab Test 10/16/19  0524 10/15/19  1022 10/04/19  0449    134 135   POTASSIUM 4.7 4.5 4.8   CHLORIDE 104 101 103   CO2 27 26 24   BUN 46* 60* 40*   CR 1.29* 1.74* 1.36*   ANIONGAP 6 7 8   NUVIA 9.1 9.7 8.4*   GLC 81 107* 92   ,   Results for orders placed or performed during the hospital encounter of 09/30/19   CT Chest Pulmonary Embolism w Contrast    Narrative    CT CHEST WITH CONTRAST  9/30/2019 2:05 PM    HISTORY: Evaluate pulmonary nodules seen on a recent CT of the abdomen  and pelvis.     COMPARISON: A CT of the abdomen and pelvis on 9/26/2019 which included  the lower chest.    TECHNIQUE: Routine transverse CT imaging of the chest was performed  following the uneventful intravenous administration of 68 mL Isovue  370 contrast. Radiation dose for this scan was reduced using  automated  exposure control, adjustment of the mA and/or kV according to patient  size, or iterative reconstruction technique.    FINDINGS: The heart size is normal.No enlarged lymph node or other  abnormal mediastinal mass is seen. There is calcification within the  thoracic aorta. The vascular structures are otherwise unremarkable.  There are innumerable nodules seen diffusely throughout both lungs.  The largest is a collection of nodules in the left midlung measuring  up to 2.4 cm demonstrated on series 4 image 68. On the right there is  a 1.7 cm long nodule in the right infrahilar region on series 4 image  74. There is an additional 2.7 cm long mass just to the right of the  heart border on series 4 image 116. There are numerous smaller nodular  densities seen diffusely throughout both lungs. The lungs are  otherwise clear with no consolidation or infiltrate seen. No  pneumothorax is demonstrated. No pleural effusion is identified. There  are degenerative changes in the spine. The coronal series 6 image 94  suggests a 2.3 cm area of increased density in the right aspect of the  T12 vertebral body. This is not calcified. No other osseous  abnormality is noted. No chest wall pathology is seen. The visualized  upper abdomen is unremarkable.      Impression    IMPRESSION:   1. There are innumerable bilateral pulmonary nodules as described  above. Metastatic disease needs to be considered.  2. There is a 2.3 cm area of increased attenuation within the right  aspect of the T12 vertebral body. This is a nonspecific finding but  metastatic involvement needs to be considered.    KRISTINE JARVIS MD       Discharge Medications   Current Discharge Medication List      CONTINUE these medications which have CHANGED    Details   methylphenidate (RITALIN) 10 MG tablet Take 1 tablet (10 mg) by mouth 2 times daily  Qty: 10 tablet, Refills: 0    Associated Diagnoses: Neoplastic (malignant) related fatigue      oxyCODONE  (ROXICODONE) 5 MG tablet Take 1 tablet (5 mg) by mouth every 4 hours as needed for moderate to severe pain  Qty: 10 tablet, Refills: 0    Associated Diagnoses: Suspected malignant neoplasm         CONTINUE these medications which have NOT CHANGED    Details   acetaminophen (TYLENOL) 500 MG tablet Take 1,000 mg by mouth 3 times daily      allopurinol (ZYLOPRIM) 100 MG tablet Take 1 tablet (100 mg) by mouth daily  Qty: 90 tablet, Refills: 3    Associated Diagnoses: Gout, unspecified cause, unspecified chronicity, unspecified site      amLODIPine (NORVASC) 10 MG tablet Take 1 tablet (10 mg) by mouth daily  Qty: 90 tablet, Refills: 3    Associated Diagnoses: Essential hypertension, benign      lidocaine (XYLOCAINE) 2 % external gel Apply topically 4 times daily as needed for moderate pain Apply to Tip of penis topically as needed for pain Up to four times daily       losartan (COZAAR) 100 MG tablet Take 1 tablet (100 mg) by mouth daily  Qty: 90 tablet, Refills: 3    Associated Diagnoses: Essential hypertension, benign      Multiple Vitamins-Minerals (PRESERVISION AREDS PO) Take 1 tablet by mouth 2 times daily      Nutritional Supplements (ENSURE PLUS) LIQD Take 6 oz by mouth 3 times daily Between meals      senna-docusate (SENOKOT-S/PERICOLACE) 8.6-50 MG tablet Take 1 tablet by mouth At Bedtime      tamsulosin (FLOMAX) 0.4 MG capsule Take 1 capsule (0.4 mg) by mouth daily  Qty: 90 capsule, Refills: 3      vitamin D3 (CHOLECALCIFEROL) 2000 units (50 mcg) tablet Take 1 tablet by mouth every evening      LORazepam (ATIVAN) 0.5 MG tablet Take 1 tablet (0.5 mg) by mouth 2 times daily as needed for anxiety  Qty: 20 tablet, Refills: 0    Associated Diagnoses: Anxiety      melatonin 3 MG tablet Take 3 mg by mouth nightly as needed for sleep      order for DME Equipment being ordered: Wheelchair    Dx:  Severe aortic stenosis, neoplastic fatigue  Qty: 1 each, Refills: 0    Associated Diagnoses: Aortic valve stenosis, etiology of  cardiac valve disease unspecified; Neoplastic (malignant) related fatigue      polyethylene glycol (MIRALAX/GLYCOLAX) packet Take 1 packet by mouth daily as needed for constipation           Allergies   Allergies   Allergen Reactions     Hydrochlorothiazide Other (See Comments)     Low Potassium     Lisinopril Swelling     Neck swelling/airway     Metoprolol Other (See Comments)     He was seen in the emergency room for lightheadedness after I increased his metoprolol. Patient stopped taking it-noted 6/18/2019.

## 2019-10-16 NOTE — PROGRESS NOTES
Dr Noyola aware of UA results in epic. No new orders. Baseline UA results for comparison if further UA s/sx should arise.

## 2019-10-16 NOTE — PROGRESS NOTES
complains of some full feeling in the bladder and very uncomfortable.    Some sediment in the bag but the bladder was empty on scanning   Suspect some bladder spasms  May be infection but no other symptoms. (fever,leukocytosis)    Will check UA/UC but it will be positive.    Try some B&O supp.  IF ongoing may consider abx and follow culture for choice of abx.

## 2019-10-16 NOTE — CONSULTS
Name: Andres Sow    MRN#: [id number]    Reason for Hospitalization: Syncope and collapse [R55]  Dehydration [E86.0]  Gross hematuria [R31.0]  Anemia due to blood loss, acute [D62]    Discharge Date: 10/16/2019    Patient / Family response to discharge plan: Met with patient, spoke with Martha forde via phone.  Patient is currently residing at [facility] (Phone: [phone] Fax: [phone]) U.  He is ready to return there today.  DaughterOrly ([phone]) plans to transport patient at 1600 today.  Patient in agreement with plan.      Other Providers (Care Coordinator, County Services, PCA services etc): No    CTS Hand Off Completed: not needed    PAS #: NA    GARTH Score: average    Future Appointments: No future appointments.    Discharge Disposition: transitional care unit    Discharge Planner   Discharge Plans in progress: completed: return to TCU  Barriers to discharge plan: none  Follow up plan: TCU for therapies/oncology       Entered by: Chanel Good 10/16/2019 10:30 AM

## 2019-10-16 NOTE — PLAN OF CARE
[facility] [facility] [place]    Patient discharged to transitional care unit at 4:00 PM via wheel chair. Accompanied by daughter and staff. Discharge instructions reviewed with patient and daughter, opportunity offered to ask questions. Prescriptions - None ordered for discharge. All belongings sent with patient.    Jennifer Adam, RN      Writer informed that Vanessa, the Pt previous RN had been playing phone tag with Sangeetha at [facility] to give report. Writer also played phone tag with Sangeetha and did not get the chance to give report before the patient discharged.

## 2019-10-17 NOTE — PROGRESS NOTES
Clinic Care Coordination Contact  Care Coordination Transition Communication     Clinical Data: Patient was hospitalized at [facility] from 10/15/19 to 10/16/19 with diagnosis of syncope. Anemia with hemoglobin of 6.8     Transition to Facility:              Facility Name: [facility] [place]              Contact name and phone number/fax: Milagro BOWLES [phone]    Patient is to follow up with Dr. Hebert, oncologist, to discuss future treatment plans    Plan: RN/SW Care Coordinator will await notification from facility staff informing RN/SW Care Coordinator of patient's discharge plans/needs. RN/SW Care Coordinator will review chart and outreach to facility staff every 4 weeks and as needed.     Namita Parr RN, CCM - Primary Care Clinic RN Coordinator  [facility]   10/17/2019    8:06 AM  [phone]

## 2019-10-17 NOTE — PROGRESS NOTES
"[facility]  [place] Medical Record Number:  [id number]  Place of Service where encounter took place:  [facility] ([facility]) [[id number]]  Chief Complaint   Patient presents with     Hospital F/U       HPI:    Andres Sow  is a 85 year old (11/21/1933), who is being seen today for an episodic care visit.  HPI information obtained from: facility chart records, facility staff, patient report and [place] Epic chart review.       Today's concern is:  Andres Sow is an 85-year-old gentleman with past medical history of HTN, HLD, CKD stage III, chronic indwelling Tamez catheter, who was admitted after syncopal episode at home.  He had been having hematuria for 3 to 4 months, with reports of worsening fatigue over the past few weeks.  Abdominal CT on 9/26 showed evidence of possible metastatic cancer.  On that same date he was also diagnosed with a UTI and treated with a course of ampicillin.  Hemoglobin in ED was 6.8.  Received 2 units of packed cells, hemoglobin improved to 8.1,, dropped again to 7.1 is transfused an additional 1 unit PRBC.  Underwent prostate biopsy on 10/1, pathology revealed high-grade carcinoma, favoring large cell neuroendocrine carcinoma.  Urology Dr. Vega felt prostate origin unlikely.  Echo on 10/1 showed severe aortic stenosis.  No previous history of heart failure.  Reports he had been diagnosed with a \"murmur\" during physical earlier this year.  Urine culture while inpatient had no growth.  Completed ampicillin on 10/4/2019.  Blood pressure stable.  Was seen by palliative care, started on methylphenidate for fatigue.  Discussed CODE STATUS, opted to remain full code, however was considering changing to DNR.  When medically stable was discharged to TCU for further rehab and medical management.    On 10/7 had Hgb on 7.6 in TCU, which was a 1.5 drop in Hgb in 2 days. He received 2 units PRN on 10/9.     On 10/9 He met with oncology Dr Hebert to discuss " possible palliative chemo therapy.     On 10/15 he met with surgery to discuss possible placement of [id number]. Unfortunately, he had syncopal episode at the office and was sent to the ED and was admitted overnight into observation. Hgb was 9.3 on admission, and syncope was felt to be due to dehydration. He was given IVF, telemetry negative for arrhythmia, and was discharged back to TCU on 10/16. Hgb at that time was 7.6.     Today in TCU he reports he is very tired. Does have some lightheadedness when standing, but does not feel like he is going to pass out, no chest pain, palpitations, or SOB. He Reports some low back pain. He did not sleep well last night d/t low back pain and discomfort with his catheter. Oxycodone has helped with his back pain.  He had trace blood in urine on 10/14, but today there is significantly blood noted in his light catheter. He reports developed bladder pain last night and urine flow decreased. Nurses were able to mild tubing and urine started flowing again and bladder pain improved.     He continues to report a poor appetite. Has been trying to drink more fluids. Nursing staff held BP medications this AM as SBP was 110's and there were concerned about his significant hematuria. He was able to work this therapy, but does continue to feel very fatigued.    Hgb drawn today and returned with critical value of 6.7.        Syncope, unspecified syncope type  Anemia in neoplastic disease  Anemia due to blood loss, acute  Gross hematuria  Chronic indwelling Light catheter  Metastatic cancer (H)  Slow transit constipation  Essential hypertension  Aortic valve stenosis, etiology of cardiac valve disease unspecified  Severe protein-calorie malnutrition (H)  Anxiety  Neoplastic malignant related fatigue    Past Medical and Surgical History reviewed in Epic today.    MEDICATIONS:  Current Outpatient Medications   Medication Sig Dispense Refill     acetaminophen (TYLENOL) 500 MG tablet Take 1,000  "mg by mouth 3 times daily       allopurinol (ZYLOPRIM) 100 MG tablet Take 1 tablet (100 mg) by mouth daily 90 tablet 3     amLODIPine (NORVASC) 10 MG tablet Take 1 tablet (10 mg) by mouth daily 90 tablet 3     lidocaine (XYLOCAINE) 2 % external gel Apply topically 4 times daily as needed for moderate pain Apply to Tip of penis topically as needed for pain Up to four times daily        LORazepam (ATIVAN) 0.5 MG tablet Take 1 tablet (0.5 mg) by mouth 2 times daily as needed for anxiety 10 tablet 0     losartan (COZAAR) 100 MG tablet Take 1 tablet (100 mg) by mouth daily 90 tablet 3     melatonin 3 MG tablet Take 3 mg by mouth nightly as needed for sleep       methylphenidate (RITALIN) 10 MG tablet Take 1 tablet (10 mg) by mouth 2 times daily 10 tablet 0     Multiple Vitamins-Minerals (PRESERVISION AREDS PO) Take 1 tablet by mouth 2 times daily       Nutritional Supplements (ENSURE PLUS) LIQD Take 6 oz by mouth 3 times daily Between meals       order for DME Equipment being ordered: Wheelchair    Dx:  Severe aortic stenosis, neoplastic fatigue 1 each 0     oxyCODONE (ROXICODONE) 5 MG tablet Take 1 tablet (5 mg) by mouth every 4 hours as needed for moderate to severe pain 10 tablet 0     polyethylene glycol (MIRALAX/GLYCOLAX) packet Take 1 packet by mouth daily as needed for constipation       senna-docusate (SENOKOT-S/PERICOLACE) 8.6-50 MG tablet Take 1 tablet by mouth At Bedtime       tamsulosin (FLOMAX) 0.4 MG capsule Take 1 capsule (0.4 mg) by mouth daily 90 capsule 3     vitamin D3 (CHOLECALCIFEROL) 2000 units (50 mcg) tablet Take 1 tablet by mouth every evening           REVIEW OF SYSTEMS:  10 point ROS of systems including Constitutional, Eyes, Respiratory, Cardiovascular, Gastroenterology, Genitourinary, Integumentary, Musculoskeletal, Psychiatric were all negative except for pertinent positives noted in my HPI.    Objective:  /49   Pulse 54   Temp 98.7  F (37.1  C)   Resp 12   Ht 1.753 m (5' 9\")   Wt " 63.5 kg (140 lb)   SpO2 92%   BMI 20.67 kg/m    Exam:  GENERAL APPEARANCE:  Alert, in no distress, thin, cooperative, frail, pale  RESP:  respiratory effort and palpation of chest normal, lungs clear to auscultation , no respiratory distress  CV:  Palpation and auscultation of heart done , regular rate and rhythm, no murmur, rub, or gallop  ABDOMEN:  no guarding or rebound, bowel sounds normal, soft, non-tender  :    signficantly blood noted noted in urine, catheter drainaing without issue  M/S:   generalized weakness, no gross joint deformities  SKIN:  Inspection of skin and subcutaneous tissue baseline  NEURO:   Cranial nerves 2-12 are normal tested and grossly at patient's baseline  PSYCH:  oriented X 3, normal insight, judgement and memory, affect and mood normal    Labs:   Recent labs in [facility] reviewed by me today.  and   Most Recent 3 CBC's:  Recent Labs   Lab Test 10/18/19  1105 10/16/19  0524 10/15/19  1651 10/15/19  1022 10/14/19  0731   WBC  --  8.4  --  8.6 8.3   HGB Canceled, Test credited  6.7* 7.8* 8.7* 9.3* 8.8*   MCV  --  92  --  92 92   PLT  --  210  --  245 221     Most Recent 3 BMP's:  Recent Labs   Lab Test 10/16/19  0524 10/15/19  1022 10/04/19  0449    134 135   POTASSIUM 4.7 4.5 4.8   CHLORIDE 104 101 103   CO2 27 26 24   BUN 46* 60* 40*   CR 1.29* 1.74* 1.36*   ANIONGAP 6 7 8   NUVIA 9.1 9.7 8.4*   GLC 81 107* 92       ASSESSMENT/PLAN:  (R55) Syncope, unspecified syncope type  (primary encounter diagnosis)  Comment: Second episode of syncope in 2 week. Felt this time to be d/t dehydration in the setting of poor oral intake.   Plan: Encourage adequate fluid intake - discussed with patient drinking at least 2 L of fluid daily. Dietician to follow.     (D63.0) Anemia in neoplastic disease  (D62) Anemia due to blood loss, acute  (R31.0) Gross hematuria  Comment: Hgb dropped 9.3->8.7->7.8-> 6.7. Did receive IVF during recent hospitalization so could be somewhat dilutional, but does have  significant hematuria noted on exam today. He is pale, very fatigued. Previous h/o of syncope with Hgb of 6.9.  Plan: Given active bleeding, critical Hgb and upcoming weekend, will send patient into ED for transfusion as will not be able to accommodate in outpatient transfusion center and Hgb likely to continue to trend down. Discussed this with patient, who was agreeable to plan. Requesting daughter to transport.     (Z96.0) Chronic indwelling Tamez catheter  Comment: Possible bladder spasms last night, bladder discomfort with catheter repositioned and flow restored. UC done 10/15 grew 10-50K e.coli  Plan: Continue tamsulosin 0.4mg at bedtime.  Discussed with patient - staff to educate patient on catheter cares. Will not prescribe oxybutynin given side effect panel and patient already having episodes of syncope. Follow-up with Dr Vega as scheduled. Give culture results, do not feel need abx at this time, but will defer for now as he is being sent to ED.     (C79.9) Metastatic cancer (H)  Comment: Found to have a pelvic mass. Prostate biopsy consistent with a high grade cancer, likely neuroendocrine. However, per urology Dr Vega prostate unlikely to be primary source. Imaging with multiple pulmonary nodules and a T12 vertebral body lesion - all suspicious for metastasis. Met with oncology on 10/9 to discuss possible palliative chemo. As of discussion today, he is uncertain if he wants to pursue this as he does not think he will tolerate the chemo  Plan: F/u with Dr Hebert as scheduled on 10/23. Continue oxycodone 5mg PRN for low back pain.     (K59.01) Slow transit constipation  Comment: Report a Bm yesterday, has BM every 3-4 days at baseline. Did require an enema last week.   Plan: Continue senna-s 1 tab at bedtime, continue to monitor.     (I10) Essential hypertension  Comment: 's-150's in TCU,   Plan: Continue losartan 100mg daily, amlodipine 10mg daily - will not change as am sending to ED, but will  consider reducing losartan to 50mg daily and amlodipine to 5mg at bedtime to reduce risk of hypotension in setting of severe aortic stenosis and gross hematuria.     (I35.0) Aortic valve stenosis, severe  Comment: New dx on Echo 9/30.   Plan: Consider f/u with cardiology after TCU discharge pending patient's goals of care.     (E43) Severe protein-calorie malnutrition (H)  Comment: BMI 20.8, significant weight loss in the past few months. Poor appetite  Plan: Dietician to follow and manage supplements in TCU.     (F41.9) Anxiety  Comment: Mood stable  Plan: Will continue PRN ativan x6 months given recent diagnosis and changes in health.     (R53.0) Neoplastic malignant related fatigue  Comment: seen by palliative care sure 9/30 hospitalization  Plan: Continue methylphenidate 10mg BID.     transcribed by : Sarah Rueda  Orders:  1. Hgb on 10/21 - dx: anemia  2. Continuous Lorazepam x 6 months  3. Send to ED if Hgb 6+ w/ active bleeding; light headedness    Total time spent with patient visit at the skilled nursing facility was 40 including patient visit and review of past records. Greater than 50% of total time spent with counseling and coordinating care due to 25 minutes with multiple patient visits discussing labs results. continued bleeding, need for emeergent blood transfuins, review of current medications, discussion of plan of care and patient goals with patient, care coordination with nursing re: blood transfusion and labs results, vital signs.  Electronically signed by:  ALEXX Morris CNP

## 2019-10-17 NOTE — TELEPHONE ENCOUNTER
Pt was admitted into the hosp this week and discharged to [facility] yesterday.  Will await to hear from pt or family if exchange will be done here or NH.    Lilly MADRID CMA

## 2019-10-18 NOTE — ED NOTES
States he lives at the [facility] and they told him his hemoglobin is low. Family states he has had 4 transfusions in the last 2 weeks

## 2019-10-18 NOTE — TELEPHONE ENCOUNTER
Pt daughter Martha calling to get pt in for a cath change either here in clinic or NH    Please call [phone]    Aditi You  Specialty CSS

## 2019-10-18 NOTE — ED NOTES
Report called to April, pt will be going to [id number]. Dinner tray was ordered for pt but he declined it. Oxycodone given for neck and hip/pelvis pain.

## 2019-10-18 NOTE — LETTER
"    10/18/2019        RE: Andres Sow  [address]  [place] [address]        [facility]  [place] Medical Record Number:  [id number]  [facility] where encounter took place[facility] [[id number]]  Chief Complaint   Patient presents with     Hospital F/U       HPI:    Andres Sow  is a 85 year old (11/21/1933), who is being seen today for an episodic care visit.  HPI information obtained from: facility chart records, facility staff, patient report and [place] Epic chart review.       Today's concern is:  Andres Sow is an 85-year-old gentleman with past medical history of HTN, HLD, CKD stage III, chronic indwelling Tamez catheter, who was admitted after syncopal episode at home.  He had been having hematuria for 3 to 4 months, with reports of worsening fatigue over the past few weeks.  Abdominal CT on 9/26 showed evidence of possible metastatic cancer.  On that same date he was also diagnosed with a UTI and treated with a course of ampicillin.  Hemoglobin in ED was 6.8.  Received 2 units of packed cells, hemoglobin improved to 8.1,, dropped again to 7.1 is transfused an additional 1 unit PRBC.  Underwent prostate biopsy on 10/1, pathology revealed high-grade carcinoma, favoring large cell neuroendocrine carcinoma.  Urology Dr. Vega felt prostate origin unlikely.  Echo on 10/1 showed severe aortic stenosis.  No previous history of heart failure.  Reports he had been diagnosed with a \"murmur\" during physical earlier this year.  Urine culture while inpatient had no growth.  Completed ampicillin on 10/4/2019.  Blood pressure stable.  Was seen by palliative care, started on methylphenidate for fatigue.  Discussed CODE STATUS, opted to remain full code, however was considering changing to DNR.  When medically stable was discharged to TCU for further rehab and medical management.    On 10/7 had Hgb on 7.6 in TCU, which was a 1.5 drop in Hgb in 2 " days. He received 2 units PRN on 10/9.     On 10/9 He met with oncology Dr Hebert to discuss possible palliative chemo therapy.     On 10/15 he met with surgery to discuss possible placement of [id number]. Unfortunately, he had syncopal episode at the office and was sent to the ED and was admitted overnight into observation. Hgb was 9.3 on admission, and syncope was felt to be due to dehydration. He was given IVF, telemetry negative for arrhythmia, and was discharged back to TCU on 10/16. Hgb at that time was 7.6.     Today in TCU he reports he is very tired. Does have some lightheadedness when standing, but does not feel like he is going to pass out, no chest pain, palpitations, or SOB. He Reports some low back pain. He did not sleep well last night d/t low back pain and discomfort with his catheter. Oxycodone has helped with his back pain.  He had trace blood in urine on 10/14, but today there is significantly blood noted in his light catheter. He reports developed bladder pain last night and urine flow decreased. Nurses were able to mild tubing and urine started flowing again and bladder pain improved.     He continues to report a poor appetite. Has been trying to drink more fluids. Nursing staff held BP medications this AM as SBP was 110's and there were concerned about his significant hematuria. He was able to work this therapy, but does continue to feel very fatigued.    Hgb drawn today and returned with critical value of 6.7.        Syncope, unspecified syncope type  Anemia in neoplastic disease  Anemia due to blood loss, acute  Gross hematuria  Chronic indwelling Light catheter  Metastatic cancer (H)  Slow transit constipation  Essential hypertension  Aortic valve stenosis, etiology of cardiac valve disease unspecified  Severe protein-calorie malnutrition (H)  Anxiety  Neoplastic malignant related fatigue    Past Medical and Surgical History reviewed in Epic today.    MEDICATIONS:  Current Outpatient  Medications   Medication Sig Dispense Refill     acetaminophen (TYLENOL) 500 MG tablet Take 1,000 mg by mouth 3 times daily       allopurinol (ZYLOPRIM) 100 MG tablet Take 1 tablet (100 mg) by mouth daily 90 tablet 3     amLODIPine (NORVASC) 10 MG tablet Take 1 tablet (10 mg) by mouth daily 90 tablet 3     lidocaine (XYLOCAINE) 2 % external gel Apply topically 4 times daily as needed for moderate pain Apply to Tip of penis topically as needed for pain Up to four times daily        LORazepam (ATIVAN) 0.5 MG tablet Take 1 tablet (0.5 mg) by mouth 2 times daily as needed for anxiety 10 tablet 0     losartan (COZAAR) 100 MG tablet Take 1 tablet (100 mg) by mouth daily 90 tablet 3     melatonin 3 MG tablet Take 3 mg by mouth nightly as needed for sleep       methylphenidate (RITALIN) 10 MG tablet Take 1 tablet (10 mg) by mouth 2 times daily 10 tablet 0     Multiple Vitamins-Minerals (PRESERVISION AREDS PO) Take 1 tablet by mouth 2 times daily       Nutritional Supplements (ENSURE PLUS) LIQD Take 6 oz by mouth 3 times daily Between meals       order for DME Equipment being ordered: Wheelchair    Dx:  Severe aortic stenosis, neoplastic fatigue 1 each 0     oxyCODONE (ROXICODONE) 5 MG tablet Take 1 tablet (5 mg) by mouth every 4 hours as needed for moderate to severe pain 10 tablet 0     polyethylene glycol (MIRALAX/GLYCOLAX) packet Take 1 packet by mouth daily as needed for constipation       senna-docusate (SENOKOT-S/PERICOLACE) 8.6-50 MG tablet Take 1 tablet by mouth At Bedtime       tamsulosin (FLOMAX) 0.4 MG capsule Take 1 capsule (0.4 mg) by mouth daily 90 capsule 3     vitamin D3 (CHOLECALCIFEROL) 2000 units (50 mcg) tablet Take 1 tablet by mouth every evening           REVIEW OF SYSTEMS:  10 point ROS of systems including Constitutional, Eyes, Respiratory, Cardiovascular, Gastroenterology, Genitourinary, Integumentary, Musculoskeletal, Psychiatric were all negative except for pertinent positives noted in my  "HPI.    Objective:  /49   Pulse 54   Temp 98.7  F (37.1  C)   Resp 12   Ht 1.753 m (5' 9\")   Wt 63.5 kg (140 lb)   SpO2 92%   BMI 20.67 kg/m     Exam:  GENERAL APPEARANCE:  Alert, in no distress, thin, cooperative, frail, pale  RESP:  respiratory effort and palpation of chest normal, lungs clear to auscultation , no respiratory distress  CV:  Palpation and auscultation of heart done , regular rate and rhythm, no murmur, rub, or gallop  ABDOMEN:  no guarding or rebound, bowel sounds normal, soft, non-tender  :    signficantly blood noted noted in urine, catheter drainaing without issue  M/S:   generalized weakness, no gross joint deformities  SKIN:  Inspection of skin and subcutaneous tissue baseline  NEURO:   Cranial nerves 2-12 are normal tested and grossly at patient's baseline  PSYCH:  oriented X 3, normal insight, judgement and memory, affect and mood normal    Labs:   Recent labs in [facility] reviewed by me today.  and   Most Recent 3 CBC's:  Recent Labs   Lab Test 10/18/19  1105 10/16/19  0524 10/15/19  1651 10/15/19  1022 10/14/19  0731   WBC  --  8.4  --  8.6 8.3   HGB Canceled, Test credited  6.7* 7.8* 8.7* 9.3* 8.8*   MCV  --  92  --  92 92   PLT  --  210  --  245 221     Most Recent 3 BMP's:  Recent Labs   Lab Test 10/16/19  0524 10/15/19  1022 10/04/19  0449    134 135   POTASSIUM 4.7 4.5 4.8   CHLORIDE 104 101 103   CO2 27 26 24   BUN 46* 60* 40*   CR 1.29* 1.74* 1.36*   ANIONGAP 6 7 8   NUVIA 9.1 9.7 8.4*   GLC 81 107* 92       ASSESSMENT/PLAN:  (R55) Syncope, unspecified syncope type  (primary encounter diagnosis)  Comment: Second episode of syncope in 2 week. Felt this time to be d/t dehydration in the setting of poor oral intake.   Plan: Encourage adequate fluid intake - discussed with patient drinking at least 2 L of fluid daily. Dietician to follow.     (D63.0) Anemia in neoplastic disease  (D62) Anemia due to blood loss, acute  (R31.0) Gross hematuria  Comment: Hgb dropped " 9.3->8.7->7.8-> 6.7. Did receive IVF during recent hospitalization so could be somewhat dilutional, but does have significant hematuria noted on exam today. He is pale, very fatigued. Previous h/o of syncope with Hgb of 6.9.  Plan: Given active bleeding, critical Hgb and upcoming weekend, will send patient into ED for transfusion as will not be able to accommodate in outpatient transfusion center and Hgb likely to continue to trend down. Discussed this with patient, who was agreeable to plan. Requesting daughter to transport.     (Z96.0) Chronic indwelling Tamez catheter  Comment: Possible bladder spasms last night, bladder discomfort with catheter repositioned and flow restored. UC done 10/15 grew 10-50K e.coli  Plan: Continue tamsulosin 0.4mg at bedtime.  Discussed with patient - staff to educate patient on catheter cares. Will not prescribe oxybutynin given side effect panel and patient already having episodes of syncope. Follow-up with Dr Vega as scheduled. Give culture results, do not feel need abx at this time, but will defer for now as he is being sent to ED.     (C79.9) Metastatic cancer (H)  Comment: Found to have a pelvic mass. Prostate biopsy consistent with a high grade cancer, likely neuroendocrine. However, per urology Dr Vega prostate unlikely to be primary source. Imaging with multiple pulmonary nodules and a T12 vertebral body lesion - all suspicious for metastasis. Met with oncology on 10/9 to discuss possible palliative chemo. As of discussion today, he is uncertain if he wants to pursue this as he does not think he will tolerate the chemo  Plan: F/u with Dr Hebert as scheduled on 10/23. Continue oxycodone 5mg PRN for low back pain.     (K59.01) Slow transit constipation  Comment: Report a Bm yesterday, has BM every 3-4 days at baseline. Did require an enema last week.   Plan: Continue senna-s 1 tab at bedtime, continue to monitor.     (I10) Essential hypertension  Comment: 's-150's in  TCU,   Plan: Continue losartan 100mg daily, amlodipine 10mg daily - will not change as am sending to ED, but will consider reducing losartan to 50mg daily and amlodipine to 5mg at bedtime to reduce risk of hypotension in setting of severe aortic stenosis and gross hematuria.     (I35.0) Aortic valve stenosis, severe  Comment: New dx on Echo 9/30.   Plan: Consider f/u with cardiology after TCU discharge pending patient's goals of care.     (E43) Severe protein-calorie malnutrition (H)  Comment: BMI 20.8, significant weight loss in the past few months. Poor appetite  Plan: Dietician to follow and manage supplements in TCU.     (F41.9) Anxiety  Comment: Mood stable  Plan: Will continue PRN ativan x6 months given recent diagnosis and changes in health.     (R53.0) Neoplastic malignant related fatigue  Comment: seen by palliative care sure 9/30 hospitalization  Plan: Continue methylphenidate 10mg BID.     transcribed by : Sarah Rueda  Orders:  1. Hgb on 10/21 - dx: anemia  2. Continuous Lorazepam x 6 months  3. Send to ED if Hgb 6+ w/ active bleeding; light headedness    Total time spent with patient visit at the skilled nursing facility was 40 including patient visit and review of past records. Greater than 50% of total time spent with counseling and coordinating care due to 25 minutes with multiple patient visits discussing labs results. continued bleeding, need for emeergent blood transfuins, review of current medications, discussion of plan of care and patient goals with patient, care coordination with nursing re: blood transfusion and labs results, vital signs.  Electronically signed by:  ALEXX Morris CNP               Sincerely,        ALEXX Morris CNP

## 2019-10-18 NOTE — ED PROVIDER NOTES
History     Chief Complaint   Patient presents with     Anemia     Pt coming in for low hgb (6.7) and wanting a blood transfusion. Infusion services is not available to take the pt and they're coming in wanting pt transfused today     Generalized Weakness     HPI    Andres Sow is a 85 year old male who presents for blood transfusion.  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  He has had chronic blood loss from his urinary tract and has required 4 transfusions in the last 2 weeks because of this.  He had 2 syncopal episodes.  He is currently at the [facility].  His hemoglobin today there was 6.7 and he was sent to infusion therapy but they could not provide his transfusion.  They sent him here instead.  He has met with Dr. Hebert in oncology to discuss treatment for his widely metastatic prostate cancer and is considering palliative chemotherapy but currently is disinclined to proceed with this more inclined toward hospice care.  He also has known severe aortic stenosis and is due to meet with cardiology to talk about options but is unlikely to be a candidate for any surgical intervention or even transcatheter intervention.  Continues to have generalized weakness.  He has occasional bladder spasms during the night that last about 5 minutes at a time.  He has not had further syncopal episodes.  He is not having chest pain or shortness of breath.  He has not had fever sweats or chills.  His appetite is poor.  His bowel habits are poor and he only has a bowel movement about every 4 days and requires a bowel regimen to achieve that.  He has chronic dyspnea on exertion.  Urine culture from 3 days ago has grown out E. coli and 50,000-100,000 colonies on a catheterized specimen sensitive to most organisms including cefazolin.    Allergies:  Allergies   Allergen Reactions     Hydrochlorothiazide Other (See Comments)     Low Potassium     Lisinopril Swelling      Neck swelling/airway     Metoprolol Other (See Comments)     He was seen in the emergency room for lightheadedness after I increased his metoprolol. Patient stopped taking it-noted 6/18/2019.        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) 10/10/2019     Priority: Medium     Gross hematuria 10/07/2019     Priority: Medium     Anemia due to blood loss, acute 10/07/2019     Priority: Medium     Malignant neoplasm (H) 10/07/2019     Priority: Medium     Anemia in neoplastic disease 10/07/2019     Priority: Medium     Syncope 09/30/2019     Priority: Medium     Anemia due to hematuria 09/30/2019     Priority: Medium     Urinary tract infection 09/30/2019     Priority: Medium     Suspected malignant neoplasm 09/30/2019     Priority: Medium     History of gout 09/30/2019     Priority: Medium     Urinary retention 09/30/2019     Priority: Medium     Aortic valve stenosis, severe 05/09/2019     Priority: Medium     Per echocardiogram 10/1/19:  Severe aortic stenosis, mean gradient 43 mm Hg, aortic valve area 0.7 cm2.       Gout, unspecified cause, unspecified chronicity, unspecified site 05/09/2019     Priority: Medium     Essential hypertension, benign 05/09/2019     Priority: Medium     Advanced directives, counseling/discussion 05/16/2018     Priority: Medium     Patient does not have an Advance/Health Care Directive (HCD), requests blank HCD form.    Lucita Farley  May 16, 2018         Hypertension goal BP (blood pressure) < 150/90 03/29/2017     Priority: Medium     Anxiety 06/17/2014     Priority: Medium     Colon polyps 08/01/2012     Priority: Medium     Benign tubular adenomatous polyps - repeat colon in 5 years       Bradycardia 07/27/2012     Priority: Medium     CKD (chronic kidney disease) stage 3,  "GFR 30-59 ml/min (H) 2012     Priority: Medium     Hyperlipidemia LDL goal <100 10/31/2010     Priority: Medium        Past Medical History:    Past Medical History:   Diagnosis Date     Hypertension      Unspecified vertiginous syndromes and labyrinthine disorders        Past Surgical History:    Past Surgical History:   Procedure Laterality Date     COLONOSCOPY  2007    Repeat colonoscopy in 3 years for surveillance     HC REMOVAL OF TONSILS,<13 Y/O         Family History:    Family History   Problem Relation Age of Onset     Hypertension Father      Breast Cancer Sister      Cerebrovascular Disease Sister        Social History:  Marital Status:   [5]  Social History     Tobacco Use     Smoking status: Former Smoker     Years: 25.00     Types: Cigarettes, Pipe, Cigars     Last attempt to quit: 1978     Years since quittin.8     Smokeless tobacco: Never Used   Substance Use Topics     Alcohol use: Yes     Alcohol/week: 60.0 standard drinks     Comment: drinks 2 drinks daily     Drug use: No        Medications:    acetaminophen (TYLENOL) 500 MG tablet  allopurinol (ZYLOPRIM) 100 MG tablet  amLODIPine (NORVASC) 10 MG tablet  losartan (COZAAR) 100 MG tablet  methylphenidate (RITALIN) 10 MG tablet  mineral oil enema  Multiple Vitamins-Minerals (PRESERVISION AREDS PO)  Nutritional Supplements (ENSURE PLUS) LIQD  oxyCODONE (ROXICODONE) 5 MG tablet  polyethylene glycol (MIRALAX/GLYCOLAX) packet  tamsulosin (FLOMAX) 0.4 MG capsule  vitamin D3 (CHOLECALCIFEROL) 2000 units (50 mcg) tablet  lidocaine (XYLOCAINE) 2 % external gel  LORazepam (ATIVAN) 0.5 MG tablet  melatonin 3 MG tablet  order for DME  senna-docusate (SENOKOT-S/PERICOLACE) 8.6-50 MG tablet          Review of Systems  All other systems are reviewed and are negative    Physical Exam   BP: 114/49  Pulse: 58  Temp: 97.5  F (36.4  C)  Resp: 16  Height: 175.3 cm (5' 9\")  Weight: 64 kg (141 lb)  SpO2: 98 %      Physical Exam  Nursing note " and vitals were reviewed.  Constitutional: Awake and alert, pale, chronically ill, fatigued appearing, 85-year-old in no apparent discomfort, who does not appear acutely ill, and who answers questions appropriately and cooperates with examination.  HEENT: His membranes dry.  EOMI.   Neck: Freely mobile.  Cardiovascular: Cardiac examination reveals normal heart rate and regular rhythm without murmur.  Pulmonary/Chest: Breathing is unlabored.  Breath sounds are clear and equal bilaterally.  There no retractions, tachypnea, rales, wheezes, or rhonchi.  Abdomen: Soft, nontender, no HSM or masses rebound or guarding.  Dark blood-tinged urine in the catheter bag  Musculoskeletal: Extremities are warm and well-perfused and without edema  Neurological: Alert, oriented, thought content logical, coherent   Skin: Warm, dry, no rashes.  Psychiatric: Affect broad and appropriate.      ED Course        Procedures               Critical Care time:  none               Results for orders placed or performed during the hospital encounter of 10/18/19 (from the past 24 hour(s))   CBC with platelets differential   Result Value Ref Range    WBC 8.8 4.0 - 11.0 10e9/L    RBC Count 2.45 (L) 4.4 - 5.9 10e12/L    Hemoglobin 7.5 (L) 13.3 - 17.7 g/dL    Hematocrit 22.7 (L) 40.0 - 53.0 %    MCV 93 78 - 100 fl    MCH 30.6 26.5 - 33.0 pg    MCHC 33.0 31.5 - 36.5 g/dL    RDW 14.8 10.0 - 15.0 %    Platelet Count 231 150 - 450 10e9/L    Diff Method Automated Method     % Neutrophils 67.3 %    % Lymphocytes 9.9 %    % Monocytes 9.1 %    % Eosinophils 12.8 %    % Basophils 0.7 %    % Immature Granulocytes 0.2 %    Nucleated RBCs 0 0 /100    Absolute Neutrophil 5.9 1.6 - 8.3 10e9/L    Absolute Lymphocytes 0.9 0.8 - 5.3 10e9/L    Absolute Monocytes 0.8 0.0 - 1.3 10e9/L    Absolute Eosinophils 1.1 (H) 0.0 - 0.7 10e9/L    Absolute Basophils 0.1 0.0 - 0.2 10e9/L    Abs Immature Granulocytes 0.0 0 - 0.4 10e9/L    Absolute Nucleated RBC 0.0    Comprehensive  metabolic panel   Result Value Ref Range    Sodium 134 133 - 144 mmol/L    Potassium 4.5 3.4 - 5.3 mmol/L    Chloride 101 94 - 109 mmol/L    Carbon Dioxide 26 20 - 32 mmol/L    Anion Gap 7 3 - 14 mmol/L    Glucose 110 (H) 70 - 99 mg/dL    Urea Nitrogen 38 (H) 7 - 30 mg/dL    Creatinine 1.27 (H) 0.66 - 1.25 mg/dL    GFR Estimate 51 (L) >60 mL/min/[1.73_m2]    GFR Estimate If Black 59 (L) >60 mL/min/[1.73_m2]    Calcium 9.2 8.5 - 10.1 mg/dL    Bilirubin Total 0.2 0.2 - 1.3 mg/dL    Albumin 2.5 (L) 3.4 - 5.0 g/dL    Protein Total 6.3 (L) 6.8 - 8.8 g/dL    Alkaline Phosphatase 67 40 - 150 U/L    ALT 19 0 - 70 U/L    AST 27 0 - 45 U/L       Medications   ceFAZolin (ANCEF) 1 g vial to attach to  ml bag for ADULT or 50 ml bag for PEDS (has no administration in time range)   0.9% sodium chloride BOLUS (has no administration in time range)       Assessments & Plan (with Medical Decision Making)     85-year-old male presents with anemia due to chronic blood loss from the urinary tract in the setting of widely metastatic cancer.  He requires re-transfusion.  He will be admitted to observation care for this given his multiple comorbidities.  I discussed this with he and his family.  We will initiate treatment for the UTI, culture results resulted today from the culture on 10/15.  We will use cefazolin.  I discussed his case with Terri Brooks of the hospital service and they will assume care and admission.    I have reviewed the nursing notes.    I have reviewed the findings, diagnosis, plan and need for follow up with the patient.       New Prescriptions    No medications on file       Final diagnoses:   Iron deficiency anemia due to chronic blood loss   Prostate cancer metastatic to multiple sites (H)   Generalized weakness   Aortic valve stenosis, etiology of cardiac valve disease unspecified       10/18/2019   [facility] EMERGENCY DEPARTMENT     Dhruv Palacios MD  10/18/19 [id number]

## 2019-10-18 NOTE — TELEPHONE ENCOUNTER
"Attempted to contact patient and received message: \"please enter remote access code\". Left message for daughter Martha. Seen note from care coordination which states that patient in at [facility], staff states they will need written script faxed to [phone]. Routing back to Dr. Walsh. She also states patient is being transferred back to ED for critically low hemoglobin.    Notes recorded by Markell Walsh MD on 10/18/2019 at 10:50 AM CDT  To my RN team,  It appears this patient was in the hospital and discharged when Dr. Rodriguez was on the service.  I do not know if this was taken care of yet.  If he is not on an antibiotic I recommend to take Macrobid tablet one tablet twice a day for 7 days, #14, refill.  Please call to his pharmacy.  Markell Walsh MD  Family Medicine     EVELIN RazoN, RN      "

## 2019-10-18 NOTE — ED PROVIDER NOTES
I was approached by patient's primary nurse- Janie Armijo RN at 5:45 PM.  Patient was requesting a dose of his pain medication. Patient is reported to be on oxycodone for pain and discomfort.  Patient has multiple medical diagnoses and has a history of iron deficiency anemia due to blood loss with prostate cancer with metastasis to multiple sites and a history of generalized weakness and aortic valve stenosis who has been transfused for hemoglobin of 6.7 after he was referred from the infusion center.  He has managed by Dr. Hebert-and considering palliative chemotherapy for widely metastatic prostate cancer per documentation.    The patient was in the department awaiting admission to the hospitalist service.  Patient was seen and examined by Dr.J Palacios.  See ED note by Dr. Palacios for additional details of presentation in the department, ED course, work-up and plan of care. To ensure pain control and to help facilitate care while awaiting admission to the hospitalist service- GABE Brooks PA-C-agreed to assume care upon admission after discussion with Dr. Palacios I ordered a dose of oral oxycodone to help manage patient's pain and comfort.  Patient was not seen or examined.     Dinh Harris MD  10/18/19 [id number]

## 2019-10-18 NOTE — PROGRESS NOTES
Received phone call from [facility] at 11:50 am regarding pt needing a blood transfusion for hemoglobin of 6.7. Pt discharged from the hospital two days ago and has dropped in his hemoglobin since then.  Concerned pt was discharged from the hospital. Informed [facility] we are unable to accommodate pt today due to not having enough chair space.  [facility] decided pt needed to be seen today so stated they would send pt to the ER.     ER called at 1:40 pm asking about why pt is not scheduled in the infusion center for a blood transfusion as pt is at the ED.  Informed ED [facility] decided to have pt be seen in ED, not recommended by infusion staff.  Updated the ED about why pt was there per phone call from [facility] earlier and how they were concerned about the pt.    Pt ended up showing up to the infusion center around 1:55/2:00 pm inquiring if he should be here for  transfusion or the ER.  After speaking with the pt, he wanted to be seen in the ER as he has more blood in his urine (urine bag had brown liquid in it), pt has increased pain, and he thought he might need to be admitted.  Family with pt also wanted pt to be assessed in the ER.  Redirected pt to go to the ER.  No further questions or concerns at this time.

## 2019-10-19 PROBLEM — N39.0 URINARY TRACT INFECTION: Status: RESOLVED | Noted: 2019-01-01 | Resolved: 2019-01-01

## 2019-10-19 PROBLEM — R40.20 LOSS OF CONSCIOUSNESS (H): Status: RESOLVED | Noted: 2019-01-01 | Resolved: 2019-01-01

## 2019-10-19 PROBLEM — R55 SYNCOPE: Status: RESOLVED | Noted: 2019-01-01 | Resolved: 2019-01-01

## 2019-10-19 PROBLEM — N30.01 ACUTE CYSTITIS WITH HEMATURIA: Status: ACTIVE | Noted: 2019-01-01

## 2019-10-19 NOTE — CONSULTS
Care Transition Initial Assessment - RN      Met with: Patient.    DATA   Active Problems:    Anemia due to hematuria       Primary Care Clinic Name: [facility]  Primary Care MD Name: Dr. Shanks  Contact information and PCP information verified: Yes    ASSESSMENT  Cognitive Status: awake, alert and oriented.  Lives With: alone         Description of Support System: Supportive, Involved   Who is your support system?: Children   Support Assessment: Adequate family and caregiver support   Insurance Concerns: No Insurance issues identified      This writer met with pt, introduced self and role.  Discussed discharge planning and Medicare guidelines in regards to home care, TCU and LTC.  The patient was hospitalized 10/15-10/16/19 in Observation Status for syncope.  He transitioned to [facility].  He was admitted from [facility] and plans on returning there upon discharge.  Referral faxed to [facility] on [facility] (Phone: [phone] Fax: [phone]).  Confirmed a bed hold at [facility].  The patient normally lives independently in the community.  His daughters are available and supportive to assist as needed.  Transportation will be provided by daughter upon discharge.    PLAN    Return to [facility]      Rocio George RN, Care Coordinator [phone]        
normal...

## 2019-10-19 NOTE — PLAN OF CARE
Pt blood pressure remains low today-110s/30s-40s.  Blood transfusion complete-waiting for hgb.  Pt wishes to return to the [facility].  Pain around the meatus of the penis.  Very sensitive to touch or movement.  Urojet and antibiotic ointment used as well and securing device changed.  Moved to chair with assist of 1 and walker, tolerated well

## 2019-10-19 NOTE — PLAN OF CARE
[place] [facility] [place]    Patient admitted to room 2207 at approximately 1850 via cart from emergency room. Patient was accompanied by transport tech and daughter.     Verbal SBAR report received from April RN prior to patient arrival.     Patient trasferred to bed via self. Patient alert and oriented X 3. The patient is not having any pain.  . Admission vital signs: Blood pressure (!) 164/55, pulse 62, temperature 96.4  F (35.8  C), temperature source Oral, resp. rate 18, height 1.753 m (5' 9\"), weight 64 kg (141 lb), SpO2 97 %. Patient was oriented to plan of care, call light, bed controls, tv, telephone, bathroom and visiting hours.     Risk Assessment    The following safety risks were identified during admission: fall. Yellow risk band applied: YES.     Skin Initial Assessment    This writer admitted this patient and completed a full skin assessment and Anand score in the Adult PCS flowsheet. Appropriate interventions initiated as needed.     Secondary skin check not completed at this time.       Mary Schirmers, RN    "

## 2019-10-19 NOTE — H&P
[facility]    History and Physical  Hospital Medicine       Date of Admission:  10/18/2019  Date of Service: 10/19/2019     Assessment & Plan   Andres Sow is a 85 year old male who presents on 10/18/2019 with weakness and found to have worsened anemia.      Acute anemia due to hematuria on chronic anemia of malignancy   Since 9/30, patient has had 5 units PRBC already this month prior to this admission. Last was 10/9. Patient c/o weakness and hgb 6.7 in TCU 10/18 and sent to oncology clinic who referred to ED. Recheck in afternoon 7.5. Symptomatic so hospitalized for transfusion. Hgb this morning 10/19 is 7.1 and still weak so second unit PRBC ordered. Follow up hgb 8.9.   - outpatient follow up, likely to need further transfusions but hope to transfuse in infusion center during the week   - though MCV normal, last ferritin was 91 in May, recheck as may benefit from iron if low      Weakness and fatigue   Increased from 2 days prior. Suspect may be related to acute on chronic anemia. Patient also with recent UC drawn 3 days PTA which may cause acute weakness. Many reasons for chronic weakness. Reports some improvement after second unit PRBC.    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 Has seen Dr. Hebert and chemotherapy with carboplatin and etoposide is offered.  Patient has seen Dr. Ndiaye in consultation for tunneled port, but patient is not committed to chemotherapy no port planned.  If he did start chemotherapy, he would prefer a PICC line for trial of first cycle of chemotherapy before committing to a port.    I discussed chemotherapy with the patient and 2 daughters at bedside.  The patient is leaning towards not doing chemotherapy.  He does appear to have very limited reserve and is in and out  of the hospital already due to weakness and ongoing hematuria and is very likely to end up in the hospital after chemotherapy.  Patient and daughters are planning to discuss further with Dr. Aponte regarding likely cancer course without treatment if there are no less harsh treatment options.   -Outpatient follow-up with Dr. Aponte   -If no chemotherapy, patient may benefit from home palliative care or hospice.  These options were introduced as something he may benefit from when the time comes.      Gross hematuria    Urinary retention, catheter dependent   Gross hematuria began in May of this year.  Initial work-up did not reveal a source but cancer diagnosis has been recently made and is likely the cause of the bleeding.  Patient failed off catheter a few weeks ago due to retention and frequency.  Has been difficult at times for catheter to be placed.  Patient has significant pain from catheter and has had periods of obstruction from clots while in the TCU.  The gross hematuria has been intermittent but recurrent.  Having gross hematuria this admission.    The hope is been that this would improve with treatment of the cancer.  Patient is now likely to decline chemotherapy.  Gross hematuria and recurrent anemia is becoming a limiting factor on his quality of life.    - Continue indwelling catheter   - Continue topical pain control and prn oxycodone   - Follow-up with urology regarding other possible options for bleeding control      UTI due to E coli  Urine culture started on last admission was positive for 10-50,000 E. Coli.  As the patient has indwelling catheter, it is difficult to tell if this is true infection. This could be contributing to the hematuria.  Cefazolin started IV.    - changed to oral cephalexin to complete course      Hypertension goal BP (blood pressure) < 150/90  Patient has been running lower blood pressures antihypertensives were recently stopped but then inadvertently restarted on  discharge.  He does not appear to be needing medication at this time.   - stopping amlodipine and losartan      Aortic valve stenosis, severe   AoVA calculated to be 0.7 cm .  Could contribute to prior episodes of syncope.  At this time is having no symptoms of lightheadedness, angina or shortness of breath.       Gout  No active arthritis currently.   - Continue allopurinol    DVT Prophylaxis: VTE Prophylaxis contraindicated due to gross hematuria  Code Status: DNR / DNI    Lines: PIV   Tamez catheter: Indwelling    Disposition: Patient feeling fatigued in part due to poor sleep.  Plan discharge in the morning.   Observation care    Kip Novak MD  [facility] Medicine        Primary Care Physician   Eveline Shanks [place] [phone]    History is obtained from the patient who is a good historian and review of old records via the EMR.    History of Present Illness   Andres Sow is a 85 year old male who presents with weakness and worsening anemia.  Patient has been hospitalized recently from September 30 to October 4 and then again Tober 18th the 16th and was discharged to transitional care unit.  He felt fine at the time of his discharge and again the next day.  However, yesterday he complained of feeling weak and a hemoglobin was checked and was found to be 6.7.  He had been 7.8 on discharge 2 days prior and his previous baseline had been around 9.  He was transfused 3 units of packed red blood cells during the admission of the 30th, transfused 2 more units as an outpatient on October 9.  His hemoglobin was 9.3 on Oct 15 and then 7.8 after hydration.  The TCU provider at the patient brought to the oncology clinic for possible transfusion but this could not be accommodated and the patient was referred to the ED. A recheck hemoglobin in the ED was 7.5.  Patient did appear symptomatic with weakness that was more than he had had the days prior.  Transfusion was started in the ED and he was hospitalized for  monitoring.  Was also noted that a urine culture obtained last admission had returned positive for 10-50,000 E. coli, and cefazolin IV was started.      In the TCU, he is been having gross hematuria with some clots.  The clots have had to be milked to get the urine flowing at times.  He has pain when the catheter is obstructed with clot.       No other source of bleeding.  No fevers, chills, nausea, vomiting, shortness of breath, cough, neck pain or diarrhea.    After 1 unit packed red blood cells, patient's hemoglobin this morning was 7.1.  Continued to feel weakness and second unit packed red blood cells was ordered and transfused prior to this complete interview.  He feels the weakness is now back to where he was few days ago but he is fatigued from having a poor night sleep does not think he has the energy to transfer back to the TCU still today.    Patient last felt well in May.  He then had onset of gross hematuria requiring catheter.  Work-up with urology did not initially find a bleeding source.  A trial off catheter did not go well due to frequency and catheter was replaced.  He continued to have intermittent gross hematuria since May.  A couple of weeks ago patient was found to have metastatic cancer that appears associated with the prostate but had a normal PSA.  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  He has seen Dr. Aponte and chemotherapy is offered but patient is leaning towards not doing this.    Review of Systems   The 10 point Review of Systems is negative other than noted in the HPI or here.  Appetite has been a bit poor.  He has lost some weight since May.  He did have recent syncope but has no dizziness now.  No chest pain.  No significant shortness of breath though does fatigue easily.     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       Gout, unspecified cause, unspecified chronicity, unspecified site 05/09/2019     Priority: Medium     Hypertension goal BP (blood pressure) < 150/90 03/29/2017     Priority: Medium     Anxiety 06/17/2014     Priority: Medium     Colon polyps 08/01/2012     Priority: Medium     Benign tubular adenomatous polyps - repeat colon in 5 years       Bradycardia 07/27/2012     Priority: Medium     CKD (chronic kidney disease) stage 3, GFR 30-59 ml/min (H) 01/01/2012     Priority: Medium     Hyperlipidemia LDL goal <100 10/31/2010     Priority: Medium       Past Surgical History   Past Surgical History:   Procedure Laterality Date     COLONOSCOPY  8/30/2007    Repeat colonoscopy in 3 years for surveillance     HC REMOVAL OF TONSILS,<11 Y/O          Prior to Admission Medications   Prior to Admission Medications   Prescriptions Last Dose Informant Patient Reported? Taking?   Multiple Vitamins-Minerals (PRESERVISION AREDS PO) 10/18/2019 at [facility] Yes Yes   Sig: Take 1 tablet by mouth 2 times daily   Nutritional Supplements (ENSURE PLUS) LIQD 10/18/2019 at [facility] Yes Yes   Sig: Take 6 oz by  mouth 3 times daily Between meals   acetaminophen (TYLENOL) 500 MG tablet 10/18/2019 at 0800 [facility] Yes Yes   Sig: Take 1,000 mg by mouth 3 times daily   allopurinol (ZYLOPRIM) 100 MG tablet 10/18/2019 at 0800 [facility] No Yes   Sig: Take 1 tablet (100 mg) by mouth daily   Patient taking differently: Take 100 mg by mouth every morning    amLODIPine (NORVASC) 10 MG tablet 10/18/2019 at 0800 [facility] No Yes   Sig: Take 1 tablet (10 mg) by mouth daily   Patient taking differently: Take 10 mg by mouth every morning    lidocaine (XYLOCAINE) 2 % external gel Unknown at Unknown time [facility] Yes No   Sig: Apply topically 4 times daily as needed for moderate pain Apply to Tip of penis topically as needed for pain Up to four times daily    losartan (COZAAR) 100 MG tablet 10/18/2019 at 0800 [facility] No Yes   Sig: Take 1 tablet (100 mg) by mouth daily   Patient taking differently: Take 100 mg by mouth every morning    melatonin 3 MG tablet Unknown at Unknown time [facility] Yes No   Sig: Take 3 mg by mouth nightly as needed for sleep   methylphenidate (RITALIN) 10 MG tablet 10/18/2019 at 0600 [facility] No Yes   Sig: Take 1 tablet (10 mg) by mouth 2 times daily   mineral oil enema 10/13/2019 at 1832 [facility] Yes Yes   Sig: Place 1 enema rectally once   order for [facility] No No   Sig: Equipment being ordered: Wheelchair    Dx:  Severe aortic stenosis, neoplastic fatigue   oxyCODONE (ROXICODONE) 5 MG tablet 10/18/2019 at 0201 [facility] No Yes   Sig: Take 1 tablet (5 mg) by mouth every 4 hours as needed for moderate to severe pain   polyethylene glycol (MIRALAX/GLYCOLAX) packet 10/17/2019 at 1339 [facility] Yes Yes   Sig: Take 1 packet by mouth daily as needed for constipation   senna-docusate (SENOKOT-S/PERICOLACE) 8.6-50 MG tablet 10/12/2019 at 1745 [facility] Yes No   Sig: Take 1 tablet by mouth At Bedtime   tamsulosin (FLOMAX) 0.4 MG capsule 10/18/2019 at 0800 [facility] No Yes    Sig: Take 1 capsule (0.4 mg) by mouth daily   Patient taking differently: Take 0.4 mg by mouth every morning    vitamin D3 (CHOLECALCIFEROL) 2000 units (50 mcg) tablet 10/17/2019 at [address] [place] [address] Yes Yes   Sig: Take 1 tablet by mouth every evening      Facility-Administered Medications: None     Allergies   Allergies   Allergen Reactions     Hydrochlorothiazide Other (See Comments)     Low Potassium     Lisinopril Swelling     Neck swelling/airway     Metoprolol Other (See Comments)     He was seen in the emergency room for lightheadedness after I increased his metoprolol. Patient stopped taking it-noted 2019.        Family History    Family History   Problem Relation Age of Onset     Hypertension Father      Breast Cancer Sister      Cerebrovascular Disease Sister    2 sisters had breast cancer    Social History   Social History     Socioeconomic History     Marital status:      Spouse name: Not on file     Number of children: Not on file     Years of education: Not on file     Highest education level: Not on file   Occupational History     Not on file   Social Needs     Financial resource strain: Not on file     Food insecurity:     Worry: Not on file     Inability: Not on file     Transportation needs:     Medical: Not on file     Non-medical: Not on file   Tobacco Use     Smoking status: Former Smoker     Years: 25.00     Types: Cigarettes, Pipe, Cigars     Last attempt to quit: 1978     Years since quittin.8     Smokeless tobacco: Never Used   Substance and Sexual Activity     Alcohol use: Yes     Alcohol/week: 60.0 standard drinks     Comment: drinks 2 drinks daily     Drug use: No     Sexual activity: Not Currently   Lifestyle     Physical activity:     Days per week: Not on file     Minutes per session: Not on file     Stress: Not on file   Relationships     Social connections:     Talks on phone: Not on file     Gets together: Not on file     Attends Yazidi service: Not on file  "    Active member of club or organization: Not on file     Attends meetings of clubs or organizations: Not on file     Relationship status: Not on file     Intimate partner violence:     Fear of current or ex partner: Not on file     Emotionally abused: Not on file     Physically abused: Not on file     Forced sexual activity: Not on file   Other Topics Concern     Parent/sibling w/ CABG, MI or angioplasty before 65F 55M? No   Social History Narrative    October 2, 2019:   11 years ago; has 2 daughters who live nearby.  He quit smoking in '78 with a 25 pack-year history.  He rents a [address]story [employer] in [place] that is [place].  He is a retired .  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  He identifies as Restoration, but drifted away from attending Jainism after his wife's death.  His daughters do housecleaning and grocery shopping for him.  He used to enjoy golf, but has no energy for it now.      Clifford Walsh (Ann), CNP    [facility]    [facility] cell:  [phone]         Physical Exam   /48 (BP Location: Right arm)   Pulse 61   Temp 96.6  F (35.9  C) (Oral)   Resp 16   Ht 1.753 m (5' 9\")   Wt 64.3 kg (141 lb 12.1 oz)   SpO2 95%   BMI 20.93 kg/m       Weight: 141 lbs 12.09 oz Body mass index is 20.93 kg/m .     Constitutional: Alert, oriented, cooperative, appears weak but nontoxic, appears thin.  Eyes: Eyes are clear, pupils are reactive.  HEENT: Oropharynx is clear and moist. No evidence of cranial trauma.  Lymph/Hematologic: No epitrochlear, axillary, anterior or posterior cervical, or supraclavicular lymphadenopathy is appreciated.  Cardiovascular: Slow rate, occasional ectopy, harsh 3 out of 6 systolic murmur heard throughout precordium. JVP is low. Good peripheral pulses in wrists bilaterally. No lower extremity edema.  Respiratory: Clear to auscultation bilaterally.   GI: Soft, non-tender, " normal bowel sounds.  Genitourinary: Penis is enlarged firm and very nodular with moderate tenderness, scant blood around the catheter, mild balanitis  Musculoskeletal: Decreased muscle bulk and normal tone.  Skin: Warm and dry, no rashes.   Neurologic: Neck supple. Cranial nerves are grossly intact.  is symmetric and very strong.     Data   Data reviewed today:   Recent Labs   Lab 10/19/19  1543 10/19/19  0525 10/18/19  1504  10/16/19  0524  10/15/19  1022   WBC  --   --  8.8  --  8.4  --  8.6   HGB 8.9* 7.1* 7.5*   < > 7.8*   < > 9.3*   MCV  --   --  93  --  92  --  92   PLT  --   --  231  --  210  --  245   INR  --   --   --   --   --   --  0.95   NA  --   --  134  --  137  --  134   POTASSIUM  --   --  4.5  --  4.7  --  4.5   CHLORIDE  --   --  101  --  104  --  101   CO2  --   --  26  --  27  --  26   BUN  --   --  38*  --  46*  --  60*   CR  --   --  1.27*  --  1.29*  --  1.74*   ANIONGAP  --   --  7  --  6  --  7   NUVIA  --   --  9.2  --  9.1  --  9.7   GLC  --   --  110*  --  81  --  107*   ALBUMIN  --   --  2.5*  --   --   --  2.8*   PROTTOTAL  --   --  6.3*  --   --   --  6.9   BILITOTAL  --   --  0.2  --   --   --  0.4   ALKPHOS  --   --  67  --   --   --  72   ALT  --   --  19  --   --   --  21   AST  --   --  27  --   --   --  28   TROPI  --   --   --   --   --   --  <0.015    < > = values in this interval not displayed.       I personally reviewed no images or EKG's today.    Kip Novak MD  [facility]

## 2019-10-19 NOTE — PROGRESS NOTES
Note sent to Dr Novak notifying that blood pressure meds held this am due to low bp. Awaiting answer to give

## 2019-10-19 NOTE — PLAN OF CARE
"Patient recently here; \"not necessary for dual skin check.\"    Patient C/O painful tip of penis, patient in extreme discomfort with minimal touching of tubing, received order for urojet prior to triple antibx cream.  Catheter changed out from  leg bag to standard bag.    After urojet used patient felt discomfort was improving.    1 UPRBC infused without difficulty. Re-check HEMOGLOBIN in am.  "

## 2019-10-20 NOTE — PLAN OF CARE
Dr. Novak called charge nurse and asked that patient 's vital signs be obtained once prior to bed and vital signs changed to every 8 hr.  Patient is to sleep without interruption per

## 2019-10-20 NOTE — PROGRESS NOTES
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) [C61]  Aortic valve stenosis, etiology of cardiac valve disease unspecified [I35.0]    Cognitive/Behavioral Status: awake, alert and oriented    Follow-up Appointments:   Future Appointments   Date Time Provider Department Center   10/23/2019  3:00 PM Virginia Hebert MD [facility]   10/29/2019  8:30 AM Schedule, [facility]       Discharge Date:  10/20/2019    Patient/Care Partner in agreement and understands the discharge plan:  Yes    Discharge Disposition:  [facility] (Phone: [phone] Fax: [phone]).         Rocio George RN Care Coordinator  [phone]

## 2019-10-20 NOTE — PROGRESS NOTES
[id number] this writer was getting report when heard a patient hollering in pain.  This writer and ODALIS-RN went in and pt felt he was having a bladder spasm. Irrigation of light done and urine flowing.  Pt states spasm is gone.  Will continue to monitor

## 2019-10-20 NOTE — PLAN OF CARE
Pt tolerating transfer to chair and bathroom well.  VSS,  blood pressure not as low since meds held.  Tylenol for discomfort and urojet.  Catheter care done and farooq care.  Daughter here and discharge paper copy for her as well as papers for Jo given.  Tolerating food and fluid but only eats small amounts at a time.  Very small bowel movement 10/19 however abdomen soft/good bowel sounds and not discomfort.

## 2019-10-20 NOTE — PROGRESS NOTES
[facility] DISCHARGE NOTE    Patient discharged to transitional care unit at 12:30 PM via wheel chair. Accompanied by daughter and staff. Discharge instructions reviewed with patient, daughter and caregiver, opportunity offered to ask questions. Prescriptions sent with patient to fill . All belongings sent with patient.    Stacy Hernandez RN

## 2019-10-20 NOTE — PROGRESS NOTES
urojet done-waited 20 min.  Catheter care and antibiotic ointment placed.  Leg bag attached and NST assisting pt to get ready for discharge

## 2019-10-20 NOTE — DISCHARGE SUMMARY
[facility]  Discharge Summary  Hospital Medicine       Date of Admission:  10/18/2019  Date of Discharge:  10/20/2019 12:30 PM  Discharging Provider: Kip Novak MD      Identification and Chief Compaint: Andres Sow is a 85 year old male who presented on 10/18/2019 with complaint of  weakness and found to have worsened anemia.    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  The following labs/tests are   recommended: Hemoglobin weekly. Follow up with Dr. Hebert as scheduled on   Wednesday. Follow up with Dr. Vega as scheduled.           Hospital Course        Acute anemia due to hematuria on chronic anemia of malignancy   Since 9/30, patient has had 5 units PRBC already this month prior to this admission. Last was 10/9. Patient c/o weakness and hgb 6.7 in TCU 10/18 and sent to oncology clinic who referred to ED. Recheck in afternoon 7.5. Symptomatic so hospitalized for transfusion. Hgb this morning 10/19 is 7.1 and still weak so second unit PRBC ordered. Follow up hgb 8.9 and 8.6 this morning. Ferritin 278. Outpatient follow up, likely to need further transfusions but hope to transfuse in infusion center during the week.        Weakness and fatigue   Increased from 2 days prior. Suspect may be related to acute on chronic anemia. Patient also with recent UC drawn 3 days PTA which may cause acute weakness. Many reasons for chronic weakness. Reports some improvement after second unit PRBC. Return to TCU on discharge and continue to treat underlying  acute issues as able.       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 Has seen Dr. Hebert and chemotherapy with carboplatin and etoposide is offered.  Patient has seen Dr. Ndiaye in consultation for tunneled port, but patient is not committed to chemotherapy no port planned.  If he did start chemotherapy, he would prefer a PICC line for trial of first cycle of chemotherapy before committing to a port.    I discussed chemotherapy with the patient and 2 daughters at bedside.  The patient is leaning towards not doing chemotherapy.  He does appear to have very limited reserve and is in and out of the hospital already due to weakness and ongoing hematuria and is very likely to end up in the hospital after chemotherapy.  Patient and daughters are planning to discuss further with Dr. Hebert regarding likely cancer course without treatment if there are no less harsh treatment options.   Outpatient follow-up with Dr. Hebert. If no chemotherapy, patient may benefit from home palliative care or hospice.  These options were introduced as something he may benefit from when the time comes.       Gross hematuria    Urinary retention, catheter dependent   Gross hematuria began in May of this year.  Initial work-up did not reveal a source but cancer diagnosis has been recently made and is likely the cause of the bleeding.  Patient failed off catheter a few weeks ago due to retention and frequency.  Has been difficult at times for catheter to be placed.  Patient has significant pain from catheter and has had periods of obstruction from clots while in the TCU.  The gross hematuria has been intermittent but recurrent.  Having gross hematuria this admission.    The hope is been that this would improve with treatment of the cancer.  Patient is now likely to decline chemotherapy.  Gross hematuria and recurrent anemia is becoming a limiting  factor on his quality of life. Continue indwelling catheter.  Continue topical pain control and prn oxycodone. Follow-up with urology regarding other possible options for bleeding control.       UTI due to E coli   Urine culture started on last admission was positive for 10-50,000 E. Coli.  As the patient has indwelling catheter, it is difficult to tell if this is true infection. This could be contributing to the hematuria.  Cefazolin started IV. Changed to oral cephalexin to complete course.       Hypertension goal BP (blood pressure) < 150/90  Patient has been running lower blood pressures antihypertensives were recently stopped but then inadvertently restarted on discharge.  He does not appear to be needing medication at this time. Stopping amlodipine and losartan.       Aortic valve stenosis, severe   AoVA calculated to be 0.7 cm .  Could contribute to prior episodes of syncope.  At this time is having no symptoms of lightheadedness, angina or shortness of breath.        Gout  No active arthritis currently. Continue allopurinol>       Consultations This Hospital Stay   None    Code Status   DNR    The discharge plan was discussed with the patient, and he expressed understanding.     Time Spent on this Encounter   Total time on this discharge was 25 minutes.       Kip Novak MD  [facility]  ______________________________________________________________________    Physical Exam   Vital Signs: Temp: 98.4  F (36.9  C) Temp src: Oral BP: (!) 151/52 Pulse: 52   Resp: 16 SpO2: 94 % O2 Device: None (Room air)    Weight: 141 lbs 12.09 oz  Constitutional: alert and oriented, NAD, nontoxic.  CV: Regular, harsh systolic murmur  Respiratory: CTA bilaterally  GI: Soft, non-tender   Skin: Warm and dry       Primary Care Physician   Eveline Shanks  [address]  [facility] [id number]     Discharge Disposition   Discharged to TCU  Condition at discharge: Stable    Significant  Results and Procedures   Results for orders placed or performed during the hospital encounter of 09/30/19   CT Chest Pulmonary Embolism w Contrast    Narrative    CT CHEST WITH CONTRAST  9/30/2019 2:05 PM    HISTORY: Evaluate pulmonary nodules seen on a recent CT of the abdomen  and pelvis.     COMPARISON: A CT of the abdomen and pelvis on 9/26/2019 which included  the lower chest.    TECHNIQUE: Routine transverse CT imaging of the chest was performed  following the uneventful intravenous administration of 68 mL Isovue  370 contrast. Radiation dose for this scan was reduced using automated  exposure control, adjustment of the mA and/or kV according to patient  size, or iterative reconstruction technique.    FINDINGS: The heart size is normal.No enlarged lymph node or other  abnormal mediastinal mass is seen. There is calcification within the  thoracic aorta. The vascular structures are otherwise unremarkable.  There are innumerable nodules seen diffusely throughout both lungs.  The largest is a collection of nodules in the left midlung measuring  up to 2.4 cm demonstrated on series 4 image 68. On the right there is  a 1.7 cm long nodule in the right infrahilar region on series 4 image  74. There is an additional 2.7 cm long mass just to the right of the  heart border on series 4 image 116. There are numerous smaller nodular  densities seen diffusely throughout both lungs. The lungs are  otherwise clear with no consolidation or infiltrate seen. No  pneumothorax is demonstrated. No pleural effusion is identified. There  are degenerative changes in the spine. The coronal series 6 image 94  suggests a 2.3 cm area of increased density in the right aspect of the  T12 vertebral body. This is not calcified. No other osseous  abnormality is noted. No chest wall pathology is seen. The visualized  upper abdomen is unremarkable.      Impression    IMPRESSION:   1. There are innumerable bilateral pulmonary nodules as  described  above. Metastatic disease needs to be considered.  2. There is a 2.3 cm area of increased attenuation within the right  aspect of the T12 vertebral body. This is a nonspecific finding but  metastatic involvement needs to be considered.    KRISTINE JARVIS MD   Echocardiogram Complete    Narrative    [id number]  [id number]  [id number]  [id number]^STEPHANIE^JENNIFER^E           [facility]  Echocardiography Laboratory  [address] [place] [address]        Name: LEIGHANN MOTA  MRN: [id number]  : 1933  Study Date: 10/01/2019 02:14 PM  Age: 85 yrs  Gender: Male  Patient Location: [facility]  Reason For Study: Murmur, Syncope and Collapse  Ordering Physician: JENNIFER BROWN  Referring Physician: Eveline Shanks  Performed By: Jinny Tracey RDCS     BSA: 1.8 m2  Height: 69 in  Weight: 148 lb  HR: 50  BP: 138/51 mmHg  _____________________________________________________________________________  __        Procedure  Complete Portable Echo Adult. Optison (NDC #[id number]) given intravenously.  _____________________________________________________________________________  __        Interpretation Summary     1. The left ventricle is normal in structure, function and size. The visual  ejection fraction is estimated at 55-60%.  2. The right ventricle is normal in structure, function and size.  3. Severe valvular aortic stenosis. Mean 43mmHg, Vmax 4.3m/s, ELLIE 0.7cm2, DI  0.16.     Echo from  showed EF 50-55%, mild aortic stenosis.     Results called to inpatient nursing staff.  _____________________________________________________________________________  __        Left Ventricle  The left ventricle is normal in structure, function and size. There is mild  concentric left ventricular hypertrophy. The visual ejection fraction is  estimated at 55-60%. Grade I or early diastolic dysfunction. Normal left  ventricular wall motion.     Right Ventricle  The right ventricle is normal in  structure, function and size.     Atria  There is mod-severe biatrial enlargement. There is no atrial shunt seen.     Mitral Valve  There is mild mitral annular calcification. There is mild (1+) mitral  regurgitation.        Tricuspid Valve  No tricuspid regurgitation.     Aortic Valve  No aortic regurgitation is present. Severe valvular aortic stenosis. Mean  43mmHg, Vmax 4.3m/s, ELLIE 0.7cm2, DI 0.16.     Pulmonic Valve  The pulmonic valve is normal in structure and function.     Vessels  Normal ascending, transverse (arch), and descending aorta. The inferior vena  cava was normal in size with preserved respiratory variability.     Pericardium  There is no pericardial effusion.        Rhythm  Sinus rhythm was noted.  _____________________________________________________________________________  __  MMode/2D Measurements & Calculations  IVSd: 1.1 cm     LVIDd: 4.7 cm  LVIDs: 3.3 cm  LVPWd: 1.2 cm  FS: 30.2 %  LV mass(C)d: 197.5 grams  LV mass(C)dI: 108.7 grams/m2  Ao root diam: 3.3 cm  LA dimension: 3.4 cm  asc Aorta Diam: 2.8 cm  LA/Ao: 1.0  LVOT diam: 2.5 cm  LVOT area: 5.0 cm2  LA Volume (BP): 100.0 ml  LA Volume Index (BP): 54.9 ml/m2  RWT: 0.53           Doppler Measurements & Calculations  MV E max rashad: 90.5 cm/sec  MV A max rashad: 91.5 cm/sec  MV E/A: 0.99  MV dec time: 0.20 sec  Ao V2 max: 427.6 cm/sec  Ao max P.1 mmHg  Ao V2 mean: 310.8 cm/sec  Ao mean P.7 mmHg  Ao V2 VTI: 119.7 cm  ELLIE(I,D): 0.74 cm2  ELLIE(V,D): 0.79 cm2  LV V1 max P.8 mmHg  LV V1 max: 67.5 cm/sec  LV V1 VTI: 17.8 cm  SV(LVOT): 89.1 ml  SI(LVOT): 49.0 ml/m2  AV Rashad Ratio (DI): 0.16  ELLIE Index (cm2/m2): 0.41  E/E' av.3  Lateral E/e': 7.8  Medial E/e': 8.8              _____________________________________________________________________________  __        Report approved by: Pinky Jackson 10/01/2019 03:34 PM          Procedures    Transfusion 2 units PRBC    Discharge Orders      General info for SNF    Length of  Stay Estimate: Short Term Care: Estimated # of Days <30  Condition at Discharge: Improving  Level of care:skilled   Rehabilitation Potential: Good  Admission H&P remains valid and up-to-date: Yes  Recent Chemotherapy: N/A  Use Nursing Home Standing Orders: Yes     Mantoux instructions    Give two-step Mantoux (PPD) Per Facility Policy Yes     Reason for your hospital stay    Anemia with weakness. Transfused 2 units PRBC.     Tamez catheter    To straight gravity drainage. Change catheter every 2 weeks and PRN for leaking or decreased uring output with signs of bladder distention. DO NOT change catheter without a specific MD order IF diagnosis of benign prostatic hypertrophy (BPH), neurogenic bladder, or other urological conditions     Follow Up and recommended labs and tests    [facility].  The following labs/tests are recommended: Hemoglobin weekly. Follow up with Dr. Hebert as scheduled on Wednesday. Follow up with Dr. Vega as scheduled.     Activity - Up with nursing assistance     Physical Therapy Adult Consult    Evaluate and treat as clinically indicated.    Reason:  Deconditioning     Occupational Therapy Adult Consult    Evaluate and treat as clinically indicated.    Reason:  Deconditioning     Advance Diet as Tolerated    Follow this diet upon discharge:      Regular Diet Adult     Discharge Medications   Current Discharge Medication List      START taking these medications    Details   cephALEXin (KEFLEX) 500 MG capsule Take 1 capsule (500 mg) by mouth 3 times daily Through 10/24/2019    Associated Diagnoses: Urinary tract infection associated with indwelling urethral catheter, initial encounter (H)      Neomycin-Bacitracin-Polymyxin (TRIPLE ANTIBIOTIC) 3.5-400-5000 OINT ointment Apply topically 4 times daily Through 10/22/2019, then 4 times daily as needed    Associated Diagnoses: Balanitis         CONTINUE these medications which have CHANGED    Details   oxyCODONE  (ROXICODONE) 5 MG tablet Take 1 tablet (5 mg) by mouth every 4 hours as needed for moderate to severe pain  Qty: 20 tablet, Refills: 0    Associated Diagnoses: Suspected malignant neoplasm         CONTINUE these medications which have NOT CHANGED    Details   acetaminophen (TYLENOL) 500 MG tablet Take 1,000 mg by mouth 3 times daily      allopurinol (ZYLOPRIM) 100 MG tablet Take 1 tablet (100 mg) by mouth daily  Qty: 90 tablet, Refills: 3    Associated Diagnoses: Gout, unspecified cause, unspecified chronicity, unspecified site      methylphenidate (RITALIN) 10 MG tablet Take 1 tablet (10 mg) by mouth 2 times daily  Qty: 10 tablet, Refills: 0    Associated Diagnoses: Neoplastic (malignant) related fatigue      Multiple Vitamins-Minerals (PRESERVISION AREDS PO) Take 1 tablet by mouth 2 times daily      Nutritional Supplements (ENSURE PLUS) LIQD Take 6 oz by mouth 3 times daily Between meals      polyethylene glycol (MIRALAX/GLYCOLAX) packet Take 1 packet by mouth daily as needed for constipation      tamsulosin (FLOMAX) 0.4 MG capsule Take 1 capsule (0.4 mg) by mouth daily  Qty: 90 capsule, Refills: 3      vitamin D3 (CHOLECALCIFEROL) 2000 units (50 mcg) tablet Take 1 tablet by mouth every evening      lidocaine (XYLOCAINE) 2 % external gel Apply topically 4 times daily as needed for moderate pain Apply to Tip of penis topically as needed for pain Up to four times daily       melatonin 3 MG tablet Take 3 mg by mouth nightly as needed for sleep      order for DME Equipment being ordered: Wheelchair    Dx:  Severe aortic stenosis, neoplastic fatigue  Qty: 1 each, Refills: 0    Associated Diagnoses: Aortic valve stenosis, etiology of cardiac valve disease unspecified; Neoplastic (malignant) related fatigue      senna-docusate (SENOKOT-S/PERICOLACE) 8.6-50 MG tablet Take 1 tablet by mouth At Bedtime         STOP taking these medications       amLODIPine (NORVASC) 10 MG tablet Comments:   Reason for Stopping:          losartan (COZAAR) 100 MG tablet Comments:   Reason for Stopping:         mineral oil enema Comments:   Reason for Stopping:             Allergies   Allergies   Allergen Reactions     Hydrochlorothiazide Other (See Comments)     Low Potassium     Lisinopril Swelling     Neck swelling/airway     Metoprolol Other (See Comments)     He was seen in the emergency room for lightheadedness after I increased his metoprolol. Patient stopped taking it-noted 6/18/2019.

## 2019-10-21 PROBLEM — Z71.89 ADVANCED DIRECTIVES, COUNSELING/DISCUSSION: Status: RESOLVED | Noted: 2018-05-16 | Resolved: 2019-01-01

## 2019-10-21 NOTE — PROGRESS NOTES
"[facility]  [facility] Record Number:  [id number]  Place of Service where encounter took place:  [facility] ([facility]) [[id number]]  Chief Complaint   Patient presents with     Hospital F/U       HPI:    Andres Sow  is a 85 year old (11/21/1933), who is being seen today for an episodic care visit.  HPI information obtained from: facility chart records, facility staff, patient report and [facility] chart review. Today's concern is:  Andres Sow is an 85-year-old gentleman with past medical history of HTN, HLD, CKD stage III, chronic indwelling Tamez catheter, who was admitted after syncopal episode at home.  He had been having hematuria for 3 to 4 months, with reports of worsening fatigue over the past few weeks.  Abdominal CT on 9/26 showed evidence of possible metastatic cancer.  On that same date he was also diagnosed with a UTI and treated with a course of ampicillin.  Hemoglobin in ED was 6.8.  Received 2 units of packed cells, hemoglobin improved to 8.1,, dropped again to 7.1 is transfused an additional 1 unit PRBC.  Underwent prostate biopsy on 10/1, pathology revealed high-grade carcinoma, favoring large cell neuroendocrine carcinoma.  Urology Dr. Vega felt prostate origin unlikely.  Echo on 10/1 showed severe aortic stenosis.  No previous history of heart failure.  Reports he had been diagnosed with a \"murmur\" during physical earlier this year.  Urine culture while inpatient had no growth.  Completed ampicillin on 10/4/2019.  Blood pressure stable.  Was seen by palliative care, started on methylphenidate for fatigue.  Discussed CODE STATUS, opted to remain full code, however was considering changing to DNR.  When medically stable was discharged to TCU for further rehab and medical management.     On 10/7 had Hgb on 7.6 in TCU, which was a 1.5 drop in Hgb in 2 days. He received 2 units PRN on 10/9.      On 10/9 He met with oncology Dr Hebert to discuss " possible palliative chemo therapy.      On 10/15 he met with surgery to discuss possible placement of [id number]. Unfortunately, he had syncopal episode at the office and was sent to the ED and was admitted overnight into observation. Hgb was 9.3 on admission, and syncope was felt to be due to dehydration. He was given IVF, telemetry negative for arrhythmia, and was discharged back to TCU on 10/16. Hgb at that time was 7.6.     On 10/18 in TCU Hgb was 6.7, continued to have significant bleeding and blood clots in urine. Reported fatigue and noted to have soft BP's overnight. TCU contacted infusion center, but they were unable to accomodate him for a transfusion so he was sent to the ED. He remained hospitalized 10/18-10/20. He was transfused 1 unit PRBC On 10/19, f/u Hgb 7.1, so he received a second unit, Hgb 8.9. Ferritin normal, so was not started on an iron supplement. His losartan and amlodipine were discontinued. UC on 10/19 grew 10-50L E. Coli, he was given cefazolin in the ED and transitioned to Cephalexin on discharge. When medically stable he was discharged back to TCU for further rehab and medical management.         Anemia in neoplastic disease  Anemia due to blood loss, acute  Gross hematuria  Chronic indwelling Light catheter  Metastatic cancer (H)  Syncope, unspecified syncope type  Acute cystitis with hematuria  Slow transit constipation  Essential hypertension  Aortic valve stenosis, etiology of cardiac valve disease unspecified  Severe protein-calorie malnutrition (H)  Gout, unspecified cause, unspecified chronicity, unspecified site  Physical deconditioning  CKD (chronic kidney disease) stage 3, GFR 30-59 ml/min (H)  ACP (advance care planning)   Reports he remains very fatigued today. Refused therapy this morning as he was too tired to participate. Discussed Hgb, which dropped from 8.6 yesterday to 7.9 today. He continues to have significant blood and clots in his light. States he would prefer  not to go back to the hospital for more transfusions if possible. Asked questions about LTC and care home, as he is concerned that he will not be able to care for himself or manage his catheter at home as staff have had to milk it at times to dislodge clots to restore flow of urine. Did have some low back pain yesterday. Reports he had a BM on Saturday. Does not have much of an appetite.     Past Medical and Surgical History reviewed in Epic today.    MEDICATIONS:  Current Outpatient Medications   Medication Sig Dispense Refill     acetaminophen (TYLENOL) 500 MG tablet Take 1,000 mg by mouth 3 times daily       allopurinol (ZYLOPRIM) 100 MG tablet Take 1 tablet (100 mg) by mouth daily 90 tablet 3     cephALEXin (KEFLEX) 500 MG capsule Take 1 capsule (500 mg) by mouth 3 times daily Through 10/24/2019       lidocaine (XYLOCAINE) 2 % external gel Apply topically 4 times daily as needed for moderate pain Apply to Tip of penis topically as needed for pain Up to four times daily        melatonin 3 MG tablet Take 3 mg by mouth nightly as needed for sleep       methylphenidate (RITALIN) 10 MG tablet Take 1 tablet (10 mg) by mouth 2 times daily 60 tablet 0     Multiple Vitamins-Minerals (PRESERVISION AREDS PO) Take 1 tablet by mouth 2 times daily       Neomycin-Bacitracin-Polymyxin (TRIPLE ANTIBIOTIC) 3.5-400-5000 OINT ointment Apply topically 4 times daily Through 10/22/2019, then 4 times daily as needed       Nutritional Supplements (ENSURE PLUS) LIQD Take 6 oz by mouth 3 times daily Between meals       order for DME Equipment being ordered: Wheelchair    Dx:  Severe aortic stenosis, neoplastic fatigue 1 each 0     oxyCODONE (ROXICODONE) 5 MG tablet Take 1 tablet (5 mg) by mouth every 4 hours as needed for moderate to severe pain 20 tablet 0     polyethylene glycol (MIRALAX/GLYCOLAX) packet Take 1 packet by mouth daily as needed for constipation       senna-docusate (SENOKOT-S/PERICOLACE) 8.6-50 MG tablet Take 1 tablet by mouth  "At Bedtime       tamsulosin (FLOMAX) 0.4 MG capsule Take 1 capsule (0.4 mg) by mouth daily 90 capsule 3     vitamin D3 (CHOLECALCIFEROL) 2000 units (50 mcg) tablet Take 1 tablet by mouth every evening           REVIEW OF SYSTEMS:  10 point ROS of systems including Constitutional, Eyes, Respiratory, Cardiovascular, Gastroenterology, Genitourinary, Integumentary, Musculoskeletal, Psychiatric were all negative except for pertinent positives noted in my HPI.    Objective:  /53   Pulse 57   Temp 98.1  F (36.7  C)   Resp 16   Ht 1.753 m (5' 9\")   Wt 63.5 kg (140 lb)   SpO2 95%   BMI 20.67 kg/m    Exam:  GENERAL APPEARANCE:  Alert, in no distress, thin, cooperative, pale, frail  RESP:  respiratory effort and palpation of chest normal, lungs clear to auscultation , no respiratory distress  CV:  Palpation and auscultation of heart done , +2 pedal pulses, rate-normal, grade 3/6 murmur  ABDOMEN:  no guarding or rebound, bowel sounds normal  M/S:   generalized weakness, no gross joint deformities  SKIN:  Inspection of skin and subcutaneous tissue baseline  NEURO:   Cranial nerves 2-12 are normal tested and grossly at patient's baseline  PSYCH:  oriented X 3, normal insight, judgement and memory, affect and mood normal    Labs:   Recent labs in [facility] reviewed by me today.  and   Most Recent 3 CBC's:  Recent Labs   Lab Test 10/21/19  0621 10/20/19  0757 10/19/19  1543  10/18/19  1504  10/16/19  0524   WBC 8.8  --   --   --  8.8  --  8.4   HGB 7.9* 8.6* 8.9*   < > 7.5*   < > 7.8*   MCV 92  --   --   --  93  --  92     --   --   --  231  --  210    < > = values in this interval not displayed.       ASSESSMENT/PLAN:  (D63.0) Anemia in neoplastic disease  (primary encounter diagnosis)  (D62) Anemia due to blood loss, acute  (R31.0) Gross hematuria  Comment: Continues to have significant bleeding, h/o syncope x2 in the past 3 weeks. C/o fatigue, weakness. Hgb 8.6-> 7.9 in 24 hours with continued bleeding  Plan: Will " transfuse 2 units PRBC on 10/23 at transfusion center. Monitor VS BID and PRN. Activity as tolerated.     (Z96.0) Chronic indwelling Tamez catheter  Comment: H/o retention, follows with Dr . Staff does need to milk catheter at times d/t clots to allow for continued flow.   Plan: Catheter change as scheduled with urology on 10/29 - will not change at facility as likely difficulty exchange. Staff to educate patient on trouble shooting and catheter cares, but he may benefit from LTC For catheter management at discontinue. Continue tamsulosin 0.4mg daily, lidocaine and triple abx ointment PRN for discomfort    (C79.9) Metastatic cancer (H)  Comment: Found to have a pelvic mass. Prostate biopsy consistent with a high grade cancer, likely neuroendocrine. However, per urology Dr Vega prostate unlikely to be primary source. Imaging with multiple pulmonary nodules and a T12 vertebral body lesion - all suspicious for metastasis. Met with oncology on 10/9 to discuss possible palliative chemo. He is uncertain if he wants to pursue chemo, is considering hospice/palliative care.    Plan: Care conference at TCU on 10/22 to discuss discharge options.  F/u with Dr Hebert as scheduled on 10/23. Continue oxycodone 5mg PRN for low back pain, methylphenidate 10mg BID for fatigue.     (R55) Syncope, unspecified syncope type  Comment: likely d/t anemia, dehydration  Plan: Encourage adequate fluid intake, blood transfusion as above    (N30.01) Acute cystitis with hematuria  Comment: UC grew 10-50K E.coli  Plan: Complete course of Keflex 500mg TID through 10/24    (K59.01) Slow transit constipation  Comment: BM q 3-4 days at baseline, reports last BM on 10/19; poor appetitie  Plan: Continue senna-a 1 tab po at bedtime, 1 tab daily PRN, miralax 17 gm daily PRN.     (I10) Essential hypertension  Comment: 's-110's in TCU, BP medications discontinued during hospitalization  Plan: Monitor  - BP BID and PRN    (I35.0) Aortic valve  stenosis, severe  Comment: New on 9/30 echo, no lightheadedness, dizziness, of palpitations.   Plan: Consider f/u with cardiology after TCU discharge pending patient's goals of care.    (E43) Severe protein-calorie malnutrition (H)  Comment: BMI 20.7, poor oral intake  Plan: Dietician to follow and manage dietary supplements in TCU    (M10.9) Gout, unspecified cause, unspecified chronicity, unspecified site  Comment: No recent flare-ups  Plan: continue allopuriol 100mg daily.     (R53.81) Physical deconditioning  Comment: likely d/t anemia, fatigue, underlying malignancy  Plan: PT/OT - care conference on 10/22 to discuss goals of care and discharge plan    (N18.3) CKD (chronic kidney disease) stage 3, GFR 30-59 ml/min (H)  Comment: GFR 35-58  Plan: Avoid nephrotoxic medications, BMP PRN    (Z71.89) ACP (advance care planning)  Comment: Discussed code status with patient  Plan: DNR/DNI  - POLST completed to reflect this.     transcribed by : Sarah Rueda  Orders:  1. Code status change  To DNR/DNI  2. Transfuse 2 units PRBC  - please schedule @ infusion center. orders in Epic. Pt prefers on 10/23 prior to oncology appt. Send copy of blood consent with patient done on 10/7/19.  3. Hgb on 10/24 - dx: anemia  4. Discontinue Ativan as not being used  5. Catheter to be changed with Urology on 10/29/19 @ 0830 - DO NOT CHANGE AT TCU    Total time spent with patient visit at the skilled nursing facility was 35 min including patient visit and review of past records. Greater than 50% of total time spent with counseling and coordinating care due to discussion of current lab results, blood transfusion and continued bleeding in catheter, discussion of code status and plan of care in TCU, discussed potential discharge options of LTC vs retirement, discussed catheter cares and currently medical apt with patient and coordinated plan of care with nursing staff d/t anemia, gross hematuria, fatigue metastatic cancer, pain  control, weakness, syncope, UTI.  Electronically signed by:  ALEXX Morris CNP

## 2019-10-21 NOTE — PROGRESS NOTES
Clinic Care Coordination Contact  Care Coordination Transition Communication         Clinical Data: Patient was hospitalized at [facility] from 10/18/19 to 10/20/19 with diagnosis of UTI.     Transition to Facility:              [facility] Name: [facility]              Contact name and phone number/fax: [place] [phone]    Plan: RN/SW Care Coordinator will await notification from facility staff informing RN/SW Care Coordinator of patient's discharge plans/needs. RN/SW Care Coordinator will review chart and outreach to facility staff every 4 weeks and as needed.     Namita Parr RN, [facility] RN Coordinator  [facility]   10/21/2019    8:13 AM  [phone]

## 2019-10-21 NOTE — LETTER
"    10/21/2019        RE: Andres Sow  [address] [facility] [address]        [facility]  [facility] Medical Record Number:  [id number]  Place of Service where encounter took place:  [facility] ([facility]) [[id number]]  Chief Complaint   Patient presents with     Hospital F/U       HPI:    Andres Sow  is a 85 year old (11/21/1933), who is being seen today for an episodic care visit.  HPI information obtained from: facility chart records, facility staff, patient report and [facility] Epic chart review. Today's concern is:  Andres Sow is an 85-year-old gentleman with past medical history of HTN, HLD, CKD stage III, chronic indwelling Tamez catheter, who was admitted after syncopal episode at home.  He had been having hematuria for 3 to 4 months, with reports of worsening fatigue over the past few weeks.  Abdominal CT on 9/26 showed evidence of possible metastatic cancer.  On that same date he was also diagnosed with a UTI and treated with a course of ampicillin.  Hemoglobin in ED was 6.8.  Received 2 units of packed cells, hemoglobin improved to 8.1,, dropped again to 7.1 is transfused an additional 1 unit PRBC.  Underwent prostate biopsy on 10/1, pathology revealed high-grade carcinoma, favoring large cell neuroendocrine carcinoma.  Urology Dr. Vega felt prostate origin unlikely.  Echo on 10/1 showed severe aortic stenosis.  No previous history of heart failure.  Reports he had been diagnosed with a \"murmur\" during physical earlier this year.  Urine culture while inpatient had no growth.  Completed ampicillin on 10/4/2019.  Blood pressure stable.  Was seen by palliative care, started on methylphenidate for fatigue.  Discussed CODE STATUS, opted to remain full code, however was considering changing to DNR.  When medically stable was discharged to TCU for further rehab and medical management.     On 10/7 had Hgb on 7.6 in TCU, which was a 1.5 drop in Hgb in 2 days. " He received 2 units PRN on 10/9.      On 10/9 He met with oncology Dr Hebert to discuss possible palliative chemo therapy.      On 10/15 he met with surgery to discuss possible placement of [id number]. Unfortunately, he had syncopal episode at the office and was sent to the ED and was admitted overnight into observation. Hgb was 9.3 on admission, and syncope was felt to be due to dehydration. He was given IVF, telemetry negative for arrhythmia, and was discharged back to TCU on 10/16. Hgb at that time was 7.6.     On 10/18 in TCU Hgb was 6.7, continued to have significant bleeding and blood clots in urine. Reported fatigue and noted to have soft BP's overnight. TCU contacted infusion center, but they were unable to accomodate him for a transfusion so he was sent to the ED. He remained hospitalized 10/18-10/20. He was transfused 1 unit PRBC On 10/19, f/u Hgb 7.1, so he received a second unit, Hgb 8.9. Ferritin normal, so was not started on an iron supplement. His losartan and amlodipine were discontinued. UC on 10/19 grew 10-50L E. Coli, he was given cefazolin in the ED and transitioned to Cephalexin on discharge. When medically stable he was discharged back to TCU for further rehab and medical management.         Anemia in neoplastic disease  Anemia due to blood loss, acute  Gross hematuria  Chronic indwelling Tamez catheter  Metastatic cancer (H)  Syncope, unspecified syncope type  Acute cystitis with hematuria  Slow transit constipation  Essential hypertension  Aortic valve stenosis, etiology of cardiac valve disease unspecified  Severe protein-calorie malnutrition (H)  Gout, unspecified cause, unspecified chronicity, unspecified site  Physical deconditioning  CKD (chronic kidney disease) stage 3, GFR 30-59 ml/min (H)  ACP (advance care planning)   Reports he remains very fatigued today. Refused therapy this morning as he was too tired to participate. Discussed Hgb, which dropped from 8.6 yesterday to 7.9  today. He continues to have significant blood and clots in his light. States he would prefer not to go back to the hospital for more transfusions if possible. Asked questions about LTC and half-way, as he is concerned that he will not be able to care for himself or manage his catheter at home as staff have had to milk it at times to dislodge clots to restore flow of urine. Did have some low back pain yesterday. Reports he had a BM on Saturday. Does not have much of an appetite.     Past Medical and Surgical History reviewed in Epic today.    MEDICATIONS:  Current Outpatient Medications   Medication Sig Dispense Refill     acetaminophen (TYLENOL) 500 MG tablet Take 1,000 mg by mouth 3 times daily       allopurinol (ZYLOPRIM) 100 MG tablet Take 1 tablet (100 mg) by mouth daily 90 tablet 3     cephALEXin (KEFLEX) 500 MG capsule Take 1 capsule (500 mg) by mouth 3 times daily Through 10/24/2019       lidocaine (XYLOCAINE) 2 % external gel Apply topically 4 times daily as needed for moderate pain Apply to Tip of penis topically as needed for pain Up to four times daily        melatonin 3 MG tablet Take 3 mg by mouth nightly as needed for sleep       methylphenidate (RITALIN) 10 MG tablet Take 1 tablet (10 mg) by mouth 2 times daily 60 tablet 0     Multiple Vitamins-Minerals (PRESERVISION AREDS PO) Take 1 tablet by mouth 2 times daily       Neomycin-Bacitracin-Polymyxin (TRIPLE ANTIBIOTIC) 3.5-400-5000 OINT ointment Apply topically 4 times daily Through 10/22/2019, then 4 times daily as needed       Nutritional Supplements (ENSURE PLUS) LIQD Take 6 oz by mouth 3 times daily Between meals       order for DME Equipment being ordered: Wheelchair    Dx:  Severe aortic stenosis, neoplastic fatigue 1 each 0     oxyCODONE (ROXICODONE) 5 MG tablet Take 1 tablet (5 mg) by mouth every 4 hours as needed for moderate to severe pain 20 tablet 0     polyethylene glycol (MIRALAX/GLYCOLAX) packet Take 1 packet by mouth daily as needed for  "constipation       senna-docusate (SENOKOT-S/PERICOLACE) 8.6-50 MG tablet Take 1 tablet by mouth At Bedtime       tamsulosin (FLOMAX) 0.4 MG capsule Take 1 capsule (0.4 mg) by mouth daily 90 capsule 3     vitamin D3 (CHOLECALCIFEROL) 2000 units (50 mcg) tablet Take 1 tablet by mouth every evening           REVIEW OF SYSTEMS:  10 point ROS of systems including Constitutional, Eyes, Respiratory, Cardiovascular, Gastroenterology, Genitourinary, Integumentary, Musculoskeletal, Psychiatric were all negative except for pertinent positives noted in my HPI.    Objective:  /53   Pulse 57   Temp 98.1  F (36.7  C)   Resp 16   Ht 1.753 m (5' 9\")   Wt 63.5 kg (140 lb)   SpO2 95%   BMI 20.67 kg/m     Exam:  GENERAL APPEARANCE:  Alert, in no distress, thin, cooperative, pale, frail  RESP:  respiratory effort and palpation of chest normal, lungs clear to auscultation , no respiratory distress  CV:  Palpation and auscultation of heart done , +2 pedal pulses, rate-normal, grade 3/6 murmur  ABDOMEN:  no guarding or rebound, bowel sounds normal  M/S:   generalized weakness, no gross joint deformities  SKIN:  Inspection of skin and subcutaneous tissue baseline  NEURO:   Cranial nerves 2-12 are normal tested and grossly at patient's baseline  PSYCH:  oriented X 3, normal insight, judgement and memory, affect and mood normal    Labs:   Recent labs in [facility] reviewed by me today.  and   Most Recent 3 CBC's:  Recent Labs   Lab Test 10/21/19  0621 10/20/19  0757 10/19/19  1543  10/18/19  1504  10/16/19  0524   WBC 8.8  --   --   --  8.8  --  8.4   HGB 7.9* 8.6* 8.9*   < > 7.5*   < > 7.8*   MCV 92  --   --   --  93  --  92     --   --   --  231  --  210    < > = values in this interval not displayed.       ASSESSMENT/PLAN:  (D63.0) Anemia in neoplastic disease  (primary encounter diagnosis)  (D62) Anemia due to blood loss, acute  (R31.0) Gross hematuria  Comment: Continues to have significant bleeding, h/o syncope x2 in the " past 3 weeks. C/o fatigue, weakness. Hgb 8.6-> 7.9 in 24 hours with continued bleeding  Plan: Will transfuse 2 units PRBC on 10/23 at transfusion center. Monitor VS BID and PRN. Activity as tolerated.     (Z96.0) Chronic indwelling Tamez catheter  Comment: H/o retention, follows with  . Staff does need to milk catheter at times d/t clots to allow for continued flow.   Plan: Catheter change as scheduled with urology on 10/29 - will not change at facility as likely difficulty exchange. Staff to educate patient on trouble shooting and catheter cares, but he may benefit from LTC For catheter management at discontinue. Continue tamsulosin 0.4mg daily, lidocaine and triple abx ointment PRN for discomfort    (C79.9) Metastatic cancer (H)  Comment: Found to have a pelvic mass. Prostate biopsy consistent with a high grade cancer, likely neuroendocrine. However, per urology Dr Vega prostate unlikely to be primary source. Imaging with multiple pulmonary nodules and a T12 vertebral body lesion - all suspicious for metastasis. Met with oncology on 10/9 to discuss possible palliative chemo. He is uncertain if he wants to pursue chemo, is considering hospice/palliative care.    Plan: Care conference at TCU on 10/22 to discuss discharge options.  F/u with Dr Hebert as scheduled on 10/23. Continue oxycodone 5mg PRN for low back pain, methylphenidate 10mg BID for fatigue.     (R55) Syncope, unspecified syncope type  Comment: likely d/t anemia, dehydration  Plan: Encourage adequate fluid intake, blood transfusion as above    (N30.01) Acute cystitis with hematuria  Comment: UC grew 10-50K E.coli  Plan: Complete course of Keflex 500mg TID through 10/24    (K59.01) Slow transit constipation  Comment: BM q 3-4 days at baseline, reports last BM on 10/19; poor appetitie  Plan: Continue senna-a 1 tab po at bedtime, 1 tab daily PRN, miralax 17 gm daily PRN.     (I10) Essential hypertension  Comment: 's-110's in TCU, BP medications  discontinued during hospitalization  Plan: Monitor  - BP BID and PRN    (I35.0) Aortic valve stenosis, severe  Comment: New on 9/30 echo, no lightheadedness, dizziness, of palpitations.   Plan: Consider f/u with cardiology after TCU discharge pending patient's goals of care.    (E43) Severe protein-calorie malnutrition (H)  Comment: BMI 20.7, poor oral intake  Plan: Dietician to follow and manage dietary supplements in TCU    (M10.9) Gout, unspecified cause, unspecified chronicity, unspecified site  Comment: No recent flare-ups  Plan: continue allopuriol 100mg daily.     (R53.81) Physical deconditioning  Comment: likely d/t anemia, fatigue, underlying malignancy  Plan: PT/OT - care conference on 10/22 to discuss goals of care and discharge plan    (N18.3) CKD (chronic kidney disease) stage 3, GFR 30-59 ml/min (H)  Comment: GFR 35-58  Plan: Avoid nephrotoxic medications, BMP PRN    (Z71.89) ACP (advance care planning)  Comment: Discussed code status with patient  Plan: DNR/DNI  - POLST completed to reflect this.     transcribed by : Sarah Rueda  Orders:  1. Code status change  To DNR/DNI  2. Transfuse 2 units PRBC  - please schedule @ infusion center. orders in Epic. Pt prefers on 10/23 prior to oncology appt. Send copy of blood consent with patient done on 10/7/19.  3. Hgb on 10/24 - dx: anemia  4. Discontinue Ativan as not being used  5. Catheter to be changed with Urology on 10/29/19 @ 0830 - DO NOT CHANGE AT TCU    Total time spent with patient visit at the skilled nursing facility was 35 min including patient visit and review of past records. Greater than 50% of total time spent with counseling and coordinating care due to discussion of current lab results, blood transfusion and continued bleeding in catheter, discussion of code status and plan of care in TCU, discussed potential discharge options of LTC vs group home, discussed catheter cares and currently medical apt with patient and coordinated  plan of care with nursing staff d/t anemia, gross hematuria, fatigue metastatic cancer, pain control, weakness, syncope, UTI.  Electronically signed by:  ALEXX Morris CNP             Sincerely,        ALEXX Morris CNP

## 2019-10-22 NOTE — TELEPHONE ENCOUNTER
Changed to bactrim due to kidney function now.  Printed prescription.  Markell Walsh MD  Family Medicine

## 2019-10-23 NOTE — LETTER
10/23/2019         RE: Adnres Sow  [address] [facility] [address]        Dear Colleague,    Thank you for referring your patient, Andres Sow, to the [facility], operated by [facility]. Please see a copy of my visit note below.    Patient left clinic prior to information on hospice or carboplatin/etopside given. Message left on patient's voicemail requesting a call back. Leslie Hein RN, BSN, OCN on 10/23/2019 at 3:53 PM      DATE OF VISIT: Oct 23, 2019    Andres Sow is a 85 year old male is seen today for No chief complaint on file.  .       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  I discussed with him the home of palliative chemotherapy.  I would recommend chemotherapy with carboplatin and etoposide. I gave him an overview about potential benefits as well as side effects.  Patient is not interested to proceed with chemotherapy.  I will give him more information about chemotherapy will call us if he decided to proceed.  We also discussed about palliative approach as well as symptom management including blood transfusions.  I talked to him about hospice program in details.  We will facilitate consultation with hospice team if the patient decides to proceed with hospice.  I have not scheduled the patient for any further appointments I will see him in the future if there are new the vomits or concerns.    The patient is ready to learn, no apparent learning barriers were identified.  Diagnosis and treatment plans were explained to the patient. The patient expressed understanding of the content. The patient asked appropriate questions. The patient questions were answered to his satisfaction.  Time spent 25 minutes more than 50% of  the time in counseling coronation of care including discussion of management of metastatic neuroendocrine tumor, potential side effects of chemotherapy, role of palliative chemotherapy, symptom management and hospice, follow-up and prognosis.  Chart documentation with Dragon Voice recognition Software. Although reviewed after completion, some words and grammatical errors may remain.      Again, thank you for allowing me to participate in the care of your patient.        Sincerely,        Virginia Hebert MD

## 2019-10-23 NOTE — PROGRESS NOTES
Patient left clinic prior to information on hospice or carboplatin/etopside given. Message left on patient's voicemail requesting a call back. Leslie Hein RN, BSN, OCN on 10/23/2019 at 3:53 PM

## 2019-10-23 NOTE — PROGRESS NOTES
Infusion Nursing Note:  Andres Sow presents today for 2 units PRBCs.    Patient seen by provider today: Yes, Dr. Hebert   present during visit today: Not Applicable.    Note: N/A.    Intravenous Access:  Peripheral IV placed.    Treatment Conditions:  Lab Results   Component Value Date    HGB 7.9 10/21/2019     Lab Results   Component Value Date    WBC 8.8 10/21/2019      Lab Results   Component Value Date    ANEU 5.9 10/18/2019     Lab Results   Component Value Date     10/21/2019    Consent from 10/7/2019    Post Infusion Assessment:  Patient tolerated infusion without incident.  Site patent and intact, free from redness, edema or discomfort.  No evidence of extravasations.  Access discontinued per protocol.     Discharge Plan:   Discharge instructions reviewed with: Patient.  Patient and/or family verbalized understanding of discharge instructions and all questions answered.  Copy of AVS reviewed with patient and/or family.  Patient will return as directed by MD for next appointment.  Patient discharged in stable condition accompanied by: self and daughters.  Departure Mode: Wheelchair.    Ronda Horn RN

## 2019-10-23 NOTE — PATIENT INSTRUCTIONS
Dr. SIL Hebert has referred you to our [facility] program. Our hospice team has been notified and will contact you to set up an in-home visit. To reach them please call [phone]. If you have any questions please call KWABENA Nelson, BSN- Oncology Care Coordinator  @ [phone]. Thank you.    Also, you will receive information on carboplatin, etoposide.

## 2019-10-28 NOTE — LETTER
10/28/2019        RE: Andres Sow  [address]  [facility] [address]        [facility]  [place] Medical Record Number:  [id number]  Place of Service where encounter took place:  [facility] ([facility]) [[id number]]  Chief Complaint   Patient presents with     RECHECK       HPI:    Andres Sow  is a 85 year old (11/21/1933), who is being seen today for an episodic care visit.  HPI information obtained from: facility chart records, facility staff, patient report and [facility] chart review. Today's concern is:     Malignant neoplasm (H)  Metastatic cancer (H)  Anemia in neoplastic disease  Gross hematuria  Anemia due to blood loss, acute  Chronic indwelling Tamez catheter  Syncope, unspecified syncope type  Acute cystitis with hematuria   Patient recent diagnosed with pelvic mass, found to be neuroendocrine tumor with mets. . Has had gross hematuria d/t involvement of prostate. Has had multiple blood transfusion to continued bleeding. Last transfusion was 10/23 where he received 2 units PRBC. Hgb on 10/24 was 9.9, stable at 9.3 on lab draw today. Continues to have some bleeding in catheter today, but is improved from last week. He does report some discomfort from catheter. Recently completed course of keflex for UTI.     He was seen by Dr Hebert on 10/24 - he opted not to pursue chemotherapy at that time. He met with hospice on 10/25, but has not yet decided if he wants to sign on. He reports feeling very tired today. Reports some neck pain and catheter discomfort. Has poor appetite, had BM yesterday, reports typically has a BM every 3-4 days.     Past Medical and Surgical History reviewed in Epic today.    MEDICATIONS:  Current Outpatient Medications   Medication Sig Dispense Refill     acetaminophen (TYLENOL) 500 MG tablet Take 1,000 mg by mouth 3 times daily       allopurinol (ZYLOPRIM) 100 MG tablet Take 1 tablet (100 mg) by mouth daily 90 tablet 3      "lidocaine (XYLOCAINE) 2 % external gel Apply topically 4 times daily as needed for moderate pain Apply to Tip of penis topically as needed for pain Up to four times daily        melatonin 3 MG tablet Take 3 mg by mouth nightly as needed for sleep       methylphenidate (RITALIN) 10 MG tablet Take 1 tablet (10 mg) by mouth 2 times daily 14 tablet 0     Multiple Vitamins-Minerals (PRESERVISION AREDS PO) Take 1 tablet by mouth 2 times daily       Neomycin-Bacitracin-Polymyxin (TRIPLE ANTIBIOTIC) 3.5-400-5000 OINT ointment Apply topically 4 times daily Through 10/22/2019, then 4 times daily as needed       Nutritional Supplements (ENSURE PLUS) LIQD Take 6 oz by mouth 3 times daily Between meals       order for DME Equipment being ordered: Wheelchair    Dx:  Severe aortic stenosis, neoplastic fatigue 1 each 0     oxyCODONE (ROXICODONE) 5 MG tablet Take 1 tablet (5 mg) by mouth every 4 hours as needed for moderate to severe pain 20 tablet 0     polyethylene glycol (MIRALAX/GLYCOLAX) packet Take 1 packet by mouth daily as needed for constipation       senna-docusate (SENOKOT-S/PERICOLACE) 8.6-50 MG tablet Take 1 tablet by mouth At Bedtime       sulfamethoxazole-trimethoprim (BACTRIM DS/SEPTRA DS) 800-160 MG tablet Take 1 tablet by mouth 2 times daily for 7 days 14 tablet 0     tamsulosin (FLOMAX) 0.4 MG capsule Take 1 capsule (0.4 mg) by mouth daily 90 capsule 3     vitamin D3 (CHOLECALCIFEROL) 2000 units (50 mcg) tablet Take 1 tablet by mouth every evening       cephALEXin (KEFLEX) 500 MG capsule Take 1 capsule (500 mg) by mouth 3 times daily Through 10/24/2019           REVIEW OF SYSTEMS:  4 point ROS including Respiratory, CV, GI and , other than that noted in the HPI,  is negative    Objective:  /56   Pulse 55   Temp 98.6  F (37  C)   Resp 16   Ht 1.753 m (5' 9\")   Wt 64.4 kg (142 lb)   SpO2 96%   BMI 20.97 kg/m     Exam:  GENERAL APPEARANCE:  Alert, in no distress, oriented, thin, cooperative, pale, " frail  RESP:  respiratory effort and palpation of chest normal, lungs clear to auscultation , no respiratory distress  CV:  Palpation and auscultation of heart done , no edema, rate-normal, grade 3/6 murmur  ABDOMEN:  no guarding or rebound, bowel sounds normal  :    light catheter in place, red tinged urine with some red sediment   M/S:   generalized weakness, no gross joint tenderness  SKIN:  Inspection of skin and subcutaneous tissue baseline  NEURO:   Cranial nerves 2-12 are normal tested and grossly at patient's baseline  PSYCH:  oriented X 3, normal insight, judgement and memory, affect and mood normal    Labs:   Recent labs in [facility] reviewed by me today.  and   Most Recent 3 CBC's:  Recent Labs   Lab Test 10/28/19  0805 10/24/19  0818 10/21/19  0621  10/18/19  1504   WBC 9.0  --  8.8  --  8.8   HGB 9.3* 9.9* 7.9*   < > 7.5*   MCV 94  --  92  --  93     --  225  --  231    < > = values in this interval not displayed.       ASSESSMENT/PLAN:  (C80.1) Malignant neoplasm (H)  (primary encounter diagnosis)  (C79.9) Metastatic cancer (H)  Comment: Neuroendocrine tumor. Has declined palliative chemo. Met with hospice on 10/25, undecided if he will sign on. LCD for therapy is this week.   Plan: Patient to make decision on hospice on 10/29, will f/u for discharge planning at that time.     (R31.0) Gross hematuria  (D63.0) Anemia in neoplastic disease  (D62) Anemia due to blood loss, acute  (Z96.0) Chronic indwelling Light catheter  Comment: Hgb stable, bleeding appears reduced. Patient uncertain if he wants further transfusions  Plan: Will defer further lab draws at this time until patient decides on hospice. Monitor VS    (R55) Syncope, unspecified syncope type  Comment: In the setting of anemia and dehydration  Plan: Diet as tolerated, monitor VS, defer lab work until hospice decision made.     (N30.01) Acute cystitis with hematuria  Comment: appears resolved, asymptomatic, bleeding resolved.   Plan: Monitor,  continue current catheter cares. Change out at urology office.     transcribed by : Sarah Rueda  Orders:  1. NNO      Electronically signed by:  ALEXX Morris CNP             Sincerely,        ALEXX Morris CNP

## 2019-10-28 NOTE — PROGRESS NOTES
[place] [facility]  [place] [facility] Record Number:  [id number]  Place of Service where encounter took place:  [facility] ([facility]) [[id number]]  Chief Complaint   Patient presents with     RECHECK       HPI:    Andres Sow  is a 85 year old (11/21/1933), who is being seen today for an episodic care visit.  HPI information obtained from: facility chart records, facility staff, patient report and [facility] Epic chart review. Today's concern is:     Malignant neoplasm (H)  Metastatic cancer (H)  Anemia in neoplastic disease  Gross hematuria  Anemia due to blood loss, acute  Chronic indwelling Tamez catheter  Syncope, unspecified syncope type  Acute cystitis with hematuria   Patient recent diagnosed with pelvic mass, found to be neuroendocrine tumor with mets. . Has had gross hematuria d/t involvement of prostate. Has had multiple blood transfusion to continued bleeding. Last transfusion was 10/23 where he received 2 units PRBC. Hgb on 10/24 was 9.9, stable at 9.3 on lab draw today. Continues to have some bleeding in catheter today, but is improved from last week. He does report some discomfort from catheter. Recently completed course of keflex for UTI.     He was seen by Dr Hebert on 10/24 - he opted not to pursue chemotherapy at that time. He met with hospice on 10/25, but has not yet decided if he wants to sign on. He reports feeling very tired today. Reports some neck pain and catheter discomfort. Has poor appetite, had BM yesterday, reports typically has a BM every 3-4 days.     Past Medical and Surgical History reviewed in Epic today.    MEDICATIONS:  Current Outpatient Medications   Medication Sig Dispense Refill     acetaminophen (TYLENOL) 500 MG tablet Take 1,000 mg by mouth 3 times daily       allopurinol (ZYLOPRIM) 100 MG tablet Take 1 tablet (100 mg) by mouth daily 90 tablet 3     lidocaine (XYLOCAINE) 2 % external gel Apply topically 4 times daily as needed for moderate pain  "Apply to Tip of penis topically as needed for pain Up to four times daily        melatonin 3 MG tablet Take 3 mg by mouth nightly as needed for sleep       methylphenidate (RITALIN) 10 MG tablet Take 1 tablet (10 mg) by mouth 2 times daily 14 tablet 0     Multiple Vitamins-Minerals (PRESERVISION AREDS PO) Take 1 tablet by mouth 2 times daily       Neomycin-Bacitracin-Polymyxin (TRIPLE ANTIBIOTIC) 3.5-400-5000 OINT ointment Apply topically 4 times daily Through 10/22/2019, then 4 times daily as needed       Nutritional Supplements (ENSURE PLUS) LIQD Take 6 oz by mouth 3 times daily Between meals       order for DME Equipment being ordered: Wheelchair    Dx:  Severe aortic stenosis, neoplastic fatigue 1 each 0     oxyCODONE (ROXICODONE) 5 MG tablet Take 1 tablet (5 mg) by mouth every 4 hours as needed for moderate to severe pain 20 tablet 0     polyethylene glycol (MIRALAX/GLYCOLAX) packet Take 1 packet by mouth daily as needed for constipation       senna-docusate (SENOKOT-S/PERICOLACE) 8.6-50 MG tablet Take 1 tablet by mouth At Bedtime       sulfamethoxazole-trimethoprim (BACTRIM DS/SEPTRA DS) 800-160 MG tablet Take 1 tablet by mouth 2 times daily for 7 days 14 tablet 0     tamsulosin (FLOMAX) 0.4 MG capsule Take 1 capsule (0.4 mg) by mouth daily 90 capsule 3     vitamin D3 (CHOLECALCIFEROL) 2000 units (50 mcg) tablet Take 1 tablet by mouth every evening       cephALEXin (KEFLEX) 500 MG capsule Take 1 capsule (500 mg) by mouth 3 times daily Through 10/24/2019           REVIEW OF SYSTEMS:  4 point ROS including Respiratory, CV, GI and , other than that noted in the HPI,  is negative    Objective:  /56   Pulse 55   Temp 98.6  F (37  C)   Resp 16   Ht 1.753 m (5' 9\")   Wt 64.4 kg (142 lb)   SpO2 96%   BMI 20.97 kg/m    Exam:  GENERAL APPEARANCE:  Alert, in no distress, oriented, thin, cooperative, pale, frail  RESP:  respiratory effort and palpation of chest normal, lungs clear to auscultation , no " respiratory distress  CV:  Palpation and auscultation of heart done , no edema, rate-normal, grade 3/6 murmur  ABDOMEN:  no guarding or rebound, bowel sounds normal  :    light catheter in place, red tinged urine with some red sediment   M/S:   generalized weakness, no gross joint tenderness  SKIN:  Inspection of skin and subcutaneous tissue baseline  NEURO:   Cranial nerves 2-12 are normal tested and grossly at patient's baseline  PSYCH:  oriented X 3, normal insight, judgement and memory, affect and mood normal    Labs:   Recent labs in [facility] reviewed by me today.  and   Most Recent 3 CBC's:  Recent Labs   Lab Test 10/28/19  0805 10/24/19  0818 10/21/19  0621  10/18/19  1504   WBC 9.0  --  8.8  --  8.8   HGB 9.3* 9.9* 7.9*   < > 7.5*   MCV 94  --  92  --  93     --  225  --  231    < > = values in this interval not displayed.       ASSESSMENT/PLAN:  (C80.1) Malignant neoplasm (H)  (primary encounter diagnosis)  (C79.9) Metastatic cancer (H)  Comment: Neuroendocrine tumor. Has declined palliative chemo. Met with hospice on 10/25, undecided if he will sign on. LCD for therapy is this week.   Plan: Patient to make decision on hospice on 10/29, will f/u for discharge planning at that time.     (R31.0) Gross hematuria  (D63.0) Anemia in neoplastic disease  (D62) Anemia due to blood loss, acute  (Z96.0) Chronic indwelling Light catheter  Comment: Hgb stable, bleeding appears reduced. Patient uncertain if he wants further transfusions  Plan: Will defer further lab draws at this time until patient decides on hospice. Monitor VS    (R55) Syncope, unspecified syncope type  Comment: In the setting of anemia and dehydration  Plan: Diet as tolerated, monitor VS, defer lab work until hospice decision made.     (N30.01) Acute cystitis with hematuria  Comment: appears resolved, asymptomatic, bleeding resolved.   Plan: Monitor, continue current catheter cares. Change out at urology office.     transcribed by team  coordinator: Sarah Rueda  Orders:  1. NNO      Electronically signed by:  ALEXX Morris CNP

## 2019-10-29 NOTE — PROGRESS NOTES
DATE OF VISIT: Oct 23, 2019    Andres Sow is a 85 year old male is seen today for No chief complaint on file.  .       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  I discussed with him the home of palliative chemotherapy.  I would recommend chemotherapy with carboplatin and etoposide. I gave him an overview about potential benefits as well as side effects.  Patient is not interested to proceed with chemotherapy.  I will give him more information about chemotherapy will call us if he decided to proceed.  We also discussed about palliative approach as well as symptom management including blood transfusions.  I talked to him about hospice program in details.  We will facilitate consultation with hospice team if the patient decides to proceed with hospice.  I have not scheduled the patient for any further appointments I will see him in the future if there are new the vomits or concerns.    The patient is ready to learn, no apparent learning barriers were identified.  Diagnosis and treatment plans were explained to the patient. The patient expressed understanding of the content. The patient asked appropriate questions. The patient questions were answered to his satisfaction.  Time spent 25 minutes more than 50% of the time in counseling coronation of care including discussion of management of metastatic neuroendocrine tumor, potential side effects of chemotherapy, role of palliative chemotherapy, symptom management and hospice, follow-up and prognosis.  Chart documentation with Dragon Voice recognition Software. Although reviewed after completion, some words and grammatical errors may remain.

## 2019-11-08 NOTE — UTILIZATION REVIEW
Admission Status; Secondary Review Determination       As part of the [facility] Utilization review plan, a self-audit is done on Medicare inpatient admission with less than 2 midnights stay. The 2014 IPPS Final Rule allows outpatient billing in the event that a hospital determines that an inpatient admission was not medically necessary under utilization review process.          (x) Observation status would be Appropriate- Short Stay- Post discharge review.     RATIONALE FOR DETERMINATION   The patient presented with syncope. Per the H&P: Disposition Plan   Expected discharge: Tomorrow, recommended to back to TCU once syncope evaluation complete. The patient was stqable over night and discharged back to his TCU on l10/16.  Obs order Collin Montana MD 10/15/19 1403 Syncope.  IP order  10/15/19 1519 < 2 midnights Loss of consciousness    This account and medical record number for a Medicare beneficiary was an inpatient short stay (<2 midnights) and didn't meet medical necessity for inpatient admission. We recommend billing outpatient services based on the severity of illness, intensity of service provided and the inpatient length of stay.  Please contact me within one week of receiving this letter only if you disagree with this determination. If you concur, no further action is needed.          The information on this document is developed by the utilization review team in order for the business office to ensure compliance.  This only denotes the appropriateness of proper admission status and does not reflect the quality of care rendered.         The definitions of Inpatient Status and Observation Status used in making the determination above are those provided in the CMS Coverage Manual, Chapter 1 and Chapter 6, section 70.4.      Sincerely,       FAB PINEDO MD    System Medical Director  Utilization  Management  [facility].

## 2019-11-18 ENCOUNTER — PATIENT OUTREACH (OUTPATIENT)
Dept: CARE COORDINATION | Facility: CLINIC | Age: 84
End: 2019-11-18

## 2019-11-18 NOTE — PROGRESS NOTES
Per chart review; patient is .     Closed to clinic care coordination.    Namita Parr RN, [facility] - [facility] RN Coordinator  [facility]   2019    3:14 PM  [phone]

## 2020-03-21 PROBLEM — C7B.8: Status: ACTIVE | Noted: 2019-01-01

## 2020-03-21 PROBLEM — C7A.8: Status: ACTIVE | Noted: 2019-01-01

## 2020-06-05 DIAGNOSIS — I10 ESSENTIAL HYPERTENSION, BENIGN: ICD-10-CM

## 2020-06-05 DIAGNOSIS — M10.9 GOUT, UNSPECIFIED CAUSE, UNSPECIFIED CHRONICITY, UNSPECIFIED SITE: ICD-10-CM

## 2020-06-05 RX ORDER — ALLOPURINOL 100 MG/1
TABLET ORAL
Qty: 90 TABLET | Refills: 3 | OUTPATIENT
Start: 2020-06-05

## 2020-06-05 RX ORDER — AMLODIPINE BESYLATE 10 MG/1
TABLET ORAL
Qty: 90 TABLET | Refills: 3 | OUTPATIENT
Start: 2020-06-05

## 2020-06-05 RX ORDER — LOSARTAN POTASSIUM 100 MG/1
TABLET ORAL
Qty: 90 TABLET | Refills: 3 | OUTPATIENT
Start: 2020-06-05

## 2023-05-31 NOTE — ED PROVIDER NOTES
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  History is obtained from healthcare providers at the scene, I spoke with both Dr. Pierre in whose clinic he was as well as Dr. Vega who happened to be present at the time and has been involved with this patient's care in the past.  The patient's oncologist is Dr. Aponte who is not available today.  Also obtain history from the patient's daughter who states that they had some phone discussion with Dr. Aponte this past week about initiation of chemotherapy and port placement but they are not sure if they want to proceed with this at this point and would like further discussion with Dr. Aponte.  The patient reports that he is currently at the [facility], he has not been eating or drinking as well as perhaps he should have.  While seated and awaiting the port placement he became lightheaded and actually passed out for a couple of minutes.  No loss of spontaneous respiration or pulse.  He noted no chest pain or shortness of breath just felt lightheaded and passed out.  He feels weak and tired right now but otherwise his baseline.  There has been no nausea vomiting or emesis specifically no hematemesis or coffee-ground emesis.  There has been no black or bloody bowel movement.  He continues with  ongoing gross hematuria with urethral catheter in place.    Allergies:  Allergies   Allergen Reactions     Hydrochlorothiazide Other (See Comments)     Low Potassium     Lisinopril Swelling     Neck swelling/airway     Metoprolol Other (See Comments)     He was seen in the emergency room for lightheadedness after I increased his metoprolol. Patient stopped taking it-noted 6/18/2019.        Problem List:    Patient Active Problem List    Diagnosis Date Noted     Protein-calorie malnutrition (H) 10/14/2019     Priority: Medium     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) 10/10/2019     Priority: Medium     Gross hematuria 10/07/2019     Priority: Medium     Anemia due to blood loss, acute 10/07/2019     Priority: Medium     Malignant neoplasm (H) 10/07/2019     Priority: Medium     Anemia in neoplastic disease 10/07/2019     Priority: Medium     Syncope 09/30/2019     Priority: Medium     Anemia due to hematuria 09/30/2019     Priority: Medium     Urinary tract infection 09/30/2019     Priority: Medium     Suspected malignant neoplasm 09/30/2019     Priority: Medium     History of gout 09/30/2019     Priority: Medium     Urinary retention 09/30/2019     Priority: Medium     Aortic valve stenosis, severe 05/09/2019     Priority: Medium     Per echocardiogram 10/1/19:  Severe aortic stenosis, mean gradient 43 mm Hg, aortic valve area 0.7 cm2.       Gout, unspecified cause, unspecified chronicity, unspecified site 05/09/2019     Priority: Medium     Essential hypertension, benign 05/09/2019     Priority: Medium     Advanced directives, counseling/discussion 05/16/2018     Priority: Medium     Patient does not have an Advance/Health Care Directive (HCD), requests blank HCD form.    Lucita Farley  May 16, 2018         Hypertension goal BP (blood pressure) < 150/90 03/29/2017     Priority: Medium     Anxiety 06/17/2014     Priority: Medium     Colon polyps 08/01/2012     Priority: Medium     Benign tubular  adenomatous polyps - repeat colon in 5 years       Bradycardia 2012     Priority: Medium     CKD (chronic kidney disease) stage 3, GFR 30-59 ml/min (H) 2012     Priority: Medium     Hyperlipidemia LDL goal <100 10/31/2010     Priority: Medium        Past Medical History:    Past Medical History:   Diagnosis Date     Hypertension      Unspecified vertiginous syndromes and labyrinthine disorders        Past Surgical History:    Past Surgical History:   Procedure Laterality Date     COLONOSCOPY  2007    Repeat colonoscopy in 3 years for surveillance     HC REMOVAL OF TONSILS,<13 Y/O         Family History:    Family History   Problem Relation Age of Onset     Hypertension Father      Breast Cancer Sister      Cerebrovascular Disease Sister        Social History:  Marital Status:   [5]  Social History     Tobacco Use     Smoking status: Former Smoker     Years: 25.00     Types: Cigarettes, Pipe, Cigars     Last attempt to quit: 1978     Years since quittin.8     Smokeless tobacco: Never Used   Substance Use Topics     Alcohol use: Yes     Alcohol/week: 60.0 standard drinks     Comment: drinks 2 drinks daily     Drug use: No        Medications:    acetaminophen (TYLENOL) 500 MG tablet  allopurinol (ZYLOPRIM) 100 MG tablet  losartan (COZAAR) 100 MG tablet  methylphenidate (RITALIN) 10 MG tablet  oxyCODONE (ROXICODONE) 5 MG tablet  tamsulosin (FLOMAX) 0.4 MG capsule  vitamin D3 (CHOLECALCIFEROL) 2000 units (50 mcg) tablet  amLODIPine (NORVASC) 10 MG tablet  ampicillin (PRINCIPEN) 500 MG capsule  finasteride (PROSCAR) 5 MG tablet  lidocaine (XYLOCAINE) 2 % external gel  LORazepam (ATIVAN) 0.5 MG tablet  melatonin 3 MG tablet  Multiple Vitamins-Minerals (PRESERVISION AREDS 2) CAPS  order for DME  polyethylene glycol (MIRALAX/GLYCOLAX) packet  senna-docusate (SENOKOT-S/PERICOLACE) 8.6-50 MG tablet          Review of Systems   All other systems reviewed and are negative.      Physical Exam  "  BP: 130/51  Pulse: 50  Heart Rate: (!) 49  Temp: 97.7  F (36.5  C)  Resp: 18  Height: 175.3 cm (5' 9\")  Weight: 64.9 kg (143 lb)  SpO2: 97 %      Physical Exam  Vitals signs and nursing note reviewed.   Constitutional:       Appearance: He is normal weight. He is ill-appearing and diaphoretic.      Comments: Pale, cachectic appearing   HENT:      Head: Normocephalic and atraumatic.      Right Ear: Tympanic membrane normal.      Left Ear: Tympanic membrane normal.      Nose: Nose normal.      Mouth/Throat:      Mouth: Mucous membranes are moist.      Pharynx: Oropharynx is clear.   Eyes:      Pupils: Pupils are equal, round, and reactive to light.   Neck:      Musculoskeletal: Normal range of motion and neck supple.   Cardiovascular:      Rate and Rhythm: Normal rate and regular rhythm.      Pulses: Normal pulses.      Heart sounds: Normal heart sounds.   Pulmonary:      Effort: Pulmonary effort is normal.      Breath sounds: Normal breath sounds.   Abdominal:      Palpations: Abdomen is soft.   Musculoskeletal: Normal range of motion.   Skin:     General: Skin is warm.      Capillary Refill: Capillary refill takes less than 2 seconds.   Neurological:      General: No focal deficit present.      Mental Status: He is alert.   Psychiatric:         Mood and Affect: Mood normal.         Behavior: Behavior normal.         ED Course        Procedures       EKG Interpretation:      Interpreted by Collin Montana MD  Time reviewed: Time obtained 1304 time interpreted 1308  Sinus bradycardia 52 bpm.  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  EKG is largely unchanged with the exception of the rate from comparison cardiogram dated 9/30/2019.  No definite acute ischemia or infarct is noted.               Critical Care time:  none               Results for orders placed or performed during the hospital encounter of 10/15/19 (from the past 24 hour(s))   CBC with platelets differential   Result Value Ref " Range    WBC 8.6 4.0 - 11.0 10e9/L    RBC Count 3.14 (L) 4.4 - 5.9 10e12/L    Hemoglobin 9.3 (L) 13.3 - 17.7 g/dL    Hematocrit 29.0 (L) 40.0 - 53.0 %    MCV 92 78 - 100 fl    MCH 29.6 26.5 - 33.0 pg    MCHC 32.1 31.5 - 36.5 g/dL    RDW 15.0 10.0 - 15.0 %    Platelet Count 245 150 - 450 10e9/L    Diff Method Automated Method     % Neutrophils 69.1 %    % Lymphocytes 9.2 %    % Monocytes 8.6 %    % Eosinophils 11.9 %    % Basophils 0.8 %    % Immature Granulocytes 0.4 %    Nucleated RBCs 0 0 /100    Absolute Neutrophil 5.9 1.6 - 8.3 10e9/L    Absolute Lymphocytes 0.8 0.8 - 5.3 10e9/L    Absolute Monocytes 0.7 0.0 - 1.3 10e9/L    Absolute Eosinophils 1.0 (H) 0.0 - 0.7 10e9/L    Absolute Basophils 0.1 0.0 - 0.2 10e9/L    Abs Immature Granulocytes 0.0 0 - 0.4 10e9/L    Absolute Nucleated RBC 0.0    INR   Result Value Ref Range    INR 0.95 0.86 - 1.14   Comprehensive metabolic panel   Result Value Ref Range    Sodium 134 133 - 144 mmol/L    Potassium 4.5 3.4 - 5.3 mmol/L    Chloride 101 94 - 109 mmol/L    Carbon Dioxide 26 20 - 32 mmol/L    Anion Gap 7 3 - 14 mmol/L    Glucose 107 (H) 70 - 99 mg/dL    Urea Nitrogen 60 (H) 7 - 30 mg/dL    Creatinine 1.74 (H) 0.66 - 1.25 mg/dL    GFR Estimate 35 (L) >60 mL/min/[1.73_m2]    GFR Estimate If Black 40 (L) >60 mL/min/[1.73_m2]    Calcium 9.7 8.5 - 10.1 mg/dL    Bilirubin Total 0.4 0.2 - 1.3 mg/dL    Albumin 2.8 (L) 3.4 - 5.0 g/dL    Protein Total 6.9 6.8 - 8.8 g/dL    Alkaline Phosphatase 72 40 - 150 U/L    ALT 21 0 - 70 U/L    AST 28 0 - 45 U/L   Lactic acid whole blood   Result Value Ref Range    Lactic Acid 1.7 0.7 - 2.0 mmol/L   ABO/Rh type and screen   Result Value Ref Range    Units Ordered 2     ABO A     RH(D) Pos     Antibody Screen Neg     Test Valid Only At [facility], [place]        Specimen Expires 10/18/2019     Crossmatch Red Blood Cells    Blood component   Result Value Ref Range    Unit Number [id number]     Blood Component Type Red Blood Cells Leukocyte  Reduced     Division Number 00     Status of Unit Ready for patient 10/15/2019 1120     Blood Product Code [id number]     Unit Status TAYLOR    Blood component   Result Value Ref Range    Unit Number [id number]     Blood Component Type Red Blood Cells Leukocyte Reduced     Division Number 00     Status of Unit Ready for patient 10/15/2019 1120     Blood Product Code [id number]     Unit Status TAYOLR    Troponin I   Result Value Ref Range    Troponin I ES <0.015 0.000 - 0.045 ug/L     12:12 PM  I spoke with Dr. Robert Vega as well as with Dr. Bertram Pierre regarding this patient.  Unfortunately Dr. Aponte is not available today.  Discussed extensively with family the patient's presentation today and I reviewed lab diagnostics since the time of his most recent hospital admission.  He has ongoing externalized blood loss with gross hematuria with his malignancy and Tamez catheter in place.  His hemoglobin today is 9.3 which appears to be slightly better than 3 days ago.  I suspect that this is fictitious; I think the patient is not eating and drinking well and is likely hypovolemic secondary to dehydration.  His BUN and creatinine would reflect this as well.  He was given hydration in the emergency department, felt improved.  Had no further episodes of syncope.  At no point has he had chest pain or tightness.  I discussed potential options with him including admission to observation status for discussion of and coordination of ongoing care with Dr. Aponte in his care team tomorrow.  Both the patient and multiple family members are concerned and question whether he should be getting chemotherapy or not at this point.  They would like to discuss further with oncology before proceeding with a port or any further cancer directed care.  Also discussed potential for disposition home after fluids in the emergency department provided that he is feeling improved.  Both patient and family would like to have him admitted to  observation.  I think this is reasonable.  I would plan to continue his hydration and recheck hemoglobin before proceeding with possibly transfusing him.  I discussed this patient with the on-call hospitalist Dr. Terri Rodriguez who agreed to accept the care of the patient upon admission.  Transition orders are placed by myself.    Medications   lactated ringers BOLUS 1,000 mL (1,000 mLs Intravenous New Bag 10/15/19 1039)     Followed by   lactated ringers infusion (has no administration in time range)       Assessments & Plan (with Medical Decision Making)   Unfortunate 85-year-old male with stage IV neuroendocrine carcinoma who presents after a syncopal episode in the surgery clinic awaiting placement of Port-A-Cath as described in the HPI.  Physical exam finds a patient alert, pale, cachectic but in no acute distress and without acute examination findings.  Mildly bradycardic in the 50s, not hypotensive not febrile, will maintain oxygen saturation without tachypnea.  Patient has a significant history of recent anemia and status post transfusion after hospital admission as discussed in the HPI.  His hemoglobin today is 9.3 which is marginally better than 3 days ago I think this is likely reflective of a component of dehydration.  I suspect that this will be lower once he is properly hydrated.  The patient has noticed symptoms directly attributable to the anemia after his episode of syncope earlier.  I suspect that based upon his history with poor oral intake and the correlating BUN and creatinine that the patient is most likely dehydrated rather than acutely symptomatic from his anemia.  He received IV fluid bolus in the emergency department.  Please see the discussion regarding oncology and recommendations for chemotherapy as well as ongoing discussion with family who are uncertain as to whether they wish to proceed with chemotherapy for this patient.  Disposition options were discussed.  Family elected to have  him admitted to observation status, continued hydration and I plan to recheck his hemoglobin later and hospitalist service will reassess whether they wish to transfuse him acutely or not.  Incision orders are placed by myself in the emergency department.      Disclaimer: This note consists of symbols derived from keyboarding, dictation and/or voice recognition software. As a result, there may be errors in the script that have gone undetected. Please consider this when interpreting information found in this chart.        I have reviewed the nursing notes.    I have reviewed the findings, diagnosis, plan and need for follow up with the patient.          New Prescriptions    No medications on file       Final diagnoses:   Syncope and collapse   Gross hematuria   Anemia due to blood loss, acute   Dehydration       10/15/2019   [facility] EMERGENCY DEPARTMENT     Collin Montana MD  10/15/19 3240     Estlander Flap (Upper To Lower Lip) Text: The defect of the lower lip was assessed and measured.  Given the location and size of the defect, an Estlander flap was deemed most appropriate.  Using a sterile surgical marker, an appropriate Estlander flap was measured and drawn on the upper lip. Local anesthesia was then infiltrated. A scalpel was then used to incise the lateral aspect of the flap, through skin, muscle and mucosa, leaving the flap pedicled medially.  The flap was then rotated and positioned to fill the lower lip defect.  The flap was then sutured into place with a three layer technique, closing the orbicularis oris muscle layer with subcutaneous buried sutures, followed by a mucosal layer and an epidermal layer.

## 2023-07-20 NOTE — PROGRESS NOTES
Appointment source: New Patient  Patient name: Andres Sow  Urology Staff: Robert Vega MD    Subjective: This is a 85 year old year old male complaining of urinary retention and gross hematuria.    Had several episodes of gross hematuria culminating in urinary retention that prompted insertion of a light catheter.    Review of systems: a comprehensive 10 point ROS was obtained and was otherwise negative except for that outlined above.    Problem list and histories reviewed & adjusted, as indicated.  Additional history: as documented    Tamsulosin was stopped after his most recent ER visit.    Objective:  Examination:    Healthy male  HEENT: anicteric sclera, normal extraocular movements  Respiratory: normal, non labored breathing  Musculoskeletal:Normal muscular movements  Skin normal temperature, no rash  Psychiatric: appropriate affect    Abdomen benign  No evidence of inguinal hernia  No evidence of inguinal adenopathy  Phallus without lesion.  Scrotal contents normal  Rectal examination normal  Prostate benign to palpation    Cystoscopy visualization of the bladder was sub-optimal secondary to hematuria but no gross neoplastic growth was detected.    Bladder was moderately trabeculated.    Voiding trial was not successful and bladder drainage by light catheter was reestablished.    Assessment:  Gross hematuria probably secondary to urinary retention.    Plan:  Urine culture was ordered. Asked him to restart his tamsulosin.           Cibinqo Pregnancy And Lactation Text: It is unknown if this medication will adversely affect pregnancy or breast feeding.  You should not take this medication if you are currently pregnant or planning a pregnancy or while breastfeeding.